# Patient Record
Sex: MALE | Race: BLACK OR AFRICAN AMERICAN | NOT HISPANIC OR LATINO | Employment: OTHER | URBAN - METROPOLITAN AREA
[De-identification: names, ages, dates, MRNs, and addresses within clinical notes are randomized per-mention and may not be internally consistent; named-entity substitution may affect disease eponyms.]

---

## 2017-01-01 ENCOUNTER — OFFICE VISIT (OUTPATIENT)
Dept: PAIN MEDICINE | Facility: CLINIC | Age: 62
End: 2017-01-01

## 2017-01-01 ENCOUNTER — APPOINTMENT (OUTPATIENT)
Dept: CARDIOLOGY | Facility: HOSPITAL | Age: 62
End: 2017-01-01
Attending: INTERNAL MEDICINE

## 2017-01-01 ENCOUNTER — APPOINTMENT (OUTPATIENT)
Dept: GENERAL RADIOLOGY | Facility: HOSPITAL | Age: 62
End: 2017-01-01

## 2017-01-01 ENCOUNTER — ANESTHESIA EVENT (OUTPATIENT)
Dept: GASTROENTEROLOGY | Facility: HOSPITAL | Age: 62
End: 2017-01-01

## 2017-01-01 ENCOUNTER — ANESTHESIA (OUTPATIENT)
Dept: GASTROENTEROLOGY | Facility: HOSPITAL | Age: 62
End: 2017-01-01

## 2017-01-01 ENCOUNTER — HOSPITAL ENCOUNTER (EMERGENCY)
Facility: HOSPITAL | Age: 62
Discharge: HOME | End: 2017-08-25
Attending: EMERGENCY MEDICINE | Admitting: EMERGENCY MEDICINE

## 2017-01-01 ENCOUNTER — HOSPITAL ENCOUNTER (OUTPATIENT)
Dept: NEUROLOGY | Facility: HOSPITAL | Age: 62
Discharge: HOME | End: 2017-08-14
Attending: FAMILY MEDICINE | Admitting: FAMILY MEDICINE

## 2017-01-01 ENCOUNTER — APPOINTMENT (OUTPATIENT)
Dept: NEUROLOGY | Facility: HOSPITAL | Age: 62
End: 2017-01-01
Attending: FAMILY MEDICINE

## 2017-01-01 ENCOUNTER — HOSPITAL ENCOUNTER (INPATIENT)
Facility: HOSPITAL | Age: 62
LOS: 6 days | Discharge: HOME | End: 2017-09-18
Attending: EMERGENCY MEDICINE | Admitting: INTERNAL MEDICINE

## 2017-01-01 ENCOUNTER — OUTSIDE FACILITY SERVICE (OUTPATIENT)
Dept: HOSPITALIST | Facility: HOSPITAL | Age: 62
End: 2017-01-01

## 2017-01-01 ENCOUNTER — HOSPITAL ENCOUNTER (OUTPATIENT)
Facility: HOSPITAL | Age: 62
Setting detail: OBSERVATION
Discharge: HOME | End: 2017-09-29
Attending: EMERGENCY MEDICINE | Admitting: HOSPITALIST

## 2017-01-01 ENCOUNTER — OFFICE VISIT (OUTPATIENT)
Dept: GASTROENTEROLOGY | Facility: CLINIC | Age: 62
End: 2017-01-01

## 2017-01-01 ENCOUNTER — APPOINTMENT (OUTPATIENT)
Dept: CARDIOLOGY | Facility: HOSPITAL | Age: 62
End: 2017-01-01

## 2017-01-01 VITALS
WEIGHT: 315 LBS | SYSTOLIC BLOOD PRESSURE: 113 MMHG | TEMPERATURE: 99 F | RESPIRATION RATE: 24 BRPM | OXYGEN SATURATION: 97 % | HEIGHT: 72 IN | HEART RATE: 90 BPM | DIASTOLIC BLOOD PRESSURE: 70 MMHG | BODY MASS INDEX: 42.66 KG/M2

## 2017-01-01 VITALS
OXYGEN SATURATION: 99 % | RESPIRATION RATE: 19 BRPM | HEIGHT: 72 IN | HEART RATE: 124 BPM | SYSTOLIC BLOOD PRESSURE: 140 MMHG | BODY MASS INDEX: 42.66 KG/M2 | TEMPERATURE: 96.5 F | DIASTOLIC BLOOD PRESSURE: 74 MMHG | WEIGHT: 315 LBS

## 2017-01-01 VITALS
HEIGHT: 72 IN | HEART RATE: 87 BPM | BODY MASS INDEX: 42.66 KG/M2 | RESPIRATION RATE: 20 BRPM | WEIGHT: 315 LBS | DIASTOLIC BLOOD PRESSURE: 77 MMHG | SYSTOLIC BLOOD PRESSURE: 125 MMHG | OXYGEN SATURATION: 95 % | TEMPERATURE: 98.1 F

## 2017-01-01 VITALS
OXYGEN SATURATION: 96 % | TEMPERATURE: 97.2 F | HEIGHT: 72 IN | SYSTOLIC BLOOD PRESSURE: 112 MMHG | WEIGHT: 315 LBS | DIASTOLIC BLOOD PRESSURE: 68 MMHG | BODY MASS INDEX: 42.66 KG/M2 | HEART RATE: 93 BPM

## 2017-01-01 VITALS
RESPIRATION RATE: 20 BRPM | OXYGEN SATURATION: 97 % | SYSTOLIC BLOOD PRESSURE: 107 MMHG | TEMPERATURE: 97.7 F | HEIGHT: 72 IN | WEIGHT: 315 LBS | DIASTOLIC BLOOD PRESSURE: 74 MMHG | BODY MASS INDEX: 42.66 KG/M2 | HEART RATE: 98 BPM

## 2017-01-01 DIAGNOSIS — I95.89 OTHER SPECIFIED HYPOTENSION: ICD-10-CM

## 2017-01-01 DIAGNOSIS — I95.9 HYPOTENSION, UNSPECIFIED HYPOTENSION TYPE: ICD-10-CM

## 2017-01-01 DIAGNOSIS — K92.0 HEMATEMESIS WITHOUT NAUSEA: ICD-10-CM

## 2017-01-01 DIAGNOSIS — E66.01 MORBID OBESITY DUE TO EXCESS CALORIES (HCC): ICD-10-CM

## 2017-01-01 DIAGNOSIS — R53.81 PHYSICAL DECONDITIONING: ICD-10-CM

## 2017-01-01 DIAGNOSIS — A04.72 C. DIFFICILE COLITIS: Primary | ICD-10-CM

## 2017-01-01 DIAGNOSIS — K31.811 GASTROINTESTINAL HEMORRHAGE ASSOCIATED WITH ANGIODYSPLASIA OF STOMACH AND DUODENUM: Primary | ICD-10-CM

## 2017-01-01 DIAGNOSIS — G47.33 OSA (OBSTRUCTIVE SLEEP APNEA): Chronic | ICD-10-CM

## 2017-01-01 DIAGNOSIS — D62 ACUTE BLOOD LOSS ANEMIA: ICD-10-CM

## 2017-01-01 DIAGNOSIS — Z74.09 IMPAIRED FUNCTIONAL MOBILITY, BALANCE, GAIT, AND ENDURANCE: ICD-10-CM

## 2017-01-01 DIAGNOSIS — J44.9 CHRONIC OBSTRUCTIVE PULMONARY DISEASE, UNSPECIFIED COPD TYPE (HCC): Chronic | ICD-10-CM

## 2017-01-01 DIAGNOSIS — M47.816 LUMBAR FACET ARTHROPATHY: ICD-10-CM

## 2017-01-01 DIAGNOSIS — R55 SYNCOPE, UNSPECIFIED SYNCOPE TYPE: ICD-10-CM

## 2017-01-01 DIAGNOSIS — D72.829 LEUKOCYTOSIS, UNSPECIFIED TYPE: ICD-10-CM

## 2017-01-01 DIAGNOSIS — G56.02 CARPAL TUNNEL SYNDROME, LEFT: ICD-10-CM

## 2017-01-01 DIAGNOSIS — I48.20 CHRONIC ATRIAL FIBRILLATION (HCC): Chronic | ICD-10-CM

## 2017-01-01 DIAGNOSIS — I95.9 SYMPTOMATIC HYPOTENSION: Primary | ICD-10-CM

## 2017-01-01 DIAGNOSIS — N18.9 CKD (CHRONIC KIDNEY DISEASE), UNSPECIFIED STAGE: ICD-10-CM

## 2017-01-01 DIAGNOSIS — N18.6 ESRD (END STAGE RENAL DISEASE) (HCC): Chronic | ICD-10-CM

## 2017-01-01 DIAGNOSIS — K92.2 ACUTE UPPER GI BLEED: Primary | ICD-10-CM

## 2017-01-01 DIAGNOSIS — M17.0 PRIMARY OSTEOARTHRITIS OF BOTH KNEES: ICD-10-CM

## 2017-01-01 DIAGNOSIS — K92.0 HEMATEMESIS WITH NAUSEA: ICD-10-CM

## 2017-01-01 DIAGNOSIS — M48.062 LUMBAR STENOSIS WITH NEUROGENIC CLAUDICATION: ICD-10-CM

## 2017-01-01 DIAGNOSIS — N18.6 ESRD (END STAGE RENAL DISEASE) (HCC): ICD-10-CM

## 2017-01-01 LAB
ABO + RH BLD: NORMAL
ABO GROUP BLD: NORMAL
ALBUMIN SERPL-MCNC: 2.6 G/DL (ref 3.2–4.8)
ALBUMIN SERPL-MCNC: 2.7 G/DL (ref 3.2–4.8)
ALBUMIN SERPL-MCNC: 2.7 G/DL (ref 3.2–4.8)
ALBUMIN SERPL-MCNC: 2.8 G/DL (ref 3.2–4.8)
ALBUMIN SERPL-MCNC: 2.9 G/DL (ref 3.2–4.8)
ALBUMIN SERPL-MCNC: 2.9 G/DL (ref 3.2–4.8)
ALBUMIN SERPL-MCNC: 3.2 G/DL (ref 3.2–4.8)
ALBUMIN SERPL-MCNC: 3.5 G/DL (ref 3.2–4.8)
ALBUMIN SERPL-MCNC: 3.9 G/DL (ref 3.2–4.8)
ALBUMIN/GLOB SERPL: 0.8 G/DL (ref 1.5–2.5)
ALBUMIN/GLOB SERPL: 0.9 G/DL (ref 1.5–2.5)
ALBUMIN/GLOB SERPL: 1 G/DL (ref 1.5–2.5)
ALBUMIN/GLOB SERPL: 1 G/DL (ref 1.5–2.5)
ALBUMIN/GLOB SERPL: 1.1 G/DL (ref 1.5–2.5)
ALP SERPL-CCNC: 101 U/L (ref 25–100)
ALP SERPL-CCNC: 117 U/L (ref 25–100)
ALP SERPL-CCNC: 67 U/L (ref 25–100)
ALP SERPL-CCNC: 83 U/L (ref 25–100)
ALP SERPL-CCNC: 91 U/L (ref 25–100)
ALT SERPL W P-5'-P-CCNC: 10 U/L (ref 7–40)
ALT SERPL W P-5'-P-CCNC: 10 U/L (ref 7–40)
ALT SERPL W P-5'-P-CCNC: 12 U/L (ref 7–40)
ALT SERPL W P-5'-P-CCNC: 17 U/L (ref 7–40)
ALT SERPL W P-5'-P-CCNC: 18 U/L (ref 7–40)
ANION GAP SERPL CALCULATED.3IONS-SCNC: 11 MMOL/L (ref 3–11)
ANION GAP SERPL CALCULATED.3IONS-SCNC: 12 MMOL/L (ref 3–11)
ANION GAP SERPL CALCULATED.3IONS-SCNC: 12 MMOL/L (ref 3–11)
ANION GAP SERPL CALCULATED.3IONS-SCNC: 13 MMOL/L (ref 3–11)
ANION GAP SERPL CALCULATED.3IONS-SCNC: 14 MMOL/L (ref 3–11)
ANION GAP SERPL CALCULATED.3IONS-SCNC: 15 MMOL/L (ref 3–11)
ANION GAP SERPL CALCULATED.3IONS-SCNC: 15 MMOL/L (ref 3–11)
ANION GAP SERPL CALCULATED.3IONS-SCNC: 16 MMOL/L (ref 3–11)
ANION GAP SERPL CALCULATED.3IONS-SCNC: 18 MMOL/L (ref 3–11)
APTT PPP: 28.2 SECONDS (ref 24–31)
AST SERPL-CCNC: 13 U/L (ref 0–33)
AST SERPL-CCNC: 13 U/L (ref 0–33)
AST SERPL-CCNC: 16 U/L (ref 0–33)
AST SERPL-CCNC: 17 U/L (ref 0–33)
AST SERPL-CCNC: 18 U/L (ref 0–33)
BACTERIA SPEC AEROBE CULT: NORMAL
BACTERIA SPEC RESP CULT: ABNORMAL
BACTERIA SPEC RESP CULT: ABNORMAL
BASOPHILS # BLD AUTO: 0.03 10*3/MM3 (ref 0–0.2)
BASOPHILS # BLD AUTO: 0.04 10*3/MM3 (ref 0–0.2)
BASOPHILS # BLD AUTO: 0.04 10*3/MM3 (ref 0–0.2)
BASOPHILS # BLD AUTO: 0.06 10*3/MM3 (ref 0–0.2)
BASOPHILS # BLD MANUAL: 0.1 10*3/MM3 (ref 0–0.2)
BASOPHILS NFR BLD AUTO: 0.2 % (ref 0–1)
BASOPHILS NFR BLD AUTO: 0.3 % (ref 0–1)
BASOPHILS NFR BLD AUTO: 0.4 % (ref 0–1)
BASOPHILS NFR BLD AUTO: 0.7 % (ref 0–1)
BASOPHILS NFR BLD AUTO: 1 % (ref 0–1)
BH BB BLOOD EXPIRATION DATE: NORMAL
BH BB BLOOD TYPE BARCODE: 6200
BH BB DISPENSE STATUS: NORMAL
BH BB PRODUCT CODE: NORMAL
BH BB UNIT NUMBER: NORMAL
BH CV ECHO MEAS - AO MAX PG (FULL): 3.5 MMHG
BH CV ECHO MEAS - AO MAX PG: 7.9 MMHG
BH CV ECHO MEAS - AO ROOT AREA (BSA CORRECTED): 1.3
BH CV ECHO MEAS - AO ROOT AREA: 9.9 CM^2
BH CV ECHO MEAS - AO ROOT DIAM: 3.5 CM
BH CV ECHO MEAS - AO V2 MAX: 140.6 CM/SEC
BH CV ECHO MEAS - AVA(V,A): 2.7 CM^2
BH CV ECHO MEAS - AVA(V,D): 2.7 CM^2
BH CV ECHO MEAS - BSA(HAYCOCK): 2.9 M^2
BH CV ECHO MEAS - BSA: 2.7 M^2
BH CV ECHO MEAS - BZI_BMI: 45.4 KILOGRAMS/M^2
BH CV ECHO MEAS - BZI_METRIC_HEIGHT: 182.9 CM
BH CV ECHO MEAS - BZI_METRIC_WEIGHT: 152 KG
BH CV ECHO MEAS - CONTRAST EF (2CH): 38.5 ML/M^2
BH CV ECHO MEAS - CONTRAST EF 4CH: 50.5 ML/M^2
BH CV ECHO MEAS - EDV(CUBED): 128.7 ML
BH CV ECHO MEAS - EDV(MOD-SP2): 91 ML
BH CV ECHO MEAS - EDV(MOD-SP4): 109 ML
BH CV ECHO MEAS - EDV(TEICH): 121 ML
BH CV ECHO MEAS - EF(CUBED): 50.5 %
BH CV ECHO MEAS - EF(MOD-SP2): 38.5 %
BH CV ECHO MEAS - EF(MOD-SP4): 50.5 %
BH CV ECHO MEAS - EF(TEICH): 42.4 %
BH CV ECHO MEAS - ESV(CUBED): 63.7 ML
BH CV ECHO MEAS - ESV(MOD-SP2): 56 ML
BH CV ECHO MEAS - ESV(MOD-SP4): 54 ML
BH CV ECHO MEAS - ESV(TEICH): 69.7 ML
BH CV ECHO MEAS - FS: 20.9 %
BH CV ECHO MEAS - IVS/LVPW: 0.89
BH CV ECHO MEAS - IVSD: 1.2 CM
BH CV ECHO MEAS - LA DIMENSION: 5 CM
BH CV ECHO MEAS - LA/AO: 1.4
BH CV ECHO MEAS - LAT PEAK E' VEL: 14.5 CM/SEC
BH CV ECHO MEAS - LV DIASTOLIC VOL/BSA (35-75): 41.1 ML/M^2
BH CV ECHO MEAS - LV MASS(C)D: 253.4 GRAMS
BH CV ECHO MEAS - LV MASS(C)DI: 95.5 GRAMS/M^2
BH CV ECHO MEAS - LV MAX PG: 4.4 MMHG
BH CV ECHO MEAS - LV MEAN PG: 1.7 MMHG
BH CV ECHO MEAS - LV SYSTOLIC VOL/BSA (12-30): 20.4 ML/M^2
BH CV ECHO MEAS - LV V1 MAX: 104.6 CM/SEC
BH CV ECHO MEAS - LV V1 MEAN: 57 CM/SEC
BH CV ECHO MEAS - LV V1 VTI: 16.5 CM
BH CV ECHO MEAS - LVIDD: 5 CM
BH CV ECHO MEAS - LVIDS: 4 CM
BH CV ECHO MEAS - LVLD AP2: 7.8 CM
BH CV ECHO MEAS - LVLD AP4: 7.9 CM
BH CV ECHO MEAS - LVLS AP2: 6.7 CM
BH CV ECHO MEAS - LVLS AP4: 7.9 CM
BH CV ECHO MEAS - LVOT AREA (M): 3.5 CM^2
BH CV ECHO MEAS - LVOT AREA: 3.6 CM^2
BH CV ECHO MEAS - LVOT DIAM: 2.1 CM
BH CV ECHO MEAS - LVPWD: 1.3 CM
BH CV ECHO MEAS - MED PEAK E' VEL: 11.2 CM/SEC
BH CV ECHO MEAS - MR ALIAS VEL: 31.9 CM/SEC
BH CV ECHO MEAS - MR ERO: 0.28 CM^2
BH CV ECHO MEAS - MR FLOW RATE: 128.4 CM^3/SEC
BH CV ECHO MEAS - MR MAX PG: 83.4 MMHG
BH CV ECHO MEAS - MR MAX VEL: 456.6 CM/SEC
BH CV ECHO MEAS - MR MEAN PG: 53.9 MMHG
BH CV ECHO MEAS - MR MEAN VEL: 338.6 CM/SEC
BH CV ECHO MEAS - MR PISA RADIUS: 0.8 CM
BH CV ECHO MEAS - MR PISA: 4 CM^2
BH CV ECHO MEAS - MR VOLUME: 38.2 ML
BH CV ECHO MEAS - MR VTI: 135.9 CM
BH CV ECHO MEAS - MV AREA (1 DIAM): 10.1 CM^2
BH CV ECHO MEAS - MV DIAM: 3.6 CM
BH CV ECHO MEAS - MV E MAX VEL: 103.2 CM/SEC
BH CV ECHO MEAS - MV FLOW AREA(1DIAM): 10.1 CM^2
BH CV ECHO MEAS - MV MAX PG: 8.3 MMHG
BH CV ECHO MEAS - MV MEAN PG: 3.6 MMHG
BH CV ECHO MEAS - MV V2 MAX: 144.5 CM/SEC
BH CV ECHO MEAS - MV V2 MEAN: 90 CM/SEC
BH CV ECHO MEAS - MV V2 VTI: 27.1 CM
BH CV ECHO MEAS - MVA(VTI): 2.2 CM^2
BH CV ECHO MEAS - PA ACC SLOPE: 884.3 CM/SEC^2
BH CV ECHO MEAS - PA ACC TIME: 0.09 SEC
BH CV ECHO MEAS - PA MAX PG: 5.9 MMHG
BH CV ECHO MEAS - PA PR(ACCEL): 40.2 MMHG
BH CV ECHO MEAS - PA V2 MAX: 121.5 CM/SEC
BH CV ECHO MEAS - PI END-D VEL: 173.1 CM/SEC
BH CV ECHO MEAS - RAP SYSTOLE: 3 MMHG
BH CV ECHO MEAS - RF(MV,LVOT)(1DIAM): 0.78
BH CV ECHO MEAS - RVDD: 3.1 CM
BH CV ECHO MEAS - RVSP: 23 MMHG
BH CV ECHO MEAS - SI(CUBED): 24.5 ML/M^2
BH CV ECHO MEAS - SI(LVOT): 22.3 ML/M^2
BH CV ECHO MEAS - SI(MOD-SP2): 13.2 ML/M^2
BH CV ECHO MEAS - SI(MOD-SP4): 20.7 ML/M^2
BH CV ECHO MEAS - SI(MV 1 DIAM): 103.5 ML/M^2
BH CV ECHO MEAS - SI(TEICH): 19.3 ML/M^2
BH CV ECHO MEAS - SV(CUBED): 65.1 ML
BH CV ECHO MEAS - SV(LVOT): 59.3 ML
BH CV ECHO MEAS - SV(MOD-SP2): 35 ML
BH CV ECHO MEAS - SV(MOD-SP4): 55 ML
BH CV ECHO MEAS - SV(MV 1 DIAM): 274.6 ML
BH CV ECHO MEAS - SV(TEICH): 51.2 ML
BH CV ECHO MEAS - TAPSE (>1.6): 1.4 CM2
BH CV ECHO MEAS - TR MAX V: 20 MMHG
BH CV ECHO MEAS - TR MAX VEL: 225 CM/SEC
BH CV LOWER VASCULAR LEFT COMMON FEMORAL AUGMENT: NORMAL
BH CV LOWER VASCULAR LEFT COMMON FEMORAL COMPRESS: NORMAL
BH CV LOWER VASCULAR LEFT COMMON FEMORAL PHASIC: NORMAL
BH CV LOWER VASCULAR LEFT COMMON FEMORAL SPONT: NORMAL
BH CV LOWER VASCULAR LEFT DISTAL FEMORAL COMPRESS: NORMAL
BH CV LOWER VASCULAR LEFT GASTRONEMIUS COMPRESS: NORMAL
BH CV LOWER VASCULAR LEFT LESSER SAPH COMPRESS: NORMAL
BH CV LOWER VASCULAR LEFT MID FEMORAL AUGMENT: NORMAL
BH CV LOWER VASCULAR LEFT MID FEMORAL COMPRESS: NORMAL
BH CV LOWER VASCULAR LEFT MID FEMORAL PHASIC: NORMAL
BH CV LOWER VASCULAR LEFT MID FEMORAL SPONT: NORMAL
BH CV LOWER VASCULAR LEFT PERONEAL COMPRESS: NORMAL
BH CV LOWER VASCULAR LEFT POPLITEAL AUGMENT: NORMAL
BH CV LOWER VASCULAR LEFT POPLITEAL COMPRESS: NORMAL
BH CV LOWER VASCULAR LEFT POPLITEAL PHASIC: NORMAL
BH CV LOWER VASCULAR LEFT POPLITEAL SPONT: NORMAL
BH CV LOWER VASCULAR LEFT POSTERIOR TIBIAL COMPRESS: NORMAL
BH CV LOWER VASCULAR LEFT PROXIMAL FEMORAL COMPRESS: NORMAL
BH CV LOWER VASCULAR LEFT SAPHENOFEMORAL JUNCTION AUGMENT: NORMAL
BH CV LOWER VASCULAR LEFT SAPHENOFEMORAL JUNCTION COMPRESS: NORMAL
BH CV LOWER VASCULAR LEFT SAPHENOFEMORAL JUNCTION PHASIC: NORMAL
BH CV LOWER VASCULAR LEFT SAPHENOFEMORAL JUNCTION SPONT: NORMAL
BH CV LOWER VASCULAR RIGHT COMMON FEMORAL AUGMENT: NORMAL
BH CV LOWER VASCULAR RIGHT COMMON FEMORAL COMPRESS: NORMAL
BH CV LOWER VASCULAR RIGHT COMMON FEMORAL PHASIC: NORMAL
BH CV LOWER VASCULAR RIGHT COMMON FEMORAL SPONT: NORMAL
BH CV LOWER VASCULAR RIGHT DISTAL FEMORAL COMPRESS: NORMAL
BH CV LOWER VASCULAR RIGHT GASTRONEMIUS COMPRESS: NORMAL
BH CV LOWER VASCULAR RIGHT GREATER SAPH AK COMPRESS: NORMAL
BH CV LOWER VASCULAR RIGHT GREATER SAPH BK COMPRESS: NORMAL
BH CV LOWER VASCULAR RIGHT LESSER SAPH COMPRESS: NORMAL
BH CV LOWER VASCULAR RIGHT MID FEMORAL AUGMENT: NORMAL
BH CV LOWER VASCULAR RIGHT MID FEMORAL COMPRESS: NORMAL
BH CV LOWER VASCULAR RIGHT MID FEMORAL PHASIC: NORMAL
BH CV LOWER VASCULAR RIGHT MID FEMORAL SPONT: NORMAL
BH CV LOWER VASCULAR RIGHT PERONEAL COMPRESS: NORMAL
BH CV LOWER VASCULAR RIGHT POPLITEAL AUGMENT: NORMAL
BH CV LOWER VASCULAR RIGHT POPLITEAL COMPRESS: NORMAL
BH CV LOWER VASCULAR RIGHT POPLITEAL PHASIC: NORMAL
BH CV LOWER VASCULAR RIGHT POPLITEAL SPONT: NORMAL
BH CV LOWER VASCULAR RIGHT POSTERIOR TIBIAL COMPRESS: NORMAL
BH CV LOWER VASCULAR RIGHT PROXIMAL FEMORAL COMPRESS: NORMAL
BH CV LOWER VASCULAR RIGHT SAPHENOFEMORAL JUNCTION AUGMENT: NORMAL
BH CV LOWER VASCULAR RIGHT SAPHENOFEMORAL JUNCTION COMPRESS: NORMAL
BH CV LOWER VASCULAR RIGHT SAPHENOFEMORAL JUNCTION PHASIC: NORMAL
BH CV LOWER VASCULAR RIGHT SAPHENOFEMORAL JUNCTION SPONT: NORMAL
BH CV XLRA - RV BASE: 4 CM
BH CV XLRA - RV LENGTH: 9.5 CM
BH CV XLRA - RV MID: 3.6 CM
BH CV XLRA - TDI S': 15.6 CM/SEC
BILIRUB SERPL-MCNC: 0.1 MG/DL (ref 0.3–1.2)
BILIRUB SERPL-MCNC: 0.2 MG/DL (ref 0.3–1.2)
BILIRUB SERPL-MCNC: 0.3 MG/DL (ref 0.3–1.2)
BILIRUB SERPL-MCNC: 0.4 MG/DL (ref 0.3–1.2)
BILIRUB SERPL-MCNC: 0.4 MG/DL (ref 0.3–1.2)
BLD GP AB SCN SERPL QL: NEGATIVE
BLD GP AB SCN SERPL QL: NEGATIVE
BNP SERPL-MCNC: 161 PG/ML (ref 0–100)
BNP SERPL-MCNC: 309 PG/ML (ref 0–100)
BNP SERPL-MCNC: 71 PG/ML (ref 0–100)
BUN BLD-MCNC: 102 MG/DL (ref 9–23)
BUN BLD-MCNC: 103 MG/DL (ref 9–23)
BUN BLD-MCNC: 106 MG/DL (ref 9–23)
BUN BLD-MCNC: 47 MG/DL (ref 9–23)
BUN BLD-MCNC: 52 MG/DL (ref 9–23)
BUN BLD-MCNC: 53 MG/DL (ref 9–23)
BUN BLD-MCNC: 54 MG/DL (ref 9–23)
BUN BLD-MCNC: 60 MG/DL (ref 9–23)
BUN BLD-MCNC: 63 MG/DL (ref 9–23)
BUN BLD-MCNC: 70 MG/DL (ref 9–23)
BUN BLD-MCNC: 82 MG/DL (ref 9–23)
BUN BLD-MCNC: 88 MG/DL (ref 9–23)
BUN BLD-MCNC: 89 MG/DL (ref 9–23)
BUN BLDA-MCNC: 68 MG/DL (ref 8–26)
BUN/CREAT SERPL: 4.2 (ref 7–25)
BUN/CREAT SERPL: 4.7 (ref 7–25)
BUN/CREAT SERPL: 4.9 (ref 7–25)
BUN/CREAT SERPL: 5.2 (ref 7–25)
BUN/CREAT SERPL: 6.3 (ref 7–25)
BUN/CREAT SERPL: 7.5 (ref 7–25)
BUN/CREAT SERPL: 7.6 (ref 7–25)
BUN/CREAT SERPL: 8.6 (ref 7–25)
BUN/CREAT SERPL: 9.2 (ref 7–25)
BUN/CREAT SERPL: 9.2 (ref 7–25)
BUN/CREAT SERPL: 9.6 (ref 7–25)
C DIFF TOX GENS STL QL NAA+PROBE: DETECTED
C DIFF TOX GENS STL QL NAA+PROBE: DETECTED
CA-I BLDA-SCNC: 1.06 MMOL/L (ref 1.2–1.32)
CA-I SERPL ISE-MCNC: 1.05 MMOL/L (ref 1.12–1.32)
CA-I SERPL ISE-MCNC: 1.05 MMOL/L (ref 1.12–1.32)
CALCIUM SPEC-SCNC: 7.9 MG/DL (ref 8.7–10.4)
CALCIUM SPEC-SCNC: 8.1 MG/DL (ref 8.7–10.4)
CALCIUM SPEC-SCNC: 8.4 MG/DL (ref 8.7–10.4)
CALCIUM SPEC-SCNC: 8.6 MG/DL (ref 8.7–10.4)
CALCIUM SPEC-SCNC: 8.7 MG/DL (ref 8.7–10.4)
CALCIUM SPEC-SCNC: 8.8 MG/DL (ref 8.7–10.4)
CALCIUM SPEC-SCNC: 8.9 MG/DL (ref 8.7–10.4)
CALCIUM SPEC-SCNC: 9 MG/DL (ref 8.7–10.4)
CALCIUM SPEC-SCNC: 9 MG/DL (ref 8.7–10.4)
CALCIUM SPEC-SCNC: 9.1 MG/DL (ref 8.7–10.4)
CALCIUM SPEC-SCNC: 9.2 MG/DL (ref 8.7–10.4)
CALCIUM SPEC-SCNC: 9.5 MG/DL (ref 8.7–10.4)
CALCIUM SPEC-SCNC: 9.6 MG/DL (ref 8.7–10.4)
CHLORIDE BLDA-SCNC: 93 MMOL/L (ref 98–109)
CHLORIDE SERPL-SCNC: 88 MMOL/L (ref 99–109)
CHLORIDE SERPL-SCNC: 88 MMOL/L (ref 99–109)
CHLORIDE SERPL-SCNC: 90 MMOL/L (ref 99–109)
CHLORIDE SERPL-SCNC: 91 MMOL/L (ref 99–109)
CHLORIDE SERPL-SCNC: 92 MMOL/L (ref 99–109)
CHLORIDE SERPL-SCNC: 92 MMOL/L (ref 99–109)
CHLORIDE SERPL-SCNC: 93 MMOL/L (ref 99–109)
CHLORIDE SERPL-SCNC: 94 MMOL/L (ref 99–109)
CO2 BLDA-SCNC: 28 MMOL/L (ref 24–29)
CO2 SERPL-SCNC: 20 MMOL/L (ref 20–31)
CO2 SERPL-SCNC: 21 MMOL/L (ref 20–31)
CO2 SERPL-SCNC: 21 MMOL/L (ref 20–31)
CO2 SERPL-SCNC: 22 MMOL/L (ref 20–31)
CO2 SERPL-SCNC: 22 MMOL/L (ref 20–31)
CO2 SERPL-SCNC: 23 MMOL/L (ref 20–31)
CO2 SERPL-SCNC: 24 MMOL/L (ref 20–31)
CO2 SERPL-SCNC: 25 MMOL/L (ref 20–31)
CO2 SERPL-SCNC: 26 MMOL/L (ref 20–31)
CO2 SERPL-SCNC: 26 MMOL/L (ref 20–31)
CORTIS AM PEAK SERPL-MCNC: 30.2 MCG/DL (ref 4.3–22.4)
CREAT BLD-MCNC: 10.3 MG/DL (ref 0.6–1.3)
CREAT BLD-MCNC: 10.6 MG/DL (ref 0.6–1.3)
CREAT BLD-MCNC: 10.8 MG/DL (ref 0.6–1.3)
CREAT BLD-MCNC: 11 MG/DL (ref 0.6–1.3)
CREAT BLD-MCNC: 11.2 MG/DL (ref 0.6–1.3)
CREAT BLD-MCNC: 11.3 MG/DL (ref 0.6–1.3)
CREAT BLD-MCNC: 11.5 MG/DL (ref 0.6–1.3)
CREAT BLD-MCNC: 11.5 MG/DL (ref 0.6–1.3)
CREAT BLD-MCNC: 11.8 MG/DL (ref 0.6–1.3)
CREAT BLD-MCNC: 12.2 MG/DL (ref 0.6–1.3)
CREAT BLD-MCNC: 12.2 MG/DL (ref 0.6–1.3)
CREAT BLDA-MCNC: 12.2 MG/DL (ref 0.6–1.3)
CROSSMATCH INTERPRETATION: NORMAL
CROSSMATCH INTERPRETATION: NORMAL
D-LACTATE SERPL-SCNC: 1.7 MMOL/L (ref 0.5–2)
D-LACTATE SERPL-SCNC: 1.9 MMOL/L (ref 0.5–2)
D-LACTATE SERPL-SCNC: 2.4 MMOL/L (ref 0.5–2)
D-LACTATE SERPL-SCNC: 3.1 MMOL/L (ref 0.5–2)
D-LACTATE SERPL-SCNC: 3.7 MMOL/L (ref 0.5–2)
DEPRECATED RDW RBC AUTO: 56.7 FL (ref 37–54)
DEPRECATED RDW RBC AUTO: 59.8 FL (ref 37–54)
DEPRECATED RDW RBC AUTO: 59.8 FL (ref 37–54)
DEPRECATED RDW RBC AUTO: 60.3 FL (ref 37–54)
DEPRECATED RDW RBC AUTO: 60.3 FL (ref 37–54)
DEPRECATED RDW RBC AUTO: 62.1 FL (ref 37–54)
DEPRECATED RDW RBC AUTO: 62.2 FL (ref 37–54)
DEPRECATED RDW RBC AUTO: 62.3 FL (ref 37–54)
DEPRECATED RDW RBC AUTO: 63 FL (ref 37–54)
DEPRECATED RDW RBC AUTO: 63.1 FL (ref 37–54)
DEPRECATED RDW RBC AUTO: 69.9 FL (ref 37–54)
DEVELOPER EXPIRATION DATE: ABNORMAL
DEVELOPER LOT NUMBER: ABNORMAL
DIGOXIN SERPL-MCNC: 0.11 NG/ML (ref 0.8–2)
E/E' RATIO: 8
EOSINOPHIL # BLD AUTO: 0.05 10*3/MM3 (ref 0–0.3)
EOSINOPHIL # BLD AUTO: 0.21 10*3/MM3 (ref 0–0.3)
EOSINOPHIL # BLD AUTO: 0.25 10*3/MM3 (ref 0–0.3)
EOSINOPHIL # BLD AUTO: 0.25 10*3/MM3 (ref 0–0.3)
EOSINOPHIL # BLD AUTO: 0.28 10*3/MM3 (ref 0–0.3)
EOSINOPHIL # BLD AUTO: 0.28 10*3/MM3 (ref 0–0.3)
EOSINOPHIL # BLD AUTO: 0.35 10*3/MM3 (ref 0–0.3)
EOSINOPHIL # BLD AUTO: 0.37 10*3/MM3 (ref 0–0.3)
EOSINOPHIL # BLD AUTO: 0.37 10*3/MM3 (ref 0–0.3)
EOSINOPHIL # BLD MANUAL: 0.49 10*3/MM3 (ref 0.1–0.3)
EOSINOPHIL NFR BLD AUTO: 0.6 % (ref 0–3)
EOSINOPHIL NFR BLD AUTO: 1.7 % (ref 0–3)
EOSINOPHIL NFR BLD AUTO: 2.2 % (ref 0–3)
EOSINOPHIL NFR BLD AUTO: 2.5 % (ref 0–3)
EOSINOPHIL NFR BLD AUTO: 2.7 % (ref 0–3)
EOSINOPHIL NFR BLD AUTO: 3 % (ref 0–3)
EOSINOPHIL NFR BLD AUTO: 3.1 % (ref 0–3)
EOSINOPHIL NFR BLD AUTO: 3.4 % (ref 0–3)
EOSINOPHIL NFR BLD AUTO: 4.2 % (ref 0–3)
EOSINOPHIL NFR BLD MANUAL: 5 % (ref 0–3)
ERYTHROCYTE [DISTWIDTH] IN BLOOD BY AUTOMATED COUNT: 16.4 % (ref 11.3–14.5)
ERYTHROCYTE [DISTWIDTH] IN BLOOD BY AUTOMATED COUNT: 16.7 % (ref 11.3–14.5)
ERYTHROCYTE [DISTWIDTH] IN BLOOD BY AUTOMATED COUNT: 17.1 % (ref 11.3–14.5)
ERYTHROCYTE [DISTWIDTH] IN BLOOD BY AUTOMATED COUNT: 17.6 % (ref 11.3–14.5)
ERYTHROCYTE [DISTWIDTH] IN BLOOD BY AUTOMATED COUNT: 17.9 % (ref 11.3–14.5)
ERYTHROCYTE [DISTWIDTH] IN BLOOD BY AUTOMATED COUNT: 18.2 % (ref 11.3–14.5)
ERYTHROCYTE [DISTWIDTH] IN BLOOD BY AUTOMATED COUNT: 18.2 % (ref 11.3–14.5)
ERYTHROCYTE [DISTWIDTH] IN BLOOD BY AUTOMATED COUNT: 18.9 % (ref 11.3–14.5)
ERYTHROCYTE [DISTWIDTH] IN BLOOD BY AUTOMATED COUNT: 20.6 % (ref 11.3–14.5)
EXPIRATION DATE: ABNORMAL
FECAL OCCULT BLOOD SCREEN, POC: POSITIVE
GFR SERPL CREATININE-BSD FRML MDRD: 5 ML/MIN/1.73
GFR SERPL CREATININE-BSD FRML MDRD: 6 ML/MIN/1.73
GLOBULIN UR ELPH-MCNC: 2.7 GM/DL
GLOBULIN UR ELPH-MCNC: 3.1 GM/DL
GLOBULIN UR ELPH-MCNC: 3.2 GM/DL
GLOBULIN UR ELPH-MCNC: 3.5 GM/DL
GLOBULIN UR ELPH-MCNC: 4.4 GM/DL
GLUCOSE BLD-MCNC: 101 MG/DL (ref 70–100)
GLUCOSE BLD-MCNC: 105 MG/DL (ref 70–100)
GLUCOSE BLD-MCNC: 108 MG/DL (ref 70–100)
GLUCOSE BLD-MCNC: 109 MG/DL (ref 70–100)
GLUCOSE BLD-MCNC: 116 MG/DL (ref 70–100)
GLUCOSE BLD-MCNC: 117 MG/DL (ref 70–100)
GLUCOSE BLD-MCNC: 130 MG/DL (ref 70–100)
GLUCOSE BLD-MCNC: 155 MG/DL (ref 70–100)
GLUCOSE BLD-MCNC: 86 MG/DL (ref 70–100)
GLUCOSE BLD-MCNC: 92 MG/DL (ref 70–100)
GLUCOSE BLD-MCNC: 96 MG/DL (ref 70–100)
GLUCOSE BLDC GLUCOMTR-MCNC: 105 MG/DL (ref 70–130)
GRAM STN SPEC: ABNORMAL
HAPTOGLOB SERPL-MCNC: 322 MG/DL (ref 34–200)
HCT VFR BLD AUTO: 19.1 % (ref 38.9–50.9)
HCT VFR BLD AUTO: 19.6 % (ref 38.9–50.9)
HCT VFR BLD AUTO: 21.8 % (ref 38.9–50.9)
HCT VFR BLD AUTO: 22.2 % (ref 38.9–50.9)
HCT VFR BLD AUTO: 22.9 % (ref 38.9–50.9)
HCT VFR BLD AUTO: 23.1 % (ref 38.9–50.9)
HCT VFR BLD AUTO: 23.4 % (ref 38.9–50.9)
HCT VFR BLD AUTO: 23.9 % (ref 38.9–50.9)
HCT VFR BLD AUTO: 23.9 % (ref 38.9–50.9)
HCT VFR BLD AUTO: 24.5 % (ref 38.9–50.9)
HCT VFR BLD AUTO: 26.3 % (ref 38.9–50.9)
HCT VFR BLD AUTO: 26.4 % (ref 38.9–50.9)
HCT VFR BLD AUTO: 27.2 % (ref 38.9–50.9)
HCT VFR BLD AUTO: 28 % (ref 38.9–50.9)
HCT VFR BLD AUTO: 28.1 % (ref 38.9–50.9)
HCT VFR BLD AUTO: 28.3 % (ref 38.9–50.9)
HCT VFR BLD AUTO: 29.5 % (ref 38.9–50.9)
HCT VFR BLD AUTO: 38.3 % (ref 38.9–50.9)
HCT VFR BLDA CALC: 40 % (ref 38–51)
HGB BLD-MCNC: 12.8 G/DL (ref 13.1–17.5)
HGB BLD-MCNC: 6.3 G/DL (ref 13.1–17.5)
HGB BLD-MCNC: 6.4 G/DL (ref 13.1–17.5)
HGB BLD-MCNC: 7.2 G/DL (ref 13.1–17.5)
HGB BLD-MCNC: 7.3 G/DL (ref 13.1–17.5)
HGB BLD-MCNC: 7.6 G/DL (ref 13.1–17.5)
HGB BLD-MCNC: 7.7 G/DL (ref 13.1–17.5)
HGB BLD-MCNC: 7.9 G/DL (ref 13.1–17.5)
HGB BLD-MCNC: 8 G/DL (ref 13.1–17.5)
HGB BLD-MCNC: 8.2 G/DL (ref 13.1–17.5)
HGB BLD-MCNC: 8.2 G/DL (ref 13.1–17.5)
HGB BLD-MCNC: 8.5 G/DL (ref 13.1–17.5)
HGB BLD-MCNC: 8.7 G/DL (ref 13.1–17.5)
HGB BLD-MCNC: 8.8 G/DL (ref 13.1–17.5)
HGB BLD-MCNC: 8.8 G/DL (ref 13.1–17.5)
HGB BLD-MCNC: 9 G/DL (ref 13.1–17.5)
HGB BLD-MCNC: 9.2 G/DL (ref 13.1–17.5)
HGB BLD-MCNC: 9.6 G/DL (ref 13.1–17.5)
HGB BLDA-MCNC: 13.6 G/DL (ref 12–17)
HOLD SPECIMEN: NORMAL
IMM GRANULOCYTES # BLD: 0.05 10*3/MM3 (ref 0–0.03)
IMM GRANULOCYTES # BLD: 0.06 10*3/MM3 (ref 0–0.03)
IMM GRANULOCYTES # BLD: 0.07 10*3/MM3 (ref 0–0.03)
IMM GRANULOCYTES # BLD: 0.08 10*3/MM3 (ref 0–0.03)
IMM GRANULOCYTES # BLD: 0.11 10*3/MM3 (ref 0–0.03)
IMM GRANULOCYTES # BLD: 0.12 10*3/MM3 (ref 0–0.03)
IMM GRANULOCYTES # BLD: 0.13 10*3/MM3 (ref 0–0.03)
IMM GRANULOCYTES # BLD: 0.24 10*3/MM3 (ref 0–0.03)
IMM GRANULOCYTES # BLD: 0.26 10*3/MM3 (ref 0–0.03)
IMM GRANULOCYTES NFR BLD: 0.5 % (ref 0–0.6)
IMM GRANULOCYTES NFR BLD: 0.6 % (ref 0–0.6)
IMM GRANULOCYTES NFR BLD: 0.8 % (ref 0–0.6)
IMM GRANULOCYTES NFR BLD: 1.3 % (ref 0–0.6)
IMM GRANULOCYTES NFR BLD: 1.4 % (ref 0–0.6)
IMM GRANULOCYTES NFR BLD: 2.1 % (ref 0–0.6)
IMM GRANULOCYTES NFR BLD: 2.2 % (ref 0–0.6)
INR PPP: 1.1
INR PPP: 1.19
INR PPP: ABNORMAL
LDH SERPL-CCNC: 244 U/L (ref 120–246)
LEFT ATRIUM VOLUME INDEX: 45 ML/M2
LEFT ATRIUM VOLUME: 121 CM3
LIPASE SERPL-CCNC: 66 U/L (ref 6–51)
LIPASE SERPL-CCNC: 84 U/L (ref 6–51)
LYMPHOCYTES # BLD AUTO: 0.67 10*3/MM3 (ref 0.6–4.8)
LYMPHOCYTES # BLD AUTO: 0.71 10*3/MM3 (ref 0.6–4.8)
LYMPHOCYTES # BLD AUTO: 0.81 10*3/MM3 (ref 0.6–4.8)
LYMPHOCYTES # BLD AUTO: 1.29 10*3/MM3 (ref 0.6–4.8)
LYMPHOCYTES # BLD AUTO: 1.37 10*3/MM3 (ref 0.6–4.8)
LYMPHOCYTES # BLD AUTO: 1.4 10*3/MM3 (ref 0.6–4.8)
LYMPHOCYTES # BLD AUTO: 1.4 10*3/MM3 (ref 0.6–4.8)
LYMPHOCYTES # BLD AUTO: 1.46 10*3/MM3 (ref 0.6–4.8)
LYMPHOCYTES # BLD AUTO: 1.78 10*3/MM3 (ref 0.6–4.8)
LYMPHOCYTES # BLD MANUAL: 1.16 10*3/MM3 (ref 0.6–4.8)
LYMPHOCYTES NFR BLD AUTO: 11.9 % (ref 24–44)
LYMPHOCYTES NFR BLD AUTO: 15.7 % (ref 24–44)
LYMPHOCYTES NFR BLD AUTO: 16.6 % (ref 24–44)
LYMPHOCYTES NFR BLD AUTO: 17.3 % (ref 24–44)
LYMPHOCYTES NFR BLD AUTO: 22 % (ref 24–44)
LYMPHOCYTES NFR BLD AUTO: 5.8 % (ref 24–44)
LYMPHOCYTES NFR BLD AUTO: 6 % (ref 24–44)
LYMPHOCYTES NFR BLD AUTO: 7.7 % (ref 24–44)
LYMPHOCYTES NFR BLD AUTO: 8.7 % (ref 24–44)
LYMPHOCYTES NFR BLD MANUAL: 12 % (ref 24–44)
LYMPHOCYTES NFR BLD MANUAL: 9 % (ref 0–12)
Lab: ABNORMAL
MAGNESIUM SERPL-MCNC: 1.4 MG/DL (ref 1.3–2.7)
MAGNESIUM SERPL-MCNC: 1.5 MG/DL (ref 1.3–2.7)
MAGNESIUM SERPL-MCNC: 1.5 MG/DL (ref 1.3–2.7)
MAGNESIUM SERPL-MCNC: 1.6 MG/DL (ref 1.3–2.7)
MAGNESIUM SERPL-MCNC: 1.7 MG/DL (ref 1.3–2.7)
MAGNESIUM SERPL-MCNC: 1.8 MG/DL (ref 1.3–2.7)
MCH RBC QN AUTO: 30.2 PG (ref 27–31)
MCH RBC QN AUTO: 30.7 PG (ref 27–31)
MCH RBC QN AUTO: 31.3 PG (ref 27–31)
MCH RBC QN AUTO: 31.6 PG (ref 27–31)
MCH RBC QN AUTO: 31.9 PG (ref 27–31)
MCH RBC QN AUTO: 32.1 PG (ref 27–31)
MCH RBC QN AUTO: 32.7 PG (ref 27–31)
MCH RBC QN AUTO: 33.1 PG (ref 27–31)
MCH RBC QN AUTO: 33.3 PG (ref 27–31)
MCH RBC QN AUTO: 33.3 PG (ref 27–31)
MCH RBC QN AUTO: 33.7 PG (ref 27–31)
MCHC RBC AUTO-ENTMCNC: 31.1 G/DL (ref 32–36)
MCHC RBC AUTO-ENTMCNC: 32.1 G/DL (ref 32–36)
MCHC RBC AUTO-ENTMCNC: 32.2 G/DL (ref 32–36)
MCHC RBC AUTO-ENTMCNC: 32.5 G/DL (ref 32–36)
MCHC RBC AUTO-ENTMCNC: 32.7 G/DL (ref 32–36)
MCHC RBC AUTO-ENTMCNC: 32.7 G/DL (ref 32–36)
MCHC RBC AUTO-ENTMCNC: 33 G/DL (ref 32–36)
MCHC RBC AUTO-ENTMCNC: 33.1 G/DL (ref 32–36)
MCHC RBC AUTO-ENTMCNC: 33.4 G/DL (ref 32–36)
MCHC RBC AUTO-ENTMCNC: 33.5 G/DL (ref 32–36)
MCHC RBC AUTO-ENTMCNC: 34.3 G/DL (ref 32–36)
MCV RBC AUTO: 100 FL (ref 80–99)
MCV RBC AUTO: 100.8 FL (ref 80–99)
MCV RBC AUTO: 101.2 FL (ref 80–99)
MCV RBC AUTO: 95.3 FL (ref 80–99)
MCV RBC AUTO: 95.6 FL (ref 80–99)
MCV RBC AUTO: 96.5 FL (ref 80–99)
MCV RBC AUTO: 97 FL (ref 80–99)
MCV RBC AUTO: 97.2 FL (ref 80–99)
MCV RBC AUTO: 97.3 FL (ref 80–99)
MCV RBC AUTO: 99.1 FL (ref 80–99)
MCV RBC AUTO: 99.5 FL (ref 80–99)
MONOCYTES # BLD AUTO: 0.76 10*3/MM3 (ref 0–1)
MONOCYTES # BLD AUTO: 0.87 10*3/MM3 (ref 0–1)
MONOCYTES # BLD AUTO: 0.89 10*3/MM3 (ref 0–1)
MONOCYTES # BLD AUTO: 0.91 10*3/MM3 (ref 0–1)
MONOCYTES # BLD AUTO: 0.97 10*3/MM3 (ref 0–1)
MONOCYTES # BLD AUTO: 1.02 10*3/MM3 (ref 0–1)
MONOCYTES # BLD AUTO: 1.18 10*3/MM3 (ref 0–1)
MONOCYTES # BLD AUTO: 1.64 10*3/MM3 (ref 0–1)
MONOCYTES # BLD AUTO: 1.82 10*3/MM3 (ref 0–1)
MONOCYTES # BLD AUTO: 1.98 10*3/MM3 (ref 0–1)
MONOCYTES NFR BLD AUTO: 10.2 % (ref 0–12)
MONOCYTES NFR BLD AUTO: 11.2 % (ref 0–12)
MONOCYTES NFR BLD AUTO: 11.5 % (ref 0–12)
MONOCYTES NFR BLD AUTO: 14.3 % (ref 0–12)
MONOCYTES NFR BLD AUTO: 16.2 % (ref 0–12)
MONOCYTES NFR BLD AUTO: 16.8 % (ref 0–12)
MONOCYTES NFR BLD AUTO: 7.9 % (ref 0–12)
MONOCYTES NFR BLD AUTO: 8.5 % (ref 0–12)
MONOCYTES NFR BLD AUTO: 9.7 % (ref 0–12)
MR PISA EROA: 0.28 CM2
MV REGURGITANT FRACTION: 14 %
NEGATIVE CONTROL: NEGATIVE
NEUTROPHILS # BLD AUTO: 12.6 10*3/MM3 (ref 1.5–8.3)
NEUTROPHILS # BLD AUTO: 4.99 10*3/MM3 (ref 1.5–8.3)
NEUTROPHILS # BLD AUTO: 5.28 10*3/MM3 (ref 1.5–8.3)
NEUTROPHILS # BLD AUTO: 5.72 10*3/MM3 (ref 1.5–8.3)
NEUTROPHILS # BLD AUTO: 6.55 10*3/MM3 (ref 1.5–8.3)
NEUTROPHILS # BLD AUTO: 7.08 10*3/MM3 (ref 1.5–8.3)
NEUTROPHILS # BLD AUTO: 7.09 10*3/MM3 (ref 1.5–8.3)
NEUTROPHILS # BLD AUTO: 8.3 10*3/MM3 (ref 1.5–8.3)
NEUTROPHILS # BLD AUTO: 8.9 10*3/MM3 (ref 1.5–8.3)
NEUTROPHILS # BLD AUTO: 9.36 10*3/MM3 (ref 1.5–8.3)
NEUTROPHILS NFR BLD AUTO: 61.6 % (ref 41–71)
NEUTROPHILS NFR BLD AUTO: 63.9 % (ref 41–71)
NEUTROPHILS NFR BLD AUTO: 65.3 % (ref 41–71)
NEUTROPHILS NFR BLD AUTO: 67.5 % (ref 41–71)
NEUTROPHILS NFR BLD AUTO: 72.6 % (ref 41–71)
NEUTROPHILS NFR BLD AUTO: 73.6 % (ref 41–71)
NEUTROPHILS NFR BLD AUTO: 78.4 % (ref 41–71)
NEUTROPHILS NFR BLD AUTO: 78.8 % (ref 41–71)
NEUTROPHILS NFR BLD AUTO: 83.1 % (ref 41–71)
NEUTROPHILS NFR BLD MANUAL: 73 % (ref 41–71)
PHOSPHATE SERPL-MCNC: 4.7 MG/DL (ref 2.4–5.1)
PHOSPHATE SERPL-MCNC: 5.1 MG/DL (ref 2.4–5.1)
PHOSPHATE SERPL-MCNC: 5.3 MG/DL (ref 2.4–5.1)
PHOSPHATE SERPL-MCNC: 6.2 MG/DL (ref 2.4–5.1)
PHOSPHATE SERPL-MCNC: 6.5 MG/DL (ref 2.4–5.1)
PHOSPHATE SERPL-MCNC: 6.5 MG/DL (ref 2.4–5.1)
PHOSPHATE SERPL-MCNC: 6.6 MG/DL (ref 2.4–5.1)
PHOSPHATE SERPL-MCNC: 6.6 MG/DL (ref 2.4–5.1)
PISA ALIASING VEL: 3.2 M/S
PISA RADIUS: 0.8 CM
PLAT MORPH BLD: NORMAL
PLATELET # BLD AUTO: 140 10*3/MM3 (ref 150–450)
PLATELET # BLD AUTO: 145 10*3/MM3 (ref 150–450)
PLATELET # BLD AUTO: 148 10*3/MM3 (ref 150–450)
PLATELET # BLD AUTO: 173 10*3/MM3 (ref 150–450)
PLATELET # BLD AUTO: 174 10*3/MM3 (ref 150–450)
PLATELET # BLD AUTO: 184 10*3/MM3 (ref 150–450)
PLATELET # BLD AUTO: 190 10*3/MM3 (ref 150–450)
PLATELET # BLD AUTO: 191 10*3/MM3 (ref 150–450)
PLATELET # BLD AUTO: 203 10*3/MM3 (ref 150–450)
PLATELET # BLD AUTO: 220 10*3/MM3 (ref 150–450)
PLATELET # BLD AUTO: 95 10*3/MM3 (ref 150–450)
PMV BLD AUTO: 10 FL (ref 6–12)
PMV BLD AUTO: 10 FL (ref 6–12)
PMV BLD AUTO: 10.1 FL (ref 6–12)
PMV BLD AUTO: 10.2 FL (ref 6–12)
PMV BLD AUTO: 10.3 FL (ref 6–12)
PMV BLD AUTO: 10.5 FL (ref 6–12)
PMV BLD AUTO: 11.3 FL (ref 6–12)
PMV BLD AUTO: 9.3 FL (ref 6–12)
PMV BLD AUTO: 9.9 FL (ref 6–12)
POSITIVE CONTROL: POSITIVE
POTASSIUM BLD-SCNC: 3.4 MMOL/L (ref 3.5–5.5)
POTASSIUM BLD-SCNC: 3.7 MMOL/L (ref 3.5–5.5)
POTASSIUM BLD-SCNC: 3.8 MMOL/L (ref 3.5–5.5)
POTASSIUM BLD-SCNC: 3.9 MMOL/L (ref 3.5–5.5)
POTASSIUM BLD-SCNC: 4.1 MMOL/L (ref 3.5–5.5)
POTASSIUM BLD-SCNC: 4.2 MMOL/L (ref 3.5–5.5)
POTASSIUM BLD-SCNC: 4.3 MMOL/L (ref 3.5–5.5)
POTASSIUM BLD-SCNC: 4.4 MMOL/L (ref 3.5–5.5)
POTASSIUM BLD-SCNC: 4.6 MMOL/L (ref 3.5–5.5)
POTASSIUM BLD-SCNC: 4.9 MMOL/L (ref 3.5–5.5)
POTASSIUM BLD-SCNC: 5.2 MMOL/L (ref 3.5–5.5)
POTASSIUM BLD-SCNC: 5.2 MMOL/L (ref 3.5–5.5)
POTASSIUM BLDA-SCNC: 4.9 MMOL/L (ref 3.5–4.9)
PROCALCITONIN SERPL-MCNC: 0.56 NG/ML
PROCALCITONIN SERPL-MCNC: 0.58 NG/ML
PROT SERPL-MCNC: 5.3 G/DL (ref 5.7–8.2)
PROT SERPL-MCNC: 6.1 G/DL (ref 5.7–8.2)
PROT SERPL-MCNC: 6.3 G/DL (ref 5.7–8.2)
PROT SERPL-MCNC: 7.4 G/DL (ref 5.7–8.2)
PROT SERPL-MCNC: 7.9 G/DL (ref 5.7–8.2)
PROTHROMBIN TIME: 12 SECONDS (ref 9.6–11.5)
PROTHROMBIN TIME: 13 SECONDS (ref 9.6–11.5)
PROTHROMBIN TIME: >100 SECONDS (ref 9.6–11.5)
RBC # BLD AUTO: 1.92 10*6/MM3 (ref 4.2–5.76)
RBC # BLD AUTO: 1.96 10*6/MM3 (ref 4.2–5.76)
RBC # BLD AUTO: 2.2 10*6/MM3 (ref 4.2–5.76)
RBC # BLD AUTO: 2.42 10*6/MM3 (ref 4.2–5.76)
RBC # BLD AUTO: 2.46 10*6/MM3 (ref 4.2–5.76)
RBC # BLD AUTO: 2.77 10*6/MM3 (ref 4.2–5.76)
RBC # BLD AUTO: 2.82 10*6/MM3 (ref 4.2–5.76)
RBC # BLD AUTO: 2.91 10*6/MM3 (ref 4.2–5.76)
RBC # BLD AUTO: 2.94 10*6/MM3 (ref 4.2–5.76)
RBC # BLD AUTO: 3.04 10*6/MM3 (ref 4.2–5.76)
RBC # BLD AUTO: 3.8 10*6/MM3 (ref 4.2–5.76)
RBC MORPH BLD: NORMAL
RETICS/RBC NFR AUTO: 4.57 % (ref 0.5–1.5)
RH BLD: POSITIVE
SODIUM BLD-SCNC: 125 MMOL/L (ref 132–146)
SODIUM BLD-SCNC: 126 MMOL/L (ref 132–146)
SODIUM BLD-SCNC: 126 MMOL/L (ref 132–146)
SODIUM BLD-SCNC: 127 MMOL/L (ref 132–146)
SODIUM BLD-SCNC: 127 MMOL/L (ref 132–146)
SODIUM BLD-SCNC: 128 MMOL/L (ref 132–146)
SODIUM BLD-SCNC: 130 MMOL/L (ref 132–146)
SODIUM BLD-SCNC: 130 MMOL/L (ref 132–146)
SODIUM BLD-SCNC: 132 MMOL/L (ref 132–146)
SODIUM BLD-SCNC: 132 MMOL/L (ref 132–146)
SODIUM BLDA-SCNC: 131 MMOL/L (ref 138–146)
TROPONIN I SERPL-MCNC: 0 NG/ML (ref 0–0.07)
TROPONIN I SERPL-MCNC: 0.14 NG/ML
TROPONIN I SERPL-MCNC: 0.18 NG/ML
TROPONIN I SERPL-MCNC: 0.27 NG/ML
TROPONIN I SERPL-MCNC: <0.006 NG/ML
TSH SERPL DL<=0.05 MIU/L-ACNC: 2.88 MIU/ML (ref 0.35–5.35)
UNIT  ABO: NORMAL
UNIT  RH: NORMAL
WBC MORPH BLD: NORMAL
WBC NRBC COR # BLD: 10.52 10*3/MM3 (ref 3.5–10.8)
WBC NRBC COR # BLD: 10.85 10*3/MM3 (ref 3.5–10.8)
WBC NRBC COR # BLD: 11.26 10*3/MM3 (ref 3.5–10.8)
WBC NRBC COR # BLD: 11.37 10*3/MM3 (ref 3.5–10.8)
WBC NRBC COR # BLD: 12.26 10*3/MM3 (ref 3.5–10.8)
WBC NRBC COR # BLD: 16.08 10*3/MM3 (ref 3.5–10.8)
WBC NRBC COR # BLD: 8.1 10*3/MM3 (ref 3.5–10.8)
WBC NRBC COR # BLD: 8.26 10*3/MM3 (ref 3.5–10.8)
WBC NRBC COR # BLD: 8.46 10*3/MM3 (ref 3.5–10.8)
WBC NRBC COR # BLD: 8.91 10*3/MM3 (ref 3.5–10.8)
WBC NRBC COR # BLD: 9.7 10*3/MM3 (ref 3.5–10.8)
WHOLE BLOOD HOLD SPECIMEN: NORMAL
WHOLE BLOOD HOLD SPECIMEN: NORMAL

## 2017-01-01 PROCEDURE — 25010000002 FENTANYL CITRATE (PF) 100 MCG/2ML SOLUTION: Performed by: NURSE PRACTITIONER

## 2017-01-01 PROCEDURE — 99225 PR SBSQ OBSERVATION CARE/DAY 25 MINUTES: CPT | Performed by: INTERNAL MEDICINE

## 2017-01-01 PROCEDURE — 25010000002 SULFUR HEXAFLUORIDE MICROSPH 60.7-25 MG RECONSTITUTED SUSPENSION: Performed by: INTERNAL MEDICINE

## 2017-01-01 PROCEDURE — G0378 HOSPITAL OBSERVATION PER HR: HCPCS

## 2017-01-01 PROCEDURE — 83735 ASSAY OF MAGNESIUM: CPT | Performed by: NURSE PRACTITIONER

## 2017-01-01 PROCEDURE — 85014 HEMATOCRIT: CPT | Performed by: INTERNAL MEDICINE

## 2017-01-01 PROCEDURE — 25010000002 VITAMIN K1 PER 1 MG: Performed by: PHYSICIAN ASSISTANT

## 2017-01-01 PROCEDURE — 85025 COMPLETE CBC W/AUTO DIFF WBC: CPT | Performed by: INTERNAL MEDICINE

## 2017-01-01 PROCEDURE — 80053 COMPREHEN METABOLIC PANEL: CPT | Performed by: EMERGENCY MEDICINE

## 2017-01-01 PROCEDURE — 90945 DIALYSIS ONE EVALUATION: CPT

## 2017-01-01 PROCEDURE — 82330 ASSAY OF CALCIUM: CPT | Performed by: NURSE PRACTITIONER

## 2017-01-01 PROCEDURE — 80047 BASIC METABLC PNL IONIZED CA: CPT

## 2017-01-01 PROCEDURE — 93005 ELECTROCARDIOGRAM TRACING: CPT

## 2017-01-01 PROCEDURE — 86901 BLOOD TYPING SEROLOGIC RH(D): CPT | Performed by: PHYSICIAN ASSISTANT

## 2017-01-01 PROCEDURE — 83735 ASSAY OF MAGNESIUM: CPT | Performed by: INTERNAL MEDICINE

## 2017-01-01 PROCEDURE — 84484 ASSAY OF TROPONIN QUANT: CPT | Performed by: EMERGENCY MEDICINE

## 2017-01-01 PROCEDURE — 87086 URINE CULTURE/COLONY COUNT: CPT | Performed by: NURSE PRACTITIONER

## 2017-01-01 PROCEDURE — 71010 HC CHEST PA OR AP: CPT

## 2017-01-01 PROCEDURE — 87046 STOOL CULTR AEROBIC BACT EA: CPT | Performed by: EMERGENCY MEDICINE

## 2017-01-01 PROCEDURE — 85025 COMPLETE CBC W/AUTO DIFF WBC: CPT | Performed by: EMERGENCY MEDICINE

## 2017-01-01 PROCEDURE — P9046 ALBUMIN (HUMAN), 25%, 20 ML: HCPCS | Performed by: EMERGENCY MEDICINE

## 2017-01-01 PROCEDURE — 86900 BLOOD TYPING SEROLOGIC ABO: CPT

## 2017-01-01 PROCEDURE — 87040 BLOOD CULTURE FOR BACTERIA: CPT | Performed by: EMERGENCY MEDICINE

## 2017-01-01 PROCEDURE — 86923 COMPATIBILITY TEST ELECTRIC: CPT

## 2017-01-01 PROCEDURE — 0D598ZZ DESTRUCTION OF DUODENUM, VIA NATURAL OR ARTIFICIAL OPENING ENDOSCOPIC: ICD-10-PCS | Performed by: INTERNAL MEDICINE

## 2017-01-01 PROCEDURE — 83880 ASSAY OF NATRIURETIC PEPTIDE: CPT | Performed by: NURSE PRACTITIONER

## 2017-01-01 PROCEDURE — 80048 BASIC METABOLIC PNL TOTAL CA: CPT | Performed by: NURSE PRACTITIONER

## 2017-01-01 PROCEDURE — 93970 EXTREMITY STUDY: CPT

## 2017-01-01 PROCEDURE — 94799 UNLISTED PULMONARY SVC/PX: CPT

## 2017-01-01 PROCEDURE — 84132 ASSAY OF SERUM POTASSIUM: CPT | Performed by: NURSE PRACTITIONER

## 2017-01-01 PROCEDURE — 85018 HEMOGLOBIN: CPT | Performed by: INTERNAL MEDICINE

## 2017-01-01 PROCEDURE — 99213 OFFICE O/P EST LOW 20 MIN: CPT | Performed by: ANESTHESIOLOGY

## 2017-01-01 PROCEDURE — 94640 AIRWAY INHALATION TREATMENT: CPT

## 2017-01-01 PROCEDURE — 80162 ASSAY OF DIGOXIN TOTAL: CPT | Performed by: NURSE PRACTITIONER

## 2017-01-01 PROCEDURE — 84484 ASSAY OF TROPONIN QUANT: CPT | Performed by: INTERNAL MEDICINE

## 2017-01-01 PROCEDURE — 84100 ASSAY OF PHOSPHORUS: CPT | Performed by: NURSE PRACTITIONER

## 2017-01-01 PROCEDURE — C9132 KCENTRA, PER I.U.: HCPCS | Performed by: PHYSICIAN ASSISTANT

## 2017-01-01 PROCEDURE — 83605 ASSAY OF LACTIC ACID: CPT | Performed by: EMERGENCY MEDICINE

## 2017-01-01 PROCEDURE — 99291 CRITICAL CARE FIRST HOUR: CPT | Performed by: INTERNAL MEDICINE

## 2017-01-01 PROCEDURE — 25010000002 ONDANSETRON PER 1 MG: Performed by: PHYSICIAN ASSISTANT

## 2017-01-01 PROCEDURE — G8978 MOBILITY CURRENT STATUS: HCPCS

## 2017-01-01 PROCEDURE — 25010000002 HEPARIN (PORCINE) PER 1000 UNITS: Performed by: NURSE PRACTITIONER

## 2017-01-01 PROCEDURE — 80053 COMPREHEN METABOLIC PANEL: CPT | Performed by: INTERNAL MEDICINE

## 2017-01-01 PROCEDURE — 85007 BL SMEAR W/DIFF WBC COUNT: CPT | Performed by: NURSE PRACTITIONER

## 2017-01-01 PROCEDURE — 85018 HEMOGLOBIN: CPT | Performed by: PHYSICIAN ASSISTANT

## 2017-01-01 PROCEDURE — 86920 COMPATIBILITY TEST SPIN: CPT

## 2017-01-01 PROCEDURE — 80069 RENAL FUNCTION PANEL: CPT | Performed by: INTERNAL MEDICINE

## 2017-01-01 PROCEDURE — 95972 ALYS CPLX SP/PN NPGT W/PRGRM: CPT | Performed by: ANESTHESIOLOGY

## 2017-01-01 PROCEDURE — 83690 ASSAY OF LIPASE: CPT | Performed by: NURSE PRACTITIONER

## 2017-01-01 PROCEDURE — 83615 LACTATE (LD) (LDH) ENZYME: CPT | Performed by: INTERNAL MEDICINE

## 2017-01-01 PROCEDURE — 36430 TRANSFUSION BLD/BLD COMPNT: CPT

## 2017-01-01 PROCEDURE — 84484 ASSAY OF TROPONIN QUANT: CPT

## 2017-01-01 PROCEDURE — 96372 THER/PROPH/DIAG INJ SC/IM: CPT

## 2017-01-01 PROCEDURE — 99285 EMERGENCY DEPT VISIT HI MDM: CPT

## 2017-01-01 PROCEDURE — 84484 ASSAY OF TROPONIN QUANT: CPT | Performed by: NURSE PRACTITIONER

## 2017-01-01 PROCEDURE — 99213 OFFICE O/P EST LOW 20 MIN: CPT | Performed by: INTERNAL MEDICINE

## 2017-01-01 PROCEDURE — 83605 ASSAY OF LACTIC ACID: CPT | Performed by: INTERNAL MEDICINE

## 2017-01-01 PROCEDURE — 86900 BLOOD TYPING SEROLOGIC ABO: CPT | Performed by: INTERNAL MEDICINE

## 2017-01-01 PROCEDURE — 86850 RBC ANTIBODY SCREEN: CPT | Performed by: PHYSICIAN ASSISTANT

## 2017-01-01 PROCEDURE — 80048 BASIC METABOLIC PNL TOTAL CA: CPT | Performed by: INTERNAL MEDICINE

## 2017-01-01 PROCEDURE — 87493 C DIFF AMPLIFIED PROBE: CPT | Performed by: EMERGENCY MEDICINE

## 2017-01-01 PROCEDURE — 99233 SBSQ HOSP IP/OBS HIGH 50: CPT | Performed by: INTERNAL MEDICINE

## 2017-01-01 PROCEDURE — 95909 NRV CNDJ TST 5-6 STUDIES: CPT

## 2017-01-01 PROCEDURE — 80048 BASIC METABOLIC PNL TOTAL CA: CPT | Performed by: PHYSICIAN ASSISTANT

## 2017-01-01 PROCEDURE — 87186 SC STD MICRODIL/AGAR DIL: CPT | Performed by: NURSE PRACTITIONER

## 2017-01-01 PROCEDURE — G8980 MOBILITY D/C STATUS: HCPCS

## 2017-01-01 PROCEDURE — 85025 COMPLETE CBC W/AUTO DIFF WBC: CPT | Performed by: NURSE PRACTITIONER

## 2017-01-01 PROCEDURE — 83605 ASSAY OF LACTIC ACID: CPT | Performed by: PHYSICIAN ASSISTANT

## 2017-01-01 PROCEDURE — 25010000002 DAPTOMYCIN PER 1 MG: Performed by: NURSE PRACTITIONER

## 2017-01-01 PROCEDURE — 80053 COMPREHEN METABOLIC PANEL: CPT | Performed by: NURSE PRACTITIONER

## 2017-01-01 PROCEDURE — 86850 RBC ANTIBODY SCREEN: CPT | Performed by: INTERNAL MEDICINE

## 2017-01-01 PROCEDURE — 83735 ASSAY OF MAGNESIUM: CPT | Performed by: EMERGENCY MEDICINE

## 2017-01-01 PROCEDURE — 87205 SMEAR GRAM STAIN: CPT | Performed by: NURSE PRACTITIONER

## 2017-01-01 PROCEDURE — 85014 HEMATOCRIT: CPT | Performed by: PHYSICIAN ASSISTANT

## 2017-01-01 PROCEDURE — 84145 PROCALCITONIN (PCT): CPT | Performed by: INTERNAL MEDICINE

## 2017-01-01 PROCEDURE — 84100 ASSAY OF PHOSPHORUS: CPT | Performed by: INTERNAL MEDICINE

## 2017-01-01 PROCEDURE — 99220 PR INITIAL OBSERVATION CARE/DAY 70 MINUTES: CPT | Performed by: HOSPITALIST

## 2017-01-01 PROCEDURE — 99222 1ST HOSP IP/OBS MODERATE 55: CPT | Performed by: INTERNAL MEDICINE

## 2017-01-01 PROCEDURE — 99217 PR OBSERVATION CARE DISCHARGE MANAGEMENT: CPT | Performed by: PHYSICIAN ASSISTANT

## 2017-01-01 PROCEDURE — 84100 ASSAY OF PHOSPHORUS: CPT | Performed by: EMERGENCY MEDICINE

## 2017-01-01 PROCEDURE — 99239 HOSP IP/OBS DSCHRG MGMT >30: CPT | Performed by: NURSE PRACTITIONER

## 2017-01-01 PROCEDURE — 99284 EMERGENCY DEPT VISIT MOD MDM: CPT

## 2017-01-01 PROCEDURE — 86901 BLOOD TYPING SEROLOGIC RH(D): CPT | Performed by: INTERNAL MEDICINE

## 2017-01-01 PROCEDURE — 82533 TOTAL CORTISOL: CPT | Performed by: INTERNAL MEDICINE

## 2017-01-01 PROCEDURE — 25010000002 DIGOXIN PER 500 MCG: Performed by: INTERNAL MEDICINE

## 2017-01-01 PROCEDURE — 85027 COMPLETE CBC AUTOMATED: CPT | Performed by: NURSE PRACTITIONER

## 2017-01-01 PROCEDURE — 99225 PR SBSQ OBSERVATION CARE/DAY 25 MINUTES: CPT | Performed by: PHYSICIAN ASSISTANT

## 2017-01-01 PROCEDURE — 87040 BLOOD CULTURE FOR BACTERIA: CPT | Performed by: PHYSICIAN ASSISTANT

## 2017-01-01 PROCEDURE — 97161 PT EVAL LOW COMPLEX 20 MIN: CPT

## 2017-01-01 PROCEDURE — P9016 RBC LEUKOCYTES REDUCED: HCPCS

## 2017-01-01 PROCEDURE — 84443 ASSAY THYROID STIM HORMONE: CPT | Performed by: NURSE PRACTITIONER

## 2017-01-01 PROCEDURE — 25010000002 ALBUMIN HUMAN 25% PER 50 ML: Performed by: EMERGENCY MEDICINE

## 2017-01-01 PROCEDURE — 83010 ASSAY OF HAPTOGLOBIN QUANT: CPT | Performed by: INTERNAL MEDICINE

## 2017-01-01 PROCEDURE — 85730 THROMBOPLASTIN TIME PARTIAL: CPT | Performed by: INTERNAL MEDICINE

## 2017-01-01 PROCEDURE — 87077 CULTURE AEROBIC IDENTIFY: CPT | Performed by: NURSE PRACTITIONER

## 2017-01-01 PROCEDURE — 25010000002 MAGNESIUM SULFATE 2 GM/50ML SOLUTION: Performed by: NURSE PRACTITIONER

## 2017-01-01 PROCEDURE — 83690 ASSAY OF LIPASE: CPT | Performed by: EMERGENCY MEDICINE

## 2017-01-01 PROCEDURE — 85610 PROTHROMBIN TIME: CPT | Performed by: PHYSICIAN ASSISTANT

## 2017-01-01 PROCEDURE — 25010000002 HYDROMORPHONE PER 4 MG: Performed by: EMERGENCY MEDICINE

## 2017-01-01 PROCEDURE — 84145 PROCALCITONIN (PCT): CPT | Performed by: NURSE PRACTITIONER

## 2017-01-01 PROCEDURE — 25010000002 DIGOXIN PER 500 MCG: Performed by: PHYSICIAN ASSISTANT

## 2017-01-01 PROCEDURE — 85045 AUTOMATED RETICULOCYTE COUNT: CPT | Performed by: INTERNAL MEDICINE

## 2017-01-01 PROCEDURE — 85610 PROTHROMBIN TIME: CPT | Performed by: INTERNAL MEDICINE

## 2017-01-01 PROCEDURE — 96365 THER/PROPH/DIAG IV INF INIT: CPT

## 2017-01-01 PROCEDURE — 25010000002 PROTHROMBIN COMPLEX CONC HUMAN 1000 UNITS KIT: Performed by: PHYSICIAN ASSISTANT

## 2017-01-01 PROCEDURE — 87070 CULTURE OTHR SPECIMN AEROBIC: CPT | Performed by: NURSE PRACTITIONER

## 2017-01-01 PROCEDURE — 82270 OCCULT BLOOD FECES: CPT | Performed by: PHYSICIAN ASSISTANT

## 2017-01-01 PROCEDURE — C8929 TTE W OR WO FOL WCON,DOPPLER: HCPCS

## 2017-01-01 PROCEDURE — 87045 FECES CULTURE AEROBIC BACT: CPT | Performed by: EMERGENCY MEDICINE

## 2017-01-01 PROCEDURE — 3E1M39Z IRRIGATION OF PERITONEAL CAVITY USING DIALYSATE, PERCUTANEOUS APPROACH: ICD-10-PCS | Performed by: INTERNAL MEDICINE

## 2017-01-01 PROCEDURE — 95886 MUSC TEST DONE W/N TEST COMP: CPT

## 2017-01-01 PROCEDURE — 99226 PR SBSQ OBSERVATION CARE/DAY 35 MINUTES: CPT | Performed by: INTERNAL MEDICINE

## 2017-01-01 PROCEDURE — 94760 N-INVAS EAR/PLS OXIMETRY 1: CPT

## 2017-01-01 PROCEDURE — 86901 BLOOD TYPING SEROLOGIC RH(D): CPT

## 2017-01-01 PROCEDURE — 85007 BL SMEAR W/DIFF WBC COUNT: CPT | Performed by: INTERNAL MEDICINE

## 2017-01-01 PROCEDURE — 93970 EXTREMITY STUDY: CPT | Performed by: INTERNAL MEDICINE

## 2017-01-01 PROCEDURE — 87493 C DIFF AMPLIFIED PROBE: CPT | Performed by: NURSE PRACTITIONER

## 2017-01-01 PROCEDURE — 86900 BLOOD TYPING SEROLOGIC ABO: CPT | Performed by: PHYSICIAN ASSISTANT

## 2017-01-01 PROCEDURE — 85014 HEMATOCRIT: CPT

## 2017-01-01 DEVICE — DEV CLIP ENDO RESOLUTION360 CONTRL ROT 235CM: Type: IMPLANTABLE DEVICE | Site: ESOPHAGUS | Status: FUNCTIONAL

## 2017-01-01 RX ORDER — RANITIDINE 150 MG/1
150 TABLET ORAL 2 TIMES DAILY
COMMUNITY
End: 2017-01-01 | Stop reason: HOSPADM

## 2017-01-01 RX ORDER — HYDROCODONE BITARTRATE AND ACETAMINOPHEN 10; 325 MG/1; MG/1
1 TABLET ORAL ONCE AS NEEDED
Status: COMPLETED | OUTPATIENT
Start: 2017-01-01 | End: 2017-01-01

## 2017-01-01 RX ORDER — SENNA AND DOCUSATE SODIUM 50; 8.6 MG/1; MG/1
2 TABLET, FILM COATED ORAL NIGHTLY
Qty: 60 TABLET | Refills: 0 | Status: SHIPPED | OUTPATIENT
Start: 2017-01-01 | End: 2017-01-01

## 2017-01-01 RX ORDER — AMLODIPINE BESYLATE AND BENAZEPRIL HYDROCHLORIDE 2.5; 1 MG/1; MG/1
1 CAPSULE ORAL DAILY
COMMUNITY
End: 2017-01-01

## 2017-01-01 RX ORDER — METOPROLOL TARTRATE 50 MG/1
50 TABLET, FILM COATED ORAL EVERY 12 HOURS SCHEDULED
Qty: 60 TABLET | Refills: 0 | Status: SHIPPED | OUTPATIENT
Start: 2017-01-01 | End: 2017-01-01

## 2017-01-01 RX ORDER — HEPARIN SODIUM 5000 [USP'U]/ML
5000 INJECTION, SOLUTION INTRAVENOUS; SUBCUTANEOUS EVERY 8 HOURS SCHEDULED
Status: DISCONTINUED | OUTPATIENT
Start: 2017-01-01 | End: 2017-01-01 | Stop reason: HOSPADM

## 2017-01-01 RX ORDER — SENNA AND DOCUSATE SODIUM 50; 8.6 MG/1; MG/1
2 TABLET, FILM COATED ORAL NIGHTLY
Status: DISCONTINUED | OUTPATIENT
Start: 2017-01-01 | End: 2017-01-01 | Stop reason: HOSPADM

## 2017-01-01 RX ORDER — ONDANSETRON 2 MG/ML
4 INJECTION INTRAMUSCULAR; INTRAVENOUS ONCE
Status: COMPLETED | OUTPATIENT
Start: 2017-01-01 | End: 2017-01-01

## 2017-01-01 RX ORDER — ALBUMIN (HUMAN) 12.5 G/50ML
12.5 SOLUTION INTRAVENOUS ONCE
Status: COMPLETED | OUTPATIENT
Start: 2017-01-01 | End: 2017-01-01

## 2017-01-01 RX ORDER — SODIUM CHLORIDE, SODIUM LACTATE, CALCIUM CHLORIDE, MAGNESIUM CHLORIDE AND DEXTROSE 2.5; 538; 448; 25.7; 5.08 G/100ML; MG/100ML; MG/100ML; MG/100ML; MG/100ML
2000 INJECTION, SOLUTION INTRAPERITONEAL AS NEEDED
Status: DISCONTINUED | OUTPATIENT
Start: 2017-01-01 | End: 2017-01-01

## 2017-01-01 RX ORDER — SODIUM CHLORIDE 0.9 % (FLUSH) 0.9 %
10 SYRINGE (ML) INJECTION AS NEEDED
Status: DISCONTINUED | OUTPATIENT
Start: 2017-01-01 | End: 2017-01-01 | Stop reason: HOSPADM

## 2017-01-01 RX ORDER — HYDROCODONE BITARTRATE AND ACETAMINOPHEN 10; 325 MG/1; MG/1
1 TABLET ORAL EVERY 4 HOURS PRN
Status: DISCONTINUED | OUTPATIENT
Start: 2017-01-01 | End: 2017-01-01 | Stop reason: HOSPADM

## 2017-01-01 RX ORDER — SODIUM CHLORIDE 0.9 % (FLUSH) 0.9 %
1-10 SYRINGE (ML) INJECTION AS NEEDED
Status: DISCONTINUED | OUTPATIENT
Start: 2017-01-01 | End: 2017-01-01 | Stop reason: HOSPADM

## 2017-01-01 RX ORDER — SODIUM CHLORIDE, SODIUM LACTATE, CALCIUM CHLORIDE, MAGNESIUM CHLORIDE AND DEXTROSE 2.5; 538; 448; 18.3; 5.08 G/100ML; MG/100ML; MG/100ML; MG/100ML; MG/100ML
2000 INJECTION, SOLUTION INTRAPERITONEAL ONCE
Status: COMPLETED | OUTPATIENT
Start: 2017-01-01 | End: 2017-01-01

## 2017-01-01 RX ORDER — MAGNESIUM SULFATE HEPTAHYDRATE 40 MG/ML
2 INJECTION, SOLUTION INTRAVENOUS ONCE
Status: COMPLETED | OUTPATIENT
Start: 2017-01-01 | End: 2017-01-01

## 2017-01-01 RX ORDER — PANTOPRAZOLE SODIUM 40 MG/10ML
80 INJECTION, POWDER, LYOPHILIZED, FOR SOLUTION INTRAVENOUS ONCE
Status: COMPLETED | OUTPATIENT
Start: 2017-01-01 | End: 2017-01-01

## 2017-01-01 RX ORDER — DILTIAZEM HYDROCHLORIDE 240 MG/1
240 CAPSULE, COATED, EXTENDED RELEASE ORAL DAILY
COMMUNITY
End: 2017-01-01 | Stop reason: HOSPADM

## 2017-01-01 RX ORDER — SENNA AND DOCUSATE SODIUM 50; 8.6 MG/1; MG/1
2 TABLET, FILM COATED ORAL NIGHTLY PRN
Status: DISCONTINUED | OUTPATIENT
Start: 2017-01-01 | End: 2017-01-01 | Stop reason: HOSPADM

## 2017-01-01 RX ORDER — HYDROCODONE BITARTRATE AND ACETAMINOPHEN 5; 325 MG/1; MG/1
1 TABLET ORAL ONCE
Status: COMPLETED | OUTPATIENT
Start: 2017-01-01 | End: 2017-01-01

## 2017-01-01 RX ORDER — POLYETHYLENE GLYCOL 3350 17 G
2 POWDER IN PACKET (EA) ORAL EVERY 4 HOURS PRN
Status: DISCONTINUED | OUTPATIENT
Start: 2017-01-01 | End: 2017-01-01 | Stop reason: HOSPADM

## 2017-01-01 RX ORDER — SODIUM CHLORIDE, SODIUM LACTATE, CALCIUM CHLORIDE, MAGNESIUM CHLORIDE AND DEXTROSE 1.5; 538; 448; 25.7; 5.08 G/100ML; MG/100ML; MG/100ML; MG/100ML; MG/100ML
2000 INJECTION, SOLUTION INTRAPERITONEAL AS NEEDED
Status: DISCONTINUED | OUTPATIENT
Start: 2017-01-01 | End: 2017-01-01

## 2017-01-01 RX ORDER — PROMETHAZINE HYDROCHLORIDE 12.5 MG/1
6.25 TABLET ORAL EVERY 6 HOURS PRN
Status: DISCONTINUED | OUTPATIENT
Start: 2017-01-01 | End: 2017-01-01 | Stop reason: HOSPADM

## 2017-01-01 RX ORDER — SEVELAMER CARBONATE FOR ORAL SUSPENSION 800 MG/1
1600 POWDER, FOR SUSPENSION ORAL
Status: DISCONTINUED | OUTPATIENT
Start: 2017-01-01 | End: 2017-01-01

## 2017-01-01 RX ORDER — SACCHAROMYCES BOULARDII 250 MG
250 CAPSULE ORAL 2 TIMES DAILY
Status: DISCONTINUED | OUTPATIENT
Start: 2017-01-01 | End: 2017-01-01 | Stop reason: HOSPADM

## 2017-01-01 RX ORDER — SODIUM CHLORIDE, SODIUM LACTATE, CALCIUM CHLORIDE, MAGNESIUM CHLORIDE AND DEXTROSE 2.5; 538; 448; 18.3; 5.08 G/100ML; MG/100ML; MG/100ML; MG/100ML; MG/100ML
2000 INJECTION, SOLUTION INTRAPERITONEAL AS NEEDED
Status: DISCONTINUED | OUTPATIENT
Start: 2017-01-01 | End: 2017-01-01

## 2017-01-01 RX ORDER — METOPROLOL TARTRATE 50 MG/1
50 TABLET, FILM COATED ORAL EVERY 12 HOURS SCHEDULED
Status: DISCONTINUED | OUTPATIENT
Start: 2017-01-01 | End: 2017-01-01 | Stop reason: HOSPADM

## 2017-01-01 RX ORDER — DILTIAZEM HYDROCHLORIDE 60 MG/1
60 TABLET, FILM COATED ORAL EVERY 6 HOURS SCHEDULED
Qty: 120 TABLET | Refills: 0 | Status: SHIPPED | OUTPATIENT
Start: 2017-01-01 | End: 2017-01-01 | Stop reason: SINTOL

## 2017-01-01 RX ORDER — POTASSIUM CITRATE 10 MEQ/1
20 TABLET, EXTENDED RELEASE ORAL ONCE
Status: COMPLETED | OUTPATIENT
Start: 2017-01-01 | End: 2017-01-01

## 2017-01-01 RX ORDER — SEVELAMER HYDROCHLORIDE 800 MG/1
1600 TABLET, FILM COATED ORAL
Status: DISCONTINUED | OUTPATIENT
Start: 2017-01-01 | End: 2017-01-01 | Stop reason: HOSPADM

## 2017-01-01 RX ORDER — WARFARIN SODIUM 10 MG/1
10 TABLET ORAL
COMMUNITY
End: 2017-01-01 | Stop reason: HOSPADM

## 2017-01-01 RX ORDER — MIDODRINE HYDROCHLORIDE 5 MG/1
TABLET ORAL
Status: ON HOLD | COMMUNITY
Start: 2017-01-01 | End: 2018-01-01

## 2017-01-01 RX ORDER — SODIUM CHLORIDE, SODIUM LACTATE, CALCIUM CHLORIDE, MAGNESIUM CHLORIDE AND DEXTROSE 2.5; 538; 448; 25.7; 5.08 G/100ML; MG/100ML; MG/100ML; MG/100ML; MG/100ML
2000 INJECTION, SOLUTION INTRAPERITONEAL EVERY 4 HOURS
Status: DISCONTINUED | OUTPATIENT
Start: 2017-01-01 | End: 2017-01-01 | Stop reason: HOSPADM

## 2017-01-01 RX ORDER — SODIUM CHLORIDE, SODIUM LACTATE, CALCIUM CHLORIDE, MAGNESIUM CHLORIDE AND DEXTROSE 4.25; 538; 448; 25.7; 5.08 G/100ML; MG/100ML; MG/100ML; MG/100ML; MG/100ML
2000 INJECTION, SOLUTION INTRAPERITONEAL AS NEEDED
Status: DISCONTINUED | OUTPATIENT
Start: 2017-01-01 | End: 2017-01-01 | Stop reason: HOSPADM

## 2017-01-01 RX ORDER — WARFARIN SODIUM 10 MG/1
TABLET ORAL
Status: ON HOLD | COMMUNITY
End: 2018-01-01

## 2017-01-01 RX ORDER — SODIUM CHLORIDE, SODIUM LACTATE, CALCIUM CHLORIDE, MAGNESIUM CHLORIDE AND DEXTROSE 2.5; 538; 448; 25.7; 5.08 G/100ML; MG/100ML; MG/100ML; MG/100ML; MG/100ML
2500 INJECTION, SOLUTION INTRAPERITONEAL
Status: DISCONTINUED | OUTPATIENT
Start: 2017-01-01 | End: 2017-01-01

## 2017-01-01 RX ORDER — IPRATROPIUM BROMIDE AND ALBUTEROL SULFATE 2.5; .5 MG/3ML; MG/3ML
3 SOLUTION RESPIRATORY (INHALATION) EVERY 6 HOURS PRN
Status: DISCONTINUED | OUTPATIENT
Start: 2017-01-01 | End: 2017-01-01 | Stop reason: HOSPADM

## 2017-01-01 RX ORDER — SACCHAROMYCES BOULARDII 250 MG
250 CAPSULE ORAL 2 TIMES DAILY
Qty: 30 CAPSULE | Refills: 0 | Status: SHIPPED | OUTPATIENT
Start: 2017-01-01 | End: 2017-01-01

## 2017-01-01 RX ORDER — MIDODRINE HYDROCHLORIDE 5 MG/1
5 TABLET ORAL
Qty: 14 TABLET | Refills: 0 | Status: SHIPPED | OUTPATIENT
Start: 2017-01-01 | End: 2017-01-01

## 2017-01-01 RX ORDER — CALCITRIOL 0.5 UG/1
0.5 CAPSULE, LIQUID FILLED ORAL SEE ADMIN INSTRUCTIONS
COMMUNITY
End: 2017-01-01 | Stop reason: HOSPADM

## 2017-01-01 RX ORDER — GLYCOPYRROLATE 0.2 MG/ML
INJECTION INTRAMUSCULAR; INTRAVENOUS AS NEEDED
Status: DISCONTINUED | OUTPATIENT
Start: 2017-01-01 | End: 2017-01-01 | Stop reason: SURG

## 2017-01-01 RX ORDER — PROMETHAZINE HYDROCHLORIDE 25 MG/ML
6.25 INJECTION, SOLUTION INTRAMUSCULAR; INTRAVENOUS EVERY 6 HOURS PRN
Status: DISCONTINUED | OUTPATIENT
Start: 2017-01-01 | End: 2017-01-01 | Stop reason: HOSPADM

## 2017-01-01 RX ORDER — MULTIVITAMIN,THER AND MINERALS
1 TABLET ORAL DAILY
COMMUNITY
End: 2017-01-01 | Stop reason: HOSPADM

## 2017-01-01 RX ORDER — ONDANSETRON 2 MG/ML
4 INJECTION INTRAMUSCULAR; INTRAVENOUS ONCE
Status: DISCONTINUED | OUTPATIENT
Start: 2017-01-01 | End: 2017-01-01

## 2017-01-01 RX ORDER — FAMOTIDINE 20 MG/1
20 TABLET, FILM COATED ORAL 2 TIMES DAILY
Qty: 60 TABLET | Refills: 0 | Status: ON HOLD | OUTPATIENT
Start: 2017-01-01 | End: 2018-01-01

## 2017-01-01 RX ORDER — ACETAMINOPHEN 325 MG/1
650 TABLET ORAL EVERY 4 HOURS PRN
Status: DISCONTINUED | OUTPATIENT
Start: 2017-01-01 | End: 2017-01-01 | Stop reason: HOSPADM

## 2017-01-01 RX ORDER — FAMOTIDINE 20 MG/1
20 TABLET, FILM COATED ORAL DAILY
Status: DISCONTINUED | OUTPATIENT
Start: 2017-01-01 | End: 2017-01-01 | Stop reason: HOSPADM

## 2017-01-01 RX ORDER — SODIUM CHLORIDE, SODIUM LACTATE, CALCIUM CHLORIDE, MAGNESIUM CHLORIDE AND DEXTROSE 4.25; 538; 448; 25.7; 5.08 G/100ML; MG/100ML; MG/100ML; MG/100ML; MG/100ML
2000 INJECTION, SOLUTION INTRAPERITONEAL
Status: DISCONTINUED | OUTPATIENT
Start: 2017-01-01 | End: 2017-01-01

## 2017-01-01 RX ORDER — SODIUM CHLORIDE, SODIUM LACTATE, CALCIUM CHLORIDE, MAGNESIUM CHLORIDE AND DEXTROSE 2.5; 538; 448; 18.3; 5.08 G/100ML; MG/100ML; MG/100ML; MG/100ML; MG/100ML
2000 INJECTION, SOLUTION INTRAPERITONEAL
Status: DISCONTINUED | OUTPATIENT
Start: 2017-01-01 | End: 2017-01-01

## 2017-01-01 RX ORDER — FAMOTIDINE 20 MG/1
20 TABLET, FILM COATED ORAL 2 TIMES DAILY
Status: DISCONTINUED | OUTPATIENT
Start: 2017-01-01 | End: 2017-01-01 | Stop reason: HOSPADM

## 2017-01-01 RX ORDER — DIGOXIN 0.25 MG/ML
250 INJECTION INTRAMUSCULAR; INTRAVENOUS ONCE
Status: COMPLETED | OUTPATIENT
Start: 2017-01-01 | End: 2017-01-01

## 2017-01-01 RX ORDER — LIDOCAINE 50 MG/G
3 PATCH TOPICAL DAILY PRN
Status: DISCONTINUED | OUTPATIENT
Start: 2017-01-01 | End: 2017-01-01 | Stop reason: HOSPADM

## 2017-01-01 RX ORDER — HYDROCODONE BITARTRATE AND ACETAMINOPHEN 10; 325 MG/1; MG/1
1 TABLET ORAL ONCE
Status: COMPLETED | OUTPATIENT
Start: 2017-01-01 | End: 2017-01-01

## 2017-01-01 RX ORDER — PROMETHAZINE HYDROCHLORIDE 12.5 MG/1
6.25 SUPPOSITORY RECTAL EVERY 6 HOURS PRN
Status: DISCONTINUED | OUTPATIENT
Start: 2017-01-01 | End: 2017-01-01 | Stop reason: HOSPADM

## 2017-01-01 RX ORDER — FENTANYL CITRATE 50 UG/ML
25 INJECTION, SOLUTION INTRAMUSCULAR; INTRAVENOUS EVERY 4 HOURS PRN
Status: DISCONTINUED | OUTPATIENT
Start: 2017-01-01 | End: 2017-01-01

## 2017-01-01 RX ORDER — SODIUM CHLORIDE 9 MG/ML
100 INJECTION, SOLUTION INTRAVENOUS CONTINUOUS
Status: DISCONTINUED | OUTPATIENT
Start: 2017-01-01 | End: 2017-01-01

## 2017-01-01 RX ORDER — MIDODRINE HYDROCHLORIDE 5 MG/1
5 TABLET ORAL
Status: DISCONTINUED | OUTPATIENT
Start: 2017-01-01 | End: 2017-01-01 | Stop reason: HOSPADM

## 2017-01-01 RX ORDER — PANTOPRAZOLE SODIUM 40 MG/1
40 TABLET, DELAYED RELEASE ORAL
Status: DISCONTINUED | OUTPATIENT
Start: 2017-01-01 | End: 2017-01-01

## 2017-01-01 RX ORDER — INDOMETHACIN 50 MG/1
50 CAPSULE ORAL DAILY PRN
COMMUNITY
Start: 2017-01-01

## 2017-01-01 RX ORDER — COLCHICINE 0.6 MG/1
0.6 TABLET, FILM COATED ORAL DAILY PRN
COMMUNITY
Start: 2017-01-01

## 2017-01-01 RX ORDER — IPRATROPIUM BROMIDE AND ALBUTEROL SULFATE 2.5; .5 MG/3ML; MG/3ML
3 SOLUTION RESPIRATORY (INHALATION)
Status: DISCONTINUED | OUTPATIENT
Start: 2017-01-01 | End: 2017-01-01 | Stop reason: HOSPADM

## 2017-01-01 RX ORDER — ESMOLOL HYDROCHLORIDE 10 MG/ML
INJECTION INTRAVENOUS AS NEEDED
Status: DISCONTINUED | OUTPATIENT
Start: 2017-01-01 | End: 2017-01-01 | Stop reason: SURG

## 2017-01-01 RX ORDER — DILTIAZEM HYDROCHLORIDE 60 MG/1
60 TABLET, FILM COATED ORAL EVERY 6 HOURS SCHEDULED
Status: DISCONTINUED | OUTPATIENT
Start: 2017-01-01 | End: 2017-01-01 | Stop reason: HOSPADM

## 2017-01-01 RX ORDER — METRONIDAZOLE 500 MG/1
500 TABLET ORAL 4 TIMES DAILY
Qty: 40 TABLET | Refills: 0 | Status: SHIPPED | OUTPATIENT
Start: 2017-01-01 | End: 2017-01-01

## 2017-01-01 RX ORDER — SODIUM CHLORIDE, SODIUM LACTATE, CALCIUM CHLORIDE, MAGNESIUM CHLORIDE AND DEXTROSE 1.5; 538; 448; 25.7; 5.08 G/100ML; MG/100ML; MG/100ML; MG/100ML; MG/100ML
2000 INJECTION, SOLUTION INTRAPERITONEAL
Status: DISCONTINUED | OUTPATIENT
Start: 2017-01-01 | End: 2017-01-01

## 2017-01-01 RX ORDER — HYDROMORPHONE HYDROCHLORIDE 1 MG/ML
0.5 INJECTION, SOLUTION INTRAMUSCULAR; INTRAVENOUS; SUBCUTANEOUS ONCE
Status: COMPLETED | OUTPATIENT
Start: 2017-01-01 | End: 2017-01-01

## 2017-01-01 RX ORDER — METRONIDAZOLE 500 MG/1
500 TABLET ORAL 4 TIMES DAILY
COMMUNITY
Start: 2017-01-01 | End: 2017-01-01

## 2017-01-01 RX ORDER — EVOLOCUMAB 140 MG/ML
140 INJECTION, SOLUTION SUBCUTANEOUS
COMMUNITY
Start: 2017-01-01

## 2017-01-01 RX ORDER — HYDROCODONE BITARTRATE AND ACETAMINOPHEN 7.5; 325 MG/1; MG/1
1 TABLET ORAL EVERY 4 HOURS PRN
Status: DISCONTINUED | OUTPATIENT
Start: 2017-01-01 | End: 2017-01-01

## 2017-01-01 RX ORDER — METRONIDAZOLE 500 MG/1
500 TABLET ORAL ONCE
Status: COMPLETED | OUTPATIENT
Start: 2017-01-01 | End: 2017-01-01

## 2017-01-01 RX ADMIN — FENTANYL CITRATE 25 MCG: 50 INJECTION, SOLUTION INTRAMUSCULAR; INTRAVENOUS at 07:38

## 2017-01-01 RX ADMIN — ESMOLOL HYDROCHLORIDE 50 MG: 10 INJECTION, SOLUTION INTRAVENOUS at 12:51

## 2017-01-01 RX ADMIN — Medication 2 TABLET: at 22:47

## 2017-01-01 RX ADMIN — SODIUM CHLORIDE, SODIUM LACTATE, CALCIUM CHLORIDE, MAGNESIUM CHLORIDE AND DEXTROSE 2500 ML: 2.5; 538; 448; 25.7; 5.08 INJECTION, SOLUTION INTRAPERITONEAL at 14:15

## 2017-01-01 RX ADMIN — HYDROCODONE BITARTRATE AND ACETAMINOPHEN 1 TABLET: 7.5; 325 TABLET ORAL at 00:26

## 2017-01-01 RX ADMIN — MIDODRINE HYDROCHLORIDE 5 MG: 5 TABLET ORAL at 17:17

## 2017-01-01 RX ADMIN — DILTIAZEM HYDROCHLORIDE 60 MG: 60 TABLET, FILM COATED ORAL at 18:17

## 2017-01-01 RX ADMIN — HEPARIN SODIUM 5000 UNITS: 5000 INJECTION, SOLUTION INTRAVENOUS; SUBCUTANEOUS at 13:36

## 2017-01-01 RX ADMIN — FAMOTIDINE 20 MG: 20 TABLET ORAL at 08:30

## 2017-01-01 RX ADMIN — SULFUR HEXAFLUORIDE 2 ML: KIT at 09:45

## 2017-01-01 RX ADMIN — SODIUM CHLORIDE, SODIUM LACTATE, CALCIUM CHLORIDE, MAGNESIUM CHLORIDE AND DEXTROSE 2000 ML: 1.5; 538; 448; 25.7; 5.08 INJECTION, SOLUTION INTRAPERITONEAL at 14:58

## 2017-01-01 RX ADMIN — RENAGEL 1600 MG: 800 TABLET ORAL at 20:03

## 2017-01-01 RX ADMIN — HEPARIN SODIUM 5000 UNITS: 5000 INJECTION, SOLUTION INTRAVENOUS; SUBCUTANEOUS at 19:44

## 2017-01-01 RX ADMIN — HYDROCODONE BITARTRATE AND ACETAMINOPHEN 1 TABLET: 10; 325 TABLET ORAL at 13:39

## 2017-01-01 RX ADMIN — SODIUM CHLORIDE, SODIUM LACTATE, CALCIUM CHLORIDE, MAGNESIUM CHLORIDE AND DEXTROSE 2000 ML: 1.5; 538; 448; 25.7; 5.08 INJECTION, SOLUTION INTRAPERITONEAL at 20:22

## 2017-01-01 RX ADMIN — HYDROCODONE BITARTRATE AND ACETAMINOPHEN 1 TABLET: 10; 325 TABLET ORAL at 00:04

## 2017-01-01 RX ADMIN — FAMOTIDINE 20 MG: 20 TABLET, FILM COATED ORAL at 08:09

## 2017-01-01 RX ADMIN — Medication 250 MG: at 05:53

## 2017-01-01 RX ADMIN — MIDODRINE HYDROCHLORIDE 5 MG: 5 TABLET ORAL at 17:39

## 2017-01-01 RX ADMIN — HYDROCODONE BITARTRATE AND ACETAMINOPHEN 1 TABLET: 10; 325 TABLET ORAL at 21:16

## 2017-01-01 RX ADMIN — FENTANYL CITRATE 25 MCG: 50 INJECTION, SOLUTION INTRAMUSCULAR; INTRAVENOUS at 11:39

## 2017-01-01 RX ADMIN — HYDROCODONE BITARTRATE AND ACETAMINOPHEN 1 TABLET: 7.5; 325 TABLET ORAL at 20:06

## 2017-01-01 RX ADMIN — PANTOPRAZOLE SODIUM 80 MG: 40 INJECTION, POWDER, FOR SOLUTION INTRAVENOUS at 15:22

## 2017-01-01 RX ADMIN — Medication 125 MG: at 17:22

## 2017-01-01 RX ADMIN — METOPROLOL TARTRATE 5 MG: 5 INJECTION INTRAVENOUS at 22:04

## 2017-01-01 RX ADMIN — DILTIAZEM HYDROCHLORIDE 60 MG: 60 TABLET, FILM COATED ORAL at 05:58

## 2017-01-01 RX ADMIN — Medication 250 MG: at 00:11

## 2017-01-01 RX ADMIN — HYDROCODONE BITARTRATE AND ACETAMINOPHEN 1 TABLET: 10; 325 TABLET ORAL at 14:12

## 2017-01-01 RX ADMIN — FENTANYL CITRATE 25 MCG: 50 INJECTION, SOLUTION INTRAMUSCULAR; INTRAVENOUS at 03:18

## 2017-01-01 RX ADMIN — SODIUM CHLORIDE, SODIUM LACTATE, CALCIUM CHLORIDE, MAGNESIUM CHLORIDE AND DEXTROSE 2000 ML: 1.5; 538; 448; 25.7; 5.08 INJECTION, SOLUTION INTRAPERITONEAL at 06:03

## 2017-01-01 RX ADMIN — RENAGEL 1600 MG: 800 TABLET ORAL at 08:53

## 2017-01-01 RX ADMIN — SODIUM CHLORIDE, SODIUM LACTATE, CALCIUM CHLORIDE, MAGNESIUM CHLORIDE AND DEXTROSE 2000 ML: 2.5; 538; 448; 18.3; 5.08 INJECTION, SOLUTION INTRAPERITONEAL at 15:19

## 2017-01-01 RX ADMIN — FENTANYL CITRATE 25 MCG: 50 INJECTION, SOLUTION INTRAMUSCULAR; INTRAVENOUS at 19:00

## 2017-01-01 RX ADMIN — HYDROCODONE BITARTRATE AND ACETAMINOPHEN 1 TABLET: 10; 325 TABLET ORAL at 03:24

## 2017-01-01 RX ADMIN — PROTHROMBIN, COAGULATION FACTOR VII HUMAN, COAGULATION FACTOR IX HUMAN, COAGULATION FACTOR X HUMAN, PROTEIN C, PROTEIN S HUMAN, AND WATER 3500 UNITS: KIT at 16:08

## 2017-01-01 RX ADMIN — DILTIAZEM HYDROCHLORIDE 60 MG: 60 TABLET, FILM COATED ORAL at 11:06

## 2017-01-01 RX ADMIN — FENTANYL CITRATE 25 MCG: 50 INJECTION, SOLUTION INTRAMUSCULAR; INTRAVENOUS at 22:08

## 2017-01-01 RX ADMIN — DAPTOMYCIN 900 MG: 500 INJECTION, POWDER, LYOPHILIZED, FOR SOLUTION INTRAVENOUS at 21:19

## 2017-01-01 RX ADMIN — Medication 250 MG: at 08:32

## 2017-01-01 RX ADMIN — SODIUM CHLORIDE, SODIUM LACTATE, CALCIUM CHLORIDE, MAGNESIUM CHLORIDE AND DEXTROSE 2000 ML: 1.5; 538; 448; 25.7; 5.08 INJECTION, SOLUTION INTRAPERITONEAL at 05:46

## 2017-01-01 RX ADMIN — MIDODRINE HYDROCHLORIDE 5 MG: 5 TABLET ORAL at 08:30

## 2017-01-01 RX ADMIN — Medication 250 MG: at 12:05

## 2017-01-01 RX ADMIN — SODIUM CHLORIDE, SODIUM LACTATE, CALCIUM CHLORIDE, MAGNESIUM CHLORIDE AND DEXTROSE 2500 ML: 2.5; 538; 448; 25.7; 5.08 INJECTION, SOLUTION INTRAPERITONEAL at 22:31

## 2017-01-01 RX ADMIN — Medication 125 MG: at 07:00

## 2017-01-01 RX ADMIN — SODIUM CHLORIDE, SODIUM LACTATE, CALCIUM CHLORIDE, MAGNESIUM CHLORIDE AND DEXTROSE 2000 ML: 1.5; 538; 448; 25.7; 5.08 INJECTION, SOLUTION INTRAPERITONEAL at 10:25

## 2017-01-01 RX ADMIN — Medication 5 MG: at 23:06

## 2017-01-01 RX ADMIN — HEPARIN SODIUM 5000 UNITS: 5000 INJECTION, SOLUTION INTRAVENOUS; SUBCUTANEOUS at 04:33

## 2017-01-01 RX ADMIN — HYDROCODONE BITARTRATE AND ACETAMINOPHEN 1 TABLET: 10; 325 TABLET ORAL at 04:33

## 2017-01-01 RX ADMIN — IPRATROPIUM BROMIDE AND ALBUTEROL SULFATE 3 ML: .5; 3 SOLUTION RESPIRATORY (INHALATION) at 07:58

## 2017-01-01 RX ADMIN — HYDROCODONE BITARTRATE AND ACETAMINOPHEN 1 TABLET: 10; 325 TABLET ORAL at 02:04

## 2017-01-01 RX ADMIN — SODIUM CHLORIDE, SODIUM LACTATE, CALCIUM CHLORIDE, MAGNESIUM CHLORIDE AND DEXTROSE 2000 ML: 4.25; 538; 448; 25.7; 5.08 INJECTION, SOLUTION INTRAPERITONEAL at 14:23

## 2017-01-01 RX ADMIN — METOPROLOL TARTRATE 50 MG: 50 TABLET ORAL at 08:30

## 2017-01-01 RX ADMIN — SODIUM CHLORIDE 8 MG/HR: 900 INJECTION INTRAVENOUS at 06:15

## 2017-01-01 RX ADMIN — RENAGEL 1600 MG: 800 TABLET ORAL at 12:21

## 2017-01-01 RX ADMIN — SODIUM CHLORIDE, SODIUM LACTATE, CALCIUM CHLORIDE, MAGNESIUM CHLORIDE AND DEXTROSE 2000 ML: 1.5; 538; 448; 25.7; 5.08 INJECTION, SOLUTION INTRAPERITONEAL at 19:02

## 2017-01-01 RX ADMIN — HYDROCODONE BITARTRATE AND ACETAMINOPHEN 1 TABLET: 10; 325 TABLET ORAL at 18:07

## 2017-01-01 RX ADMIN — SODIUM CHLORIDE 8 MG/HR: 900 INJECTION INTRAVENOUS at 02:05

## 2017-01-01 RX ADMIN — Medication 250 MG: at 05:51

## 2017-01-01 RX ADMIN — GLYCOPYRROLATE 0.2 MG: 0.2 INJECTION, SOLUTION INTRAMUSCULAR; INTRAVENOUS at 12:43

## 2017-01-01 RX ADMIN — HYDROCODONE BITARTRATE AND ACETAMINOPHEN 1 TABLET: 10; 325 TABLET ORAL at 13:09

## 2017-01-01 RX ADMIN — Medication 250 MG: at 08:19

## 2017-01-01 RX ADMIN — HYDROCODONE BITARTRATE AND ACETAMINOPHEN 1 TABLET: 10; 325 TABLET ORAL at 19:33

## 2017-01-01 RX ADMIN — Medication 250 MG: at 18:17

## 2017-01-01 RX ADMIN — METOPROLOL TARTRATE 50 MG: 50 TABLET ORAL at 19:43

## 2017-01-01 RX ADMIN — SODIUM CHLORIDE, SODIUM LACTATE, CALCIUM CHLORIDE, MAGNESIUM CHLORIDE AND DEXTROSE 2500 ML: 2.5; 538; 448; 25.7; 5.08 INJECTION, SOLUTION INTRAPERITONEAL at 17:36

## 2017-01-01 RX ADMIN — DILTIAZEM HYDROCHLORIDE 60 MG: 60 TABLET, FILM COATED ORAL at 18:03

## 2017-01-01 RX ADMIN — HYDROCODONE BITARTRATE AND ACETAMINOPHEN 1 TABLET: 10; 325 TABLET ORAL at 05:10

## 2017-01-01 RX ADMIN — SODIUM CHLORIDE, SODIUM LACTATE, CALCIUM CHLORIDE, MAGNESIUM CHLORIDE AND DEXTROSE 2000 ML: 2.5; 538; 448; 25.7; 5.08 INJECTION, SOLUTION INTRAPERITONEAL at 19:36

## 2017-01-01 RX ADMIN — Medication 125 MG: at 11:59

## 2017-01-01 RX ADMIN — Medication 250 MG: at 08:30

## 2017-01-01 RX ADMIN — HYDROCODONE BITARTRATE AND ACETAMINOPHEN 1 TABLET: 10; 325 TABLET ORAL at 11:06

## 2017-01-01 RX ADMIN — SODIUM CHLORIDE, SODIUM LACTATE, CALCIUM CHLORIDE, MAGNESIUM CHLORIDE AND DEXTROSE 2000 ML: 2.5; 538; 448; 25.7; 5.08 INJECTION, SOLUTION INTRAPERITONEAL at 08:18

## 2017-01-01 RX ADMIN — DILTIAZEM HYDROCHLORIDE 60 MG: 60 TABLET, FILM COATED ORAL at 05:53

## 2017-01-01 RX ADMIN — SODIUM CHLORIDE 500 ML: 9 INJECTION, SOLUTION INTRAVENOUS at 15:15

## 2017-01-01 RX ADMIN — Medication 2 TABLET: at 20:10

## 2017-01-01 RX ADMIN — SEVELAMER CARBONATE 1.6 G: 800 POWDER, FOR SUSPENSION ORAL at 11:40

## 2017-01-01 RX ADMIN — RENAGEL 1600 MG: 800 TABLET ORAL at 08:31

## 2017-01-01 RX ADMIN — SODIUM CHLORIDE, SODIUM LACTATE, CALCIUM CHLORIDE, MAGNESIUM CHLORIDE AND DEXTROSE 2000 ML: 1.5; 538; 448; 25.7; 5.08 INJECTION, SOLUTION INTRAPERITONEAL at 10:03

## 2017-01-01 RX ADMIN — METRONIDAZOLE 500 MG: 500 TABLET ORAL at 20:14

## 2017-01-01 RX ADMIN — Medication 250 MG: at 17:39

## 2017-01-01 RX ADMIN — HYDROCODONE BITARTRATE AND ACETAMINOPHEN 1 TABLET: 10; 325 TABLET ORAL at 05:53

## 2017-01-01 RX ADMIN — DILTIAZEM HYDROCHLORIDE 60 MG: 60 TABLET, FILM COATED ORAL at 18:05

## 2017-01-01 RX ADMIN — POTASSIUM CITRATE 20 MEQ: 10 TABLET ORAL at 04:27

## 2017-01-01 RX ADMIN — Medication 125 MG: at 11:32

## 2017-01-01 RX ADMIN — Medication 125 MG: at 11:40

## 2017-01-01 RX ADMIN — HYDROMORPHONE HYDROCHLORIDE 0.5 MG: 1 INJECTION, SOLUTION INTRAMUSCULAR; INTRAVENOUS; SUBCUTANEOUS at 15:19

## 2017-01-01 RX ADMIN — HYDROCODONE BITARTRATE AND ACETAMINOPHEN 1 TABLET: 10; 325 TABLET ORAL at 23:48

## 2017-01-01 RX ADMIN — Medication 250 MG: at 05:58

## 2017-01-01 RX ADMIN — HEPARIN SODIUM 5000 UNITS: 5000 INJECTION, SOLUTION INTRAVENOUS; SUBCUTANEOUS at 13:49

## 2017-01-01 RX ADMIN — HYDROCODONE BITARTRATE AND ACETAMINOPHEN 1 TABLET: 10; 325 TABLET ORAL at 09:00

## 2017-01-01 RX ADMIN — METOPROLOL TARTRATE 25 MG: 25 TABLET ORAL at 20:20

## 2017-01-01 RX ADMIN — SODIUM CHLORIDE, SODIUM LACTATE, CALCIUM CHLORIDE, MAGNESIUM CHLORIDE AND DEXTROSE 2000 ML: 2.5; 538; 448; 25.7; 5.08 INJECTION, SOLUTION INTRAPERITONEAL at 20:15

## 2017-01-01 RX ADMIN — HEPARIN SODIUM 5000 UNITS: 5000 INJECTION, SOLUTION INTRAVENOUS; SUBCUTANEOUS at 19:33

## 2017-01-01 RX ADMIN — METRONIDAZOLE 500 MG: 500 INJECTION, SOLUTION INTRAVENOUS at 01:57

## 2017-01-01 RX ADMIN — FAMOTIDINE 20 MG: 20 TABLET ORAL at 17:23

## 2017-01-01 RX ADMIN — DILTIAZEM HYDROCHLORIDE 60 MG: 60 TABLET, FILM COATED ORAL at 12:04

## 2017-01-01 RX ADMIN — SODIUM CHLORIDE, SODIUM LACTATE, CALCIUM CHLORIDE, MAGNESIUM CHLORIDE AND DEXTROSE 2000 ML: 2.5; 538; 448; 25.7; 5.08 INJECTION, SOLUTION INTRAPERITONEAL at 03:49

## 2017-01-01 RX ADMIN — Medication 250 MG: at 00:05

## 2017-01-01 RX ADMIN — CEFEPIME HYDROCHLORIDE 1 G: 1 INJECTION, POWDER, FOR SOLUTION INTRAMUSCULAR; INTRAVENOUS at 21:20

## 2017-01-01 RX ADMIN — SODIUM CHLORIDE, SODIUM LACTATE, CALCIUM CHLORIDE, MAGNESIUM CHLORIDE AND DEXTROSE 2000 ML: 1.5; 538; 448; 25.7; 5.08 INJECTION, SOLUTION INTRAPERITONEAL at 23:30

## 2017-01-01 RX ADMIN — HYDROCODONE BITARTRATE AND ACETAMINOPHEN 1 TABLET: 7.5; 325 TABLET ORAL at 10:28

## 2017-01-01 RX ADMIN — HYDROCODONE BITARTRATE AND ACETAMINOPHEN 1 TABLET: 10; 325 TABLET ORAL at 08:29

## 2017-01-01 RX ADMIN — DIGOXIN 250 MCG: 0.25 INJECTION INTRAMUSCULAR; INTRAVENOUS at 14:56

## 2017-01-01 RX ADMIN — IPRATROPIUM BROMIDE AND ALBUTEROL SULFATE 3 ML: .5; 3 SOLUTION RESPIRATORY (INHALATION) at 15:38

## 2017-01-01 RX ADMIN — FAMOTIDINE 20 MG: 20 TABLET ORAL at 08:32

## 2017-01-01 RX ADMIN — SODIUM CHLORIDE 250 ML: 9 INJECTION, SOLUTION INTRAVENOUS at 16:40

## 2017-01-01 RX ADMIN — Medication 250 MG: at 18:06

## 2017-01-01 RX ADMIN — SODIUM CHLORIDE, SODIUM LACTATE, CALCIUM CHLORIDE, MAGNESIUM CHLORIDE AND DEXTROSE 2000 ML: 2.5; 538; 448; 25.7; 5.08 INJECTION, SOLUTION INTRAPERITONEAL at 23:38

## 2017-01-01 RX ADMIN — RENAGEL 1600 MG: 800 TABLET ORAL at 17:39

## 2017-01-01 RX ADMIN — SODIUM CHLORIDE 8 MG/HR: 900 INJECTION INTRAVENOUS at 10:38

## 2017-01-01 RX ADMIN — Medication 125 MG: at 04:33

## 2017-01-01 RX ADMIN — Medication 125 MG: at 17:17

## 2017-01-01 RX ADMIN — FAMOTIDINE 20 MG: 20 TABLET ORAL at 17:17

## 2017-01-01 RX ADMIN — IPRATROPIUM BROMIDE AND ALBUTEROL SULFATE 3 ML: .5; 3 SOLUTION RESPIRATORY (INHALATION) at 20:03

## 2017-01-01 RX ADMIN — DILTIAZEM HYDROCHLORIDE 60 MG: 60 TABLET, FILM COATED ORAL at 17:12

## 2017-01-01 RX ADMIN — Medication 250 MG: at 14:55

## 2017-01-01 RX ADMIN — RENAGEL 1600 MG: 800 TABLET ORAL at 17:17

## 2017-01-01 RX ADMIN — FAMOTIDINE 20 MG: 20 TABLET, FILM COATED ORAL at 08:53

## 2017-01-01 RX ADMIN — Medication 125 MG: at 11:38

## 2017-01-01 RX ADMIN — HYDROCODONE BITARTRATE AND ACETAMINOPHEN 1 TABLET: 10; 325 TABLET ORAL at 11:20

## 2017-01-01 RX ADMIN — HYDROCODONE BITARTRATE AND ACETAMINOPHEN 1 TABLET: 10; 325 TABLET ORAL at 23:05

## 2017-01-01 RX ADMIN — RENAGEL 1600 MG: 800 TABLET ORAL at 11:06

## 2017-01-01 RX ADMIN — Medication 250 MG: at 23:52

## 2017-01-01 RX ADMIN — METOPROLOL TARTRATE 25 MG: 25 TABLET ORAL at 12:02

## 2017-01-01 RX ADMIN — RENAGEL 1600 MG: 800 TABLET ORAL at 11:39

## 2017-01-01 RX ADMIN — DILTIAZEM HYDROCHLORIDE 60 MG: 60 TABLET, FILM COATED ORAL at 11:47

## 2017-01-01 RX ADMIN — HYDROCODONE BITARTRATE AND ACETAMINOPHEN 1 TABLET: 10; 325 TABLET ORAL at 08:09

## 2017-01-01 RX ADMIN — SODIUM CHLORIDE, SODIUM LACTATE, CALCIUM CHLORIDE, MAGNESIUM CHLORIDE AND DEXTROSE 2000 ML: 2.5; 538; 448; 25.7; 5.08 INJECTION, SOLUTION INTRAPERITONEAL at 13:02

## 2017-01-01 RX ADMIN — IPRATROPIUM BROMIDE AND ALBUTEROL SULFATE 3 ML: .5; 3 SOLUTION RESPIRATORY (INHALATION) at 12:22

## 2017-01-01 RX ADMIN — METOPROLOL TARTRATE 5 MG: 5 INJECTION INTRAVENOUS at 03:54

## 2017-01-01 RX ADMIN — Medication 250 MG: at 11:47

## 2017-01-01 RX ADMIN — HYDROCODONE BITARTRATE AND ACETAMINOPHEN 1 TABLET: 10; 325 TABLET ORAL at 20:18

## 2017-01-01 RX ADMIN — Medication 250 MG: at 23:04

## 2017-01-01 RX ADMIN — RENAGEL 1600 MG: 800 TABLET ORAL at 08:09

## 2017-01-01 RX ADMIN — SODIUM CHLORIDE, SODIUM LACTATE, CALCIUM CHLORIDE, MAGNESIUM CHLORIDE AND DEXTROSE 2000 ML: 1.5; 538; 448; 25.7; 5.08 INJECTION, SOLUTION INTRAPERITONEAL at 22:16

## 2017-01-01 RX ADMIN — RENAGEL 1600 MG: 800 TABLET ORAL at 08:30

## 2017-01-01 RX ADMIN — HYDROCODONE BITARTRATE AND ACETAMINOPHEN 1 TABLET: 10; 325 TABLET ORAL at 20:45

## 2017-01-01 RX ADMIN — FENTANYL CITRATE 25 MCG: 50 INJECTION, SOLUTION INTRAMUSCULAR; INTRAVENOUS at 13:08

## 2017-01-01 RX ADMIN — Medication 250 MG: at 17:17

## 2017-01-01 RX ADMIN — SEVELAMER CARBONATE 1.6 G: 800 POWDER, FOR SUSPENSION ORAL at 08:19

## 2017-01-01 RX ADMIN — DILTIAZEM HYDROCHLORIDE 60 MG: 60 TABLET, FILM COATED ORAL at 00:11

## 2017-01-01 RX ADMIN — HEPARIN SODIUM 5000 UNITS: 5000 INJECTION, SOLUTION INTRAVENOUS; SUBCUTANEOUS at 20:21

## 2017-01-01 RX ADMIN — HYDROCODONE BITARTRATE AND ACETAMINOPHEN 1 TABLET: 10; 325 TABLET ORAL at 03:47

## 2017-01-01 RX ADMIN — IPRATROPIUM BROMIDE AND ALBUTEROL SULFATE 3 ML: .5; 3 SOLUTION RESPIRATORY (INHALATION) at 07:31

## 2017-01-01 RX ADMIN — Medication 250 MG: at 12:04

## 2017-01-01 RX ADMIN — SODIUM CHLORIDE, SODIUM LACTATE, CALCIUM CHLORIDE, MAGNESIUM CHLORIDE AND DEXTROSE 2000 ML: 1.5; 538; 448; 25.7; 5.08 INJECTION, SOLUTION INTRAPERITONEAL at 18:08

## 2017-01-01 RX ADMIN — HYDROCODONE BITARTRATE AND ACETAMINOPHEN 1 TABLET: 10; 325 TABLET ORAL at 06:04

## 2017-01-01 RX ADMIN — SODIUM CHLORIDE, SODIUM LACTATE, CALCIUM CHLORIDE, MAGNESIUM CHLORIDE AND DEXTROSE 2000 ML: 2.5; 538; 448; 25.7; 5.08 INJECTION, SOLUTION INTRAPERITONEAL at 12:06

## 2017-01-01 RX ADMIN — HYDROCODONE BITARTRATE AND ACETAMINOPHEN 1 TABLET: 7.5; 325 TABLET ORAL at 05:57

## 2017-01-01 RX ADMIN — SODIUM CHLORIDE, SODIUM LACTATE, CALCIUM CHLORIDE, MAGNESIUM CHLORIDE AND DEXTROSE 2000 ML: 2.5; 538; 448; 25.7; 5.08 INJECTION, SOLUTION INTRAPERITONEAL at 10:30

## 2017-01-01 RX ADMIN — FENTANYL CITRATE 25 MCG: 50 INJECTION, SOLUTION INTRAMUSCULAR; INTRAVENOUS at 23:42

## 2017-01-01 RX ADMIN — SODIUM CHLORIDE 1000 ML: 9 INJECTION, SOLUTION INTRAVENOUS at 14:11

## 2017-01-01 RX ADMIN — SODIUM CHLORIDE 8 MG/HR: 900 INJECTION INTRAVENOUS at 22:24

## 2017-01-01 RX ADMIN — HYDROCODONE BITARTRATE AND ACETAMINOPHEN 1 TABLET: 10; 325 TABLET ORAL at 12:50

## 2017-01-01 RX ADMIN — Medication 250 MG: at 17:32

## 2017-01-01 RX ADMIN — Medication 250 MG: at 01:10

## 2017-01-01 RX ADMIN — SODIUM CHLORIDE, SODIUM LACTATE, CALCIUM CHLORIDE, MAGNESIUM CHLORIDE AND DEXTROSE 2000 ML: 1.5; 538; 448; 25.7; 5.08 INJECTION, SOLUTION INTRAPERITONEAL at 14:41

## 2017-01-01 RX ADMIN — PANTOPRAZOLE SODIUM 40 MG: 40 TABLET, DELAYED RELEASE ORAL at 05:57

## 2017-01-01 RX ADMIN — METOPROLOL TARTRATE 50 MG: 50 TABLET ORAL at 08:31

## 2017-01-01 RX ADMIN — RENAGEL 1600 MG: 800 TABLET ORAL at 17:22

## 2017-01-01 RX ADMIN — SODIUM CHLORIDE, POTASSIUM CHLORIDE, SODIUM LACTATE AND CALCIUM CHLORIDE 2000 ML: 600; 310; 30; 20 INJECTION, SOLUTION INTRAVENOUS at 18:01

## 2017-01-01 RX ADMIN — SODIUM CHLORIDE 8 MG/HR: 900 INJECTION INTRAVENOUS at 15:57

## 2017-01-01 RX ADMIN — MAGNESIUM SULFATE HEPTAHYDRATE 2 G: 40 INJECTION, SOLUTION INTRAVENOUS at 04:27

## 2017-01-01 RX ADMIN — HYDROCODONE BITARTRATE AND ACETAMINOPHEN 1 TABLET: 10; 325 TABLET ORAL at 16:48

## 2017-01-01 RX ADMIN — DILTIAZEM HYDROCHLORIDE 60 MG: 60 TABLET, FILM COATED ORAL at 05:48

## 2017-01-01 RX ADMIN — Medication 250 MG: at 12:21

## 2017-01-01 RX ADMIN — HYDROCODONE BITARTRATE AND ACETAMINOPHEN 1 TABLET: 10; 325 TABLET ORAL at 15:39

## 2017-01-01 RX ADMIN — Medication 125 MG: at 17:36

## 2017-01-01 RX ADMIN — RENAGEL 1600 MG: 800 TABLET ORAL at 11:32

## 2017-01-01 RX ADMIN — IPRATROPIUM BROMIDE AND ALBUTEROL SULFATE 3 ML: .5; 3 SOLUTION RESPIRATORY (INHALATION) at 07:57

## 2017-01-01 RX ADMIN — SODIUM CHLORIDE, SODIUM LACTATE, CALCIUM CHLORIDE, MAGNESIUM CHLORIDE AND DEXTROSE 2000 ML: 1.5; 538; 448; 25.7; 5.08 INJECTION, SOLUTION INTRAPERITONEAL at 06:01

## 2017-01-01 RX ADMIN — HEPARIN SODIUM 5000 UNITS: 5000 INJECTION, SOLUTION INTRAVENOUS; SUBCUTANEOUS at 05:10

## 2017-01-01 RX ADMIN — SODIUM CHLORIDE, SODIUM LACTATE, CALCIUM CHLORIDE, MAGNESIUM CHLORIDE AND DEXTROSE 2500 ML: 2.5; 538; 448; 25.7; 5.08 INJECTION, SOLUTION INTRAPERITONEAL at 07:00

## 2017-01-01 RX ADMIN — Medication 250 MG: at 05:57

## 2017-01-01 RX ADMIN — FAMOTIDINE 20 MG: 20 TABLET ORAL at 17:38

## 2017-01-01 RX ADMIN — HYDROCODONE BITARTRATE AND ACETAMINOPHEN 1 TABLET: 10; 325 TABLET ORAL at 23:52

## 2017-01-01 RX ADMIN — HEPARIN SODIUM 5000 UNITS: 5000 INJECTION, SOLUTION INTRAVENOUS; SUBCUTANEOUS at 05:37

## 2017-01-01 RX ADMIN — HYDROCODONE BITARTRATE AND ACETAMINOPHEN 1 TABLET: 10; 325 TABLET ORAL at 21:54

## 2017-01-01 RX ADMIN — DILTIAZEM HYDROCHLORIDE 60 MG: 60 TABLET, FILM COATED ORAL at 00:04

## 2017-01-01 RX ADMIN — FAMOTIDINE 20 MG: 20 TABLET ORAL at 08:19

## 2017-01-01 RX ADMIN — FAMOTIDINE 20 MG: 20 TABLET, FILM COATED ORAL at 09:00

## 2017-01-01 RX ADMIN — DILTIAZEM HYDROCHLORIDE 60 MG: 60 TABLET, FILM COATED ORAL at 23:25

## 2017-01-01 RX ADMIN — HYDROCODONE BITARTRATE AND ACETAMINOPHEN 1 TABLET: 5; 325 TABLET ORAL at 17:05

## 2017-01-01 RX ADMIN — HYDROCODONE BITARTRATE AND ACETAMINOPHEN 1 TABLET: 10; 325 TABLET ORAL at 22:20

## 2017-01-01 RX ADMIN — HYDROCODONE BITARTRATE AND ACETAMINOPHEN 1 TABLET: 7.5; 325 TABLET ORAL at 15:48

## 2017-01-01 RX ADMIN — RENAGEL 1600 MG: 800 TABLET ORAL at 08:25

## 2017-01-01 RX ADMIN — METOPROLOL TARTRATE 25 MG: 25 TABLET ORAL at 10:27

## 2017-01-01 RX ADMIN — Medication 125 MG: at 00:55

## 2017-01-01 RX ADMIN — IPRATROPIUM BROMIDE AND ALBUTEROL SULFATE 3 ML: .5; 3 SOLUTION RESPIRATORY (INHALATION) at 15:35

## 2017-01-01 RX ADMIN — ALBUMIN HUMAN 12.5 G: 0.25 SOLUTION INTRAVENOUS at 17:31

## 2017-01-01 RX ADMIN — HYDROCODONE BITARTRATE AND ACETAMINOPHEN 1 TABLET: 10; 325 TABLET ORAL at 18:06

## 2017-01-01 RX ADMIN — RENAGEL 1600 MG: 800 TABLET ORAL at 17:12

## 2017-01-01 RX ADMIN — Medication 250 MG: at 17:23

## 2017-01-01 RX ADMIN — Medication 250 MG: at 18:03

## 2017-01-01 RX ADMIN — RENAGEL 1600 MG: 800 TABLET ORAL at 12:04

## 2017-01-01 RX ADMIN — DILTIAZEM HYDROCHLORIDE 60 MG: 60 TABLET, FILM COATED ORAL at 05:51

## 2017-01-01 RX ADMIN — Medication 2 TABLET: at 20:03

## 2017-01-01 RX ADMIN — DILTIAZEM HYDROCHLORIDE 60 MG: 60 TABLET, FILM COATED ORAL at 12:05

## 2017-01-01 RX ADMIN — PHYTONADIONE 10 MG: 10 INJECTION, EMULSION INTRAMUSCULAR; INTRAVENOUS; SUBCUTANEOUS at 16:34

## 2017-01-01 RX ADMIN — Medication 125 MG: at 23:04

## 2017-01-01 RX ADMIN — Medication 250 MG: at 17:12

## 2017-01-01 RX ADMIN — IPRATROPIUM BROMIDE AND ALBUTEROL SULFATE 3 ML: .5; 3 SOLUTION RESPIRATORY (INHALATION) at 20:29

## 2017-01-01 RX ADMIN — DILTIAZEM HYDROCHLORIDE 60 MG: 60 TABLET, FILM COATED ORAL at 17:32

## 2017-01-01 RX ADMIN — METOPROLOL TARTRATE 50 MG: 50 TABLET ORAL at 19:33

## 2017-01-01 RX ADMIN — HYDROCODONE BITARTRATE AND ACETAMINOPHEN 1 TABLET: 10; 325 TABLET ORAL at 14:27

## 2017-01-01 RX ADMIN — HYDROCODONE BITARTRATE AND ACETAMINOPHEN 1 TABLET: 10; 325 TABLET ORAL at 03:56

## 2017-01-01 RX ADMIN — SODIUM CHLORIDE, SODIUM LACTATE, CALCIUM CHLORIDE, MAGNESIUM CHLORIDE AND DEXTROSE 2000 ML: 2.5; 538; 448; 25.7; 5.08 INJECTION, SOLUTION INTRAPERITONEAL at 06:02

## 2017-01-01 RX ADMIN — HEPARIN SODIUM 5000 UNITS: 5000 INJECTION, SOLUTION INTRAVENOUS; SUBCUTANEOUS at 07:00

## 2017-01-01 RX ADMIN — IPRATROPIUM BROMIDE AND ALBUTEROL SULFATE 3 ML: .5; 3 SOLUTION RESPIRATORY (INHALATION) at 19:50

## 2017-01-01 RX ADMIN — SODIUM CHLORIDE, SODIUM LACTATE, CALCIUM CHLORIDE, MAGNESIUM CHLORIDE AND DEXTROSE 2000 ML: 1.5; 538; 448; 25.7; 5.08 INJECTION, SOLUTION INTRAPERITONEAL at 14:25

## 2017-01-01 RX ADMIN — IPRATROPIUM BROMIDE AND ALBUTEROL SULFATE 3 ML: .5; 3 SOLUTION RESPIRATORY (INHALATION) at 13:07

## 2017-01-01 RX ADMIN — HYDROCODONE BITARTRATE AND ACETAMINOPHEN 1 TABLET: 10; 325 TABLET ORAL at 08:19

## 2017-01-01 RX ADMIN — DILTIAZEM HYDROCHLORIDE 60 MG: 60 TABLET, FILM COATED ORAL at 23:52

## 2017-01-01 RX ADMIN — Medication 250 MG: at 23:25

## 2017-01-01 RX ADMIN — RENAGEL 1600 MG: 800 TABLET ORAL at 11:07

## 2017-01-01 RX ADMIN — IPRATROPIUM BROMIDE AND ALBUTEROL SULFATE 3 ML: .5; 3 SOLUTION RESPIRATORY (INHALATION) at 16:43

## 2017-01-01 RX ADMIN — METOPROLOL TARTRATE 25 MG: 25 TABLET ORAL at 08:33

## 2017-01-01 RX ADMIN — DIGOXIN 250 MCG: 0.25 INJECTION INTRAMUSCULAR; INTRAVENOUS at 14:16

## 2017-01-01 RX ADMIN — Medication 125 MG: at 23:37

## 2017-01-01 RX ADMIN — Medication 125 MG: at 05:12

## 2017-01-01 RX ADMIN — RENAGEL 1600 MG: 800 TABLET ORAL at 17:32

## 2017-01-01 RX ADMIN — HYDROCODONE BITARTRATE AND ACETAMINOPHEN 1 TABLET: 10; 325 TABLET ORAL at 03:58

## 2017-01-01 RX ADMIN — DILTIAZEM HYDROCHLORIDE 60 MG: 60 TABLET, FILM COATED ORAL at 12:22

## 2017-01-01 RX ADMIN — METRONIDAZOLE 500 MG: 500 INJECTION, SOLUTION INTRAVENOUS at 20:16

## 2017-01-01 RX ADMIN — Medication 250 MG: at 08:31

## 2017-01-01 RX ADMIN — HEPARIN SODIUM 5000 UNITS: 5000 INJECTION, SOLUTION INTRAVENOUS; SUBCUTANEOUS at 13:35

## 2017-01-01 RX ADMIN — Medication 250 MG: at 18:20

## 2017-01-01 RX ADMIN — HYDROCODONE BITARTRATE AND ACETAMINOPHEN 1 TABLET: 10; 325 TABLET ORAL at 10:00

## 2017-01-01 RX ADMIN — HYDROCODONE BITARTRATE AND ACETAMINOPHEN 1 TABLET: 10; 325 TABLET ORAL at 20:03

## 2017-01-01 RX ADMIN — ONDANSETRON 4 MG: 2 INJECTION INTRAMUSCULAR; INTRAVENOUS at 19:04

## 2017-01-01 RX ADMIN — HYDROCODONE BITARTRATE AND ACETAMINOPHEN 1 TABLET: 10; 325 TABLET ORAL at 17:59

## 2017-01-01 RX ADMIN — Medication 125 MG: at 23:14

## 2017-01-01 RX ADMIN — SODIUM CHLORIDE, SODIUM LACTATE, CALCIUM CHLORIDE, MAGNESIUM CHLORIDE AND DEXTROSE 2000 ML: 1.5; 538; 448; 25.7; 5.08 INJECTION, SOLUTION INTRAPERITONEAL at 22:09

## 2017-01-01 RX ADMIN — RENAGEL 1600 MG: 800 TABLET ORAL at 09:00

## 2017-01-01 RX ADMIN — RENAGEL 1600 MG: 800 TABLET ORAL at 18:17

## 2017-01-01 RX ADMIN — RENAGEL 1600 MG: 800 TABLET ORAL at 11:59

## 2017-01-01 RX ADMIN — SODIUM CHLORIDE, SODIUM LACTATE, CALCIUM CHLORIDE, MAGNESIUM CHLORIDE AND DEXTROSE 2000 ML: 2.5; 538; 448; 25.7; 5.08 INJECTION, SOLUTION INTRAPERITONEAL at 00:56

## 2017-01-01 RX ADMIN — HYDROCODONE BITARTRATE AND ACETAMINOPHEN 1 TABLET: 10; 325 TABLET ORAL at 18:29

## 2017-01-01 RX ADMIN — Medication 125 MG: at 05:37

## 2017-01-01 RX ADMIN — Medication 250 MG: at 11:07

## 2017-01-01 RX ADMIN — HEPARIN SODIUM 5000 UNITS: 5000 INJECTION, SOLUTION INTRAVENOUS; SUBCUTANEOUS at 23:04

## 2017-01-03 ENCOUNTER — OFFICE VISIT (OUTPATIENT)
Dept: PAIN MEDICINE | Facility: CLINIC | Age: 62
End: 2017-01-03

## 2017-01-03 VITALS
HEART RATE: 88 BPM | BODY MASS INDEX: 44.1 KG/M2 | RESPIRATION RATE: 18 BRPM | DIASTOLIC BLOOD PRESSURE: 83 MMHG | WEIGHT: 315 LBS | SYSTOLIC BLOOD PRESSURE: 120 MMHG | HEIGHT: 71 IN | TEMPERATURE: 97.2 F | OXYGEN SATURATION: 100 %

## 2017-01-03 DIAGNOSIS — E66.01 MORBID OBESITY DUE TO EXCESS CALORIES (HCC): ICD-10-CM

## 2017-01-03 DIAGNOSIS — N18.9 CHRONIC RENAL FAILURE, UNSPECIFIED STAGE: ICD-10-CM

## 2017-01-03 DIAGNOSIS — M48.062 LUMBAR STENOSIS WITH NEUROGENIC CLAUDICATION: ICD-10-CM

## 2017-01-03 DIAGNOSIS — F17.210 CIGARETTE NICOTINE DEPENDENCE WITHOUT COMPLICATION: Primary | ICD-10-CM

## 2017-01-03 DIAGNOSIS — R53.81 PHYSICAL DECONDITIONING: ICD-10-CM

## 2017-01-03 DIAGNOSIS — F17.210 CIGARETTE NICOTINE DEPENDENCE WITHOUT COMPLICATION: ICD-10-CM

## 2017-01-03 DIAGNOSIS — M47.816 LUMBAR FACET ARTHROPATHY: ICD-10-CM

## 2017-01-03 DIAGNOSIS — M17.0 PRIMARY OSTEOARTHRITIS OF BOTH KNEES: ICD-10-CM

## 2017-01-03 DIAGNOSIS — Z99.2 DEPENDENCE ON PERITONEAL DIALYSIS (HCC): ICD-10-CM

## 2017-01-03 PROCEDURE — 95972 ALYS CPLX SP/PN NPGT W/PRGRM: CPT | Performed by: ANESTHESIOLOGY

## 2017-01-03 PROCEDURE — 99213 OFFICE O/P EST LOW 20 MIN: CPT | Performed by: ANESTHESIOLOGY

## 2017-01-03 RX ORDER — CINACALCET HYDROCHLORIDE 30 MG/1
30 TABLET, COATED ORAL SEE ADMIN INSTRUCTIONS
COMMUNITY
Start: 2016-12-20 | End: 2017-01-01 | Stop reason: HOSPADM

## 2017-01-03 RX ORDER — OFLOXACIN 3 MG/ML
SOLUTION/ DROPS OPHTHALMIC
COMMUNITY
Start: 2016-11-29 | End: 2017-01-01

## 2017-01-03 RX ORDER — EVOLOCUMAB 140 MG/ML
INJECTION, SOLUTION SUBCUTANEOUS
COMMUNITY
Start: 2016-12-30 | End: 2017-01-01

## 2017-01-03 NOTE — MR AVS SNAPSHOT
Justin Schultzharvey Fleming   1/3/2017 11:30 AM   Office Visit    Dept Phone:  [phone]   Encounter #:  [id number]    Provider:  Santiago Cedeno MD   Department:  [facility] PAIN MANAGEMENT                Your Full Care Plan              Today's Medication Changes          These changes are accurate as of: 1/3/17  1:06 PM.  If you have any questions, ask your nurse or doctor.               Medication(s)that have changed:     HYDROcodone-acetaminophen  MG per tablet   Commonly known as:  NORCO   Take 1 tablet by mouth Every 4 (Four) Hours As Needed for moderate pain (4-6).   What changed:  Another medication with the same name was removed. Continue taking this medication, and follow the directions you see here.                  Your Updated Medication List          This list is accurate as of: 1/3/17  1:06 PM.  Always use your most recent med list.                ANORO ELLIPTA 62.5-25 MCG/INH aerosol powder    Generic drug:  Umeclidinium-Vilanterol       aspirin 81 MG tablet       CALCITRIOL PO       carvedilol 3.125 MG tablet   Commonly known as:  COREG       Cholecalciferol 2000 UNITS capsule       colchicine 0.6 MG tablet       FISH OIL CONCENTRATE 1000 MG capsule       gentamicin 0.1 % ointment   Commonly known as:  GARAMYCIN       HYDROcodone-acetaminophen  MG per tablet   Commonly known as:  NORCO   Take 1 tablet by mouth Every 4 (Four) Hours As Needed for moderate pain (4-6).       INCRUSE ELLIPTA 62.5 MCG/INH aerosol powder    Generic drug:  Umeclidinium Bromide       indomethacin 50 MG capsule   Commonly known as:  INDOCIN       multivitamin tablet tablet       NITROSTAT 0.4 MG SL tablet   Generic drug:  nitroglycerin       ofloxacin 0.3 % ophthalmic solution   Commonly known as:  OCUFLOX       potassium chloride 20 MEQ CR tablet   Commonly known as:  K-DUR,KLOR-CON       RENVELA 800 MG tablet   Generic drug:  sevelamer       * REPATHA SURECLICK 140 MG/ML  solution auto-injector   Generic drug:  Evolocumab       * REPATHA SURECLICK SC       SENSIPAR 30 MG tablet   Generic drug:  cinacalcet       * Notice:  This list has 2 medication(s) that are the same as other medications prescribed for you. Read the directions carefully, and ask your doctor or other care provider to review them with you.            We Performed the Following     SCANNED - TELEMETRY         You Were Diagnosed With        Codes Comments    Lumbar stenosis with neurogenic claudication     ICD-10-CM: M48.06  ICD-9-CM: 724.03     Lumbar facet arthropathy     ICD-10-CM: M12.88  ICD-9-CM: 721.3     Primary osteoarthritis of both knees     ICD-10-CM: M17.0  ICD-9-CM: 715.16     Morbid obesity due to excess calories     ICD-10-CM: E66.01  ICD-9-CM: 278.01     Chronic renal failure, unspecified stage     ICD-10-CM: N18.9  ICD-9-CM: 585.9     Dependence on peritoneal dialysis     ICD-10-CM: Z99.2  ICD-9-CM: V45.11     Cigarette nicotine dependence without complication     ICD-10-CM: F17.210  ICD-9-CM: 305.1     Physical deconditioning     ICD-10-CM: R53.81  ICD-9-CM: 799.3       Instructions     None    Patient Instructions History      Upcoming Appointments     Visit Type Date Time Department    OFFICE VISIT 1/3/2017 11:30 AM [facility]    POST-OP 2017  1:00 PM [facility]    OFFICE VISIT 2017  1:30 PM [facility]      MyChart Signup     [employer] allows you to send messages to your doctor, view your test results, renew your prescriptions, schedule appointments, and more. To sign up, go to [employer] and click on the Sign Up Now link in the New User? box. Enter your [employer] Activation Code exactly as it appears below along with the last four digits of your Social Security Number and your Date of Birth () to complete the sign-up process. If you do not sign up before the expiration date, you must request a new code.    [employer] Activation Code:  [email or web address]  Expires: 1/5/2017  8:30 AM    If you have questions, you can email [email or web address] or call [phone] to talk to our MyChart staff. Remember, [employer] is NOT to be used for urgent needs. For medical emergencies, dial 911.               Other Info from Your Visit           Your Appointments     Jan 06, 2017  1:00 PM EST   (Arrive by 12:30 PM EST)   Post-Op with Radha Olivo PA-C   [facility] (--)    [address],  [address]  [facility] [id number] [phone]            Jan 31, 2017  1:30 PM EST   Office Visit with Santiago Cedeno MD   [facility] (--)    [address],  [address]  [facility] [id number] [phone]           Arrive 15 minutes prior to appointment.              Allergies     Vancomycin Allergy Rash    Corticosteroids      PATIENT REPORTS VISION PROBLEMS AFTER STEROID INJECTIONS IN HIS BACK       Reason for Visit     Follow-up     Back Pain           Vital Signs     Blood Pressure Pulse Temperature Respirations Height Weight    120/83 (BP Location: Left arm, Patient Position: Sitting) 88 97.2 °F (36.2 °C) (Oral) 18 71\" (180.3 cm) 346 lb (157 kg)    Oxygen Saturation Body Mass Index Smoking Status             100% 48.26 kg/m2 Current Every Day Smoker         Problems and Diagnoses Noted     Chronic kidney failure    Nicotine dependence    Dependence on peritoneal dialysis    Joint disorder    Lumbar stenosis with neurogenic claudication    Severe obesity    Physical deconditioning    Primary osteoarthritis of both knees      Results     SCANNED - TELEMETRY                 "

## 2017-01-03 NOTE — LETTER
"January 3, 2017     Merry Thompson MD  [address]  [address]  [facility] [address]    Patient: Justin Ruvalcaba Jr.   YOB: 1955   Date of Visit: 1/3/2017       Dear Dr. Jay MD:    Thank you for referring Justin Ruvalcaba to me for evaluation. Below are the relevant portions of my assessment and plan of care.    If you have questions, please do not hesitate to call me. I look forward to following Justin along with you.         Sincerely,        Santiago Cedeno MD        CC: MD Isidro Pack MD Virginia Wachs Apro, PA-C Luis A. Vascello, MD  1/3/2017  1:03 PM  Signed  Chief Complaint: \"I am doing well with the stimulator. I still have some pain from the surgery.\"    Brief History: Mr. Justin Ruvalcaba Jr. is a 61 y.o. male, who underwent implantation of a spinal cord stimulator device with Dr. Vo on 12/23/2017 with [employer] Penta paddle lead (MRI compatible)  with the top electrodes projecting at the level of the superior endplate of the T7 vertebral level. IPG: St. Arnie Proclaim 7 IPG nonrechargeable (MRI compatible full body). Patient has remained afebrile and surgical wounds are well healed and without erythema, drainage, or fluid accumulation.    Patient returns to the clinic for evaluation of chronic lower back pain and possible spinal cord stimulator reprogramming. Mr. Justin Ruvalcaba Jr. reports at least 70% relief along with remarkable functional improvement with the use of his stimulator device. Patient reports significant relief of his previously severe neurogenic claudication.   Pain level is rated as 10/10 with the stimulator \"turned off”, which decreases to 1/10 when the stimulator device is \"turned on”. Patient level ranges from 1/10 to 2/10 with the use of the spinal cord stimulator device.  Patient denies   numbness and weakness in the lower extremities, patient denies  any new bladder or bowel problems.   In terms of current analgesics, " "Justin Ruvalcaba Jr. takes: Hydrocodone for post-surgical pain.  I have reviewed [facility] Report #[id number] consistent to medication reconciliation.    Review of New Diagnostic Studies:  There are no new diagnostic studies for review    Previous Diagnostic Studies:   MRI Lumbar Spine without contrast on 09/22/2015 revealed, at L3-L4, extensive posterior element hypertrophy and broad based disc protrusion which causes canal stenosis. AP diameter is 9 mm. Foramina appear well maintained. At L4-L5 and L5-S1, canal and foramina appear normal.    X-ray of the bilateral knees on 09/22/2015 revealed cystic degeneration with osteochondral lesions in the central left proximal tibial plateau and in the right tibial plateau and intercondylar notch. Arthropathy is seen extensively in both knees, most severe in the anterior compartments of the right knee. Cystic degeneration is seen along the lateral right distal femoral condyle.       The following portions of the patient's history were reviewed and updated as appropriate: problem list, past medical history, past surgery history, social history, family history, medications, and allergies    Review of Systems   Respiratory: Positive for apnea.    Musculoskeletal: Positive for arthralgias, back pain and gait problem.   Neurological: Positive for numbness.   All other systems reviewed and are negative.    Visit Vitals   • /83 (BP Location: Left arm, Patient Position: Sitting)   • Pulse 88   • Temp 97.2 °F (36.2 °C) (Oral)   • Resp 18   • Ht 71\" (180.3 cm)   • Wt (!) 346 lb (157 kg)   • SpO2 100%   • BMI 48.26 kg/m2      Physical Exam   Neurologic Exam  Constitutional General appearance: Abnormal. Appears healthy, morbidly obese, well hydrated and appearance reflects stated age.    Head and face: Normal.    Palpation of the face and sinuses: No sinus tenderness.    Eyes Conjunctiva and lids: No erythema, swelling or discharge. Pupils: Equal, round, reactive to light.    Neck: " Supple, symmetric, trachea midline, no masses.    Pulmonary Respiratory effort: No increased work of breathing or signs of respiratory distress. Auscultation of lungs: Clear to auscultation.    Cardiovascular Auscultation of heart: Normal rate and rhythm, normal S1 and S2, no murmurs. Peripheral vascular exam: Normal. Examination of extremities for edema and/or varicosities: Abnormal.    Abdomen: Non-tender, no masses. The abdomen was obese. Bowel sounds were normal. The abdomen was soft and nontender. No masses palpated. Presence of a peritoneal dialysis catheter.    Musculoskeletal    Skin: dressings intact. No fluid accumulation, drainage, or erythema.  Gait and station: Normal. Gait evaluation demonstrated a normal gait. The range of motion of the lumbar spine is level and without pain. Lumbar facet joint loading maneuvers are negative. Merrick and Gaenslen's tests are negative. The range of motion of the hip joints is essentially full and without pain. The range of motion of both knees is minor 5 degrees of extension, 100 degrees of flexion. There is significant crepitus upon mobilization of both knees.    Muscle strength/tone: Normal. Motor Strength Findings: normal strength. Motor Tone: normal tone. Involuntary movements: none.    Neurologic    Cranial nerves: Cranial nerves 2-12 intact.    Cortical function: Normal mental status.    Reflexes: 2+ and symmetric. Deep tendon reflexes: 0 right patella, 0 left patella, 0 right ankle jerk and 0 left ankle jerk2+ right biceps and 2+ left biceps. Superficial/Primitive Reflexes: primitive reflexes were absent. Straight leg raising test negative.    Sensation: No sensory loss. Sensory exam: intact to light touch, intact pain and temperature sensation, intact vibration sensation and normal proprioception.    Coordination: Normal finger to nose and heel to shin. Coordination: normal balance and negative Romberg's sign.    Psychiatric    Judgment and insight: Normal.     Orientation to person, place and time: Normal.    Recent and remote memory: Intact.    Mood and affect: Normal.    PROCEDURE: Analysis of the spinal cord stimulator device with complex spinal cord stimulator reprogramming   Patient used the Saint Jude spinal cord stimulator device for 288 hours lifetime hours (average 24 hours per day).   Analysis of impedence reveals normal impedence for all contacts.  The spinal cord stimulator device was reprogrammed under my direct supervision and one program was created by adjusting electrode polarities, amplitude, pulse width, and pulse rate, in the following fashion;  Program ONE   Electrode polarities: 7-, 8-, 9+, 10-  Amplitude: 0.5 mA (range: 0.5-1.5 mA)  Pulse width: 1000 mcs  Rate: 40 Hz  IntraBurst rate: 500 Hz  Micro-dosin:25   Patient experienced significant pain relief with coverage in all areas of chronic pain.  Walking ability test performed revealing significant improvement of severe neurogenic claudication.  Time spent reprogramming: 10 minutes  A copy of the telemetry report will be scanned in the patient's chart.    ASSESSMENT:   1. Lumbar stenosis with neurogenic claudication    2. Lumbar facet arthropathy    3. Primary osteoarthritis of both knees    4. Morbid obesity due to excess calories    5. Chronic renal failure, unspecified stage    6. Dependence on peritoneal dialysis    7. Cigarette nicotine dependence without complication    8. Physical deconditioning      PLAN: Patient's chronic pain condition is significantly improved since implantation of his SCS device. Therefore, I have proposed the following plan:  1. Follow-up in four weeks for possible reprogramming   2. Long-term rehabilitation efforts:  a. Patient will start a comprehensive physical therapy program for reconditioning, water therapy, therapeutic exercise, core strengthening, gait and balance training, neurodynamics, ultrasound, ASTYM, E-STIM, myofascial release, and dry needling in  4-6 weeks  b. Smoking cessation: I have facilitated the following referrals:  · Referral to [facility]  · Referral to Dr. Steele for individual therapy for smoking cessation  c. Referral to [facility]  d. Patient will start an independent exercise program in 4-6 weeks  3. The patient and his family have been instructed to contact my office with any questions or difficulties. The patient understands the plan and agrees to proceed accordingly.    Patient Care Team:  Merry Thompson MD as PCP - General (Family Medicine)  Merry Thompson MD as Consulting Physician (Family Medicine)  Chon Vo MD as Surgeon (Neurosurgery)  Isidro Gomez MD as Consulting Physician (Cardiology)  Radha Olivo PA-C as Physician Assistant (Physician Assistant)  Santiago Cedeno MD as Consulting Physician (Anesthesiology)     No orders of the defined types were placed in this encounter.        Future Appointments  Date Time Provider Department Center   1/6/2017 1:00 PM Radha Olivo PA-C [facility] None         Santiago Cedeno MD    EMR Dragon/Transcription disclaimer:  Much of this encounter note is an electronic transcription of spoken language to printed text. Electronic transcription of spoken language may permit erroneous, or at times, nonsensical words or phrases to be inadvertently transcribed. Although I have reviewed the note for such errors, some may still exist.

## 2017-01-03 NOTE — PROGRESS NOTES
"Chief Complaint: \"I am doing well with the stimulator. I still have some pain from the surgery.\"    Brief History: Mr. Justin Ruvalcaba Jr. is a 61 y.o. male, who underwent implantation of a spinal cord stimulator device with Dr. Vo on 12/23/2017 with [employer] St. Arnie Penta paddle lead (MRI compatible)  with the top electrodes projecting at the level of the superior endplate of the T7 vertebral level. IPG: St. Arnie Proclaim 7 IPG nonrechargeable (MRI compatible full body). Patient has remained afebrile and surgical wounds are well healed and without erythema, drainage, or fluid accumulation.    Patient returns to the clinic for evaluation of chronic lower back pain and possible spinal cord stimulator reprogramming. Mr. Justin Ruvalcaba Jr. reports at least 70% relief along with remarkable functional improvement with the use of his stimulator device. Patient reports significant relief of his previously severe neurogenic claudication.   Pain level is rated as 10/10 with the stimulator \"turned off”, which decreases to 1/10 when the stimulator device is \"turned on”. Patient level ranges from 1/10 to 2/10 with the use of the spinal cord stimulator device.  Patient denies   numbness and weakness in the lower extremities, patient denies  any new bladder or bowel problems.   In terms of current analgesics, Justin Ruvalcaba Jr. takes: Hydrocodone for post-surgical pain.  I have reviewed [facility] Report #[id number] consistent to medication reconciliation.    Review of New Diagnostic Studies:  There are no new diagnostic studies for review    Previous Diagnostic Studies:   MRI Lumbar Spine without contrast on 09/22/2015 revealed, at L3-L4, extensive posterior element hypertrophy and broad based disc protrusion which causes canal stenosis. AP diameter is 9 mm. Foramina appear well maintained. At L4-L5 and L5-S1, canal and foramina appear normal.    X-ray of the bilateral knees on 09/22/2015 revealed cystic degeneration with " "osteochondral lesions in the central left proximal tibial plateau and in the right tibial plateau and intercondylar notch. Arthropathy is seen extensively in both knees, most severe in the anterior compartments of the right knee. Cystic degeneration is seen along the lateral right distal femoral condyle.       The following portions of the patient's history were reviewed and updated as appropriate: problem list, past medical history, past surgery history, social history, family history, medications, and allergies    Review of Systems   Respiratory: Positive for apnea.    Musculoskeletal: Positive for arthralgias, back pain and gait problem.   Neurological: Positive for numbness.   All other systems reviewed and are negative.    Visit Vitals   • /83 (BP Location: Left arm, Patient Position: Sitting)   • Pulse 88   • Temp 97.2 °F (36.2 °C) (Oral)   • Resp 18   • Ht 71\" (180.3 cm)   • Wt (!) 346 lb (157 kg)   • SpO2 100%   • BMI 48.26 kg/m2      Physical Exam   Neurologic Exam  Constitutional General appearance: Abnormal. Appears healthy, morbidly obese, well hydrated and appearance reflects stated age.    Head and face: Normal.    Palpation of the face and sinuses: No sinus tenderness.    Eyes Conjunctiva and lids: No erythema, swelling or discharge. Pupils: Equal, round, reactive to light.    Neck: Supple, symmetric, trachea midline, no masses.    Pulmonary Respiratory effort: No increased work of breathing or signs of respiratory distress. Auscultation of lungs: Clear to auscultation.    Cardiovascular Auscultation of heart: Normal rate and rhythm, normal S1 and S2, no murmurs. Peripheral vascular exam: Normal. Examination of extremities for edema and/or varicosities: Abnormal.    Abdomen: Non-tender, no masses. The abdomen was obese. Bowel sounds were normal. The abdomen was soft and nontender. No masses palpated. Presence of a peritoneal dialysis catheter.    Musculoskeletal    Skin: dressings intact. No " fluid accumulation, drainage, or erythema.  Gait and station: Normal. Gait evaluation demonstrated a normal gait. The range of motion of the lumbar spine is level and without pain. Lumbar facet joint loading maneuvers are negative. Merrick and Gaenslen's tests are negative. The range of motion of the hip joints is essentially full and without pain. The range of motion of both knees is minor 5 degrees of extension, 100 degrees of flexion. There is significant crepitus upon mobilization of both knees.    Muscle strength/tone: Normal. Motor Strength Findings: normal strength. Motor Tone: normal tone. Involuntary movements: none.    Neurologic    Cranial nerves: Cranial nerves 2-12 intact.    Cortical function: Normal mental status.    Reflexes: 2+ and symmetric. Deep tendon reflexes: 0 right patella, 0 left patella, 0 right ankle jerk and 0 left ankle jerk2+ right biceps and 2+ left biceps. Superficial/Primitive Reflexes: primitive reflexes were absent. Straight leg raising test negative.    Sensation: No sensory loss. Sensory exam: intact to light touch, intact pain and temperature sensation, intact vibration sensation and normal proprioception.    Coordination: Normal finger to nose and heel to shin. Coordination: normal balance and negative Romberg's sign.    Psychiatric    Judgment and insight: Normal.    Orientation to person, place and time: Normal.    Recent and remote memory: Intact.    Mood and affect: Normal.    PROCEDURE: Analysis of the spinal cord stimulator device with complex spinal cord stimulator reprogramming   Patient used the Saint Jude spinal cord stimulator device for 288 hours lifetime hours (average 24 hours per day).   Analysis of impedence reveals normal impedence for all contacts.  The spinal cord stimulator device was reprogrammed under my direct supervision and one program was created by adjusting electrode polarities, amplitude, pulse width, and pulse rate, in the following  fashion;  Program ONE   Electrode polarities: 7-, 8-, 9+, 10-  Amplitude: 0.5 mA (range: 0.5-1.5 mA)  Pulse width: 1000 mcs  Rate: 40 Hz  IntraBurst rate: 500 Hz  Micro-dosin:25   Patient experienced significant pain relief with coverage in all areas of chronic pain.  Walking ability test performed revealing significant improvement of severe neurogenic claudication.  Time spent reprogramming: 10 minutes  A copy of the telemetry report will be scanned in the patient's chart.    ASSESSMENT:   1. Lumbar stenosis with neurogenic claudication    2. Lumbar facet arthropathy    3. Primary osteoarthritis of both knees    4. Morbid obesity due to excess calories    5. Chronic renal failure, unspecified stage    6. Dependence on peritoneal dialysis    7. Cigarette nicotine dependence without complication    8. Physical deconditioning      PLAN: Patient's chronic pain condition is significantly improved since implantation of his SCS device. Therefore, I have proposed the following plan:  1. Follow-up in four weeks for possible reprogramming   2. Long-term rehabilitation efforts:  a. Patient will start a comprehensive physical therapy program for reconditioning, water therapy, therapeutic exercise, core strengthening, gait and balance training, neurodynamics, ultrasound, ASTYM, E-STIM, myofascial release, and dry needling in 4-6 weeks  b. Smoking cessation: I have facilitated the following referrals:  · Referral to [facility]  · Referral to Dr. Steele for individual therapy for smoking cessation  c. Referral to [facility]  d. Patient will start an independent exercise program in 4-6 weeks  3. The patient and his family have been instructed to contact my office with any questions or difficulties. The patient understands the plan and agrees to proceed accordingly.    Patient Care Team:  Merry Thompson MD as PCP - General (Family Medicine)  Merry Thompson MD  as Consulting Physician (Family Medicine)  Chon Vo MD as Surgeon (Neurosurgery)  Isidro Gomez MD as Consulting Physician (Cardiology)  Radha Olivo PA-C as Physician Assistant (Physician Assistant)  Santiago Cedeno MD as Consulting Physician (Anesthesiology)     No orders of the defined types were placed in this encounter.        Future Appointments  Date Time Provider Department Center   1/6/2017 1:00 PM Radha Olivo PA-C [facility] None         Santiago Cedeno MD    EMR Dragon/Transcription disclaimer:  Much of this encounter note is an electronic transcription of spoken language to printed text. Electronic transcription of spoken language may permit erroneous, or at times, nonsensical words or phrases to be inadvertently transcribed. Although I have reviewed the note for such errors, some may still exist.

## 2017-01-06 ENCOUNTER — OFFICE VISIT (OUTPATIENT)
Dept: NEUROSURGERY | Facility: CLINIC | Age: 62
End: 2017-01-06

## 2017-01-06 VITALS — BODY MASS INDEX: 42.66 KG/M2 | HEIGHT: 72 IN | TEMPERATURE: 97 F | WEIGHT: 315 LBS

## 2017-01-06 DIAGNOSIS — M48.062 SPINAL STENOSIS, LUMBAR REGION, WITH NEUROGENIC CLAUDICATION: Primary | ICD-10-CM

## 2017-01-06 PROCEDURE — 99024 POSTOP FOLLOW-UP VISIT: CPT | Performed by: PHYSICIAN ASSISTANT

## 2017-01-06 NOTE — MR AVS SNAPSHOT
Justin Ruvalcaba    1/6/2017 1:00 PM   Office Visit    Dept Phone:  [phone]   Encounter #:  [id number]    Provider:  Radha Olivo PA-C   Department:  [facility]                Your Full Care Plan              Your Updated Medication List          This list is accurate as of: 1/6/17  1:42 PM.  Always use your most recent med list.                ANORO ELLIPTA 62.5-25 MCG/INH aerosol powder    Generic drug:  Umeclidinium-Vilanterol       aspirin 81 MG tablet       CALCITRIOL PO       carvedilol 3.125 MG tablet   Commonly known as:  COREG       Cholecalciferol 2000 UNITS capsule       colchicine 0.6 MG tablet       FISH OIL CONCENTRATE 1000 MG capsule       gentamicin 0.1 % ointment   Commonly known as:  GARAMYCIN       HYDROcodone-acetaminophen  MG per tablet   Commonly known as:  NORCO   Take 1 tablet by mouth Every 4 (Four) Hours As Needed for moderate pain (4-6).       INCRUSE ELLIPTA 62.5 MCG/INH aerosol powder    Generic drug:  Umeclidinium Bromide       indomethacin 50 MG capsule   Commonly known as:  INDOCIN       multivitamin tablet tablet       NITROSTAT 0.4 MG SL tablet   Generic drug:  nitroglycerin       ofloxacin 0.3 % ophthalmic solution   Commonly known as:  OCUFLOX       potassium chloride 20 MEQ CR tablet   Commonly known as:  K-DUR,KLOR-CON       RENVELA 800 MG tablet   Generic drug:  sevelamer       * REPATHA SURECLICK 140 MG/ML solution auto-injector   Generic drug:  Evolocumab       * REPATHA SURECLICK SC       SENSIPAR 30 MG tablet   Generic drug:  cinacalcet       * Notice:  This list has 2 medication(s) that are the same as other medications prescribed for you. Read the directions carefully, and ask your doctor or other care provider to review them with you.            You Were Diagnosed With        Codes Comments    Spinal stenosis, lumbar region, with neurogenic claudication    -  Primary ICD-10-CM:  "M48.06  ICD-9-CM: 724.03       Instructions     None    Patient Instructions History      Upcoming Appointments     Visit Type Date Time Department    POST-OP 1/6/2017  1:00 PM [facility]    OFFICE VISIT 1/31/2017  1:30 PM [facility]      MyChart Signup     Our records indicate that you have declined [facility] MyChart signup. If you would like to sign up for [employer], please email [email or web address] or call [phone] to obtain an activation code.             Other Info from Your Visit           Your Appointments     Jan 31, 2017  1:30 PM EST   Office Visit with Santiago Cedeno MD   [facility] (--)    [address] ,  [address] [address] [phone]           Arrive 15 minutes prior to appointment.              Allergies     Vancomycin Allergy Rash    Corticosteroids      PATIENT REPORTS VISION PROBLEMS AFTER STEROID INJECTIONS IN HIS BACK       Reason for Visit     Post-op           Vital Signs     Temperature Height Weight Body Mass Index Smoking Status       97 °F (36.1 °C) 72\" (182.9 cm) 348 lb (158 kg) 47.2 kg/m2 Current Every Day Smoker       Problems and Diagnoses Noted     Narrowing of spinal canal    -  Primary        "

## 2017-01-06 NOTE — PROGRESS NOTES
Patient: Justin Ruvalcaba Jr.  : 1955  Chart #: [id number]    Date of Service: 2017    CHIEF COMPLAINT: chronic low back pain    History of Present Illness Mr. Ruvalcaba is a 61-year-old gentleman with a history of chronic low back pain that has been unremitting despite numerous treatment measures.  A recent trial of epidural spinal cord stimulator provided substantial relief.  As such on 2016 he underwent T9 laminotomy for placement of [employer] Arnie epidural spinal cord stimulator.  Surgery was without overt intraoperative complication.    Today Mr. Ruvalcaba is 2-1/2 weeks postop.  He is still a bit sore at the incision sites but otherwise is doing well.  He saw Dr. Cedeno this week to have his device programmed.  He says prior to surgery he was unable to stand or walk for even short periods.  This has dramatically improved.    The following portions of the patient's history were reviewed and updated as appropriate: allergies, current medications, past family history, past medical history, past social history, past surgical history and problem list.    Review of Systems   Constitutional: Negative for activity change, appetite change, chills, diaphoresis, fatigue, fever and unexpected weight change.   HENT: Positive for congestion. Negative for dental problem, drooling, ear discharge, ear pain, facial swelling, hearing loss, mouth sores, nosebleeds, postnasal drip, rhinorrhea, sinus pressure, sneezing, sore throat, tinnitus, trouble swallowing and voice change.    Eyes: Positive for photophobia. Negative for pain, discharge, redness, itching and visual disturbance.   Respiratory: Positive for apnea and wheezing. Negative for cough, choking, chest tightness, shortness of breath and stridor.    Cardiovascular: Negative for chest pain, palpitations and leg swelling.   Gastrointestinal: Negative for abdominal distention, abdominal pain, anal bleeding, blood in stool, constipation, diarrhea, nausea, rectal  "pain and vomiting.   Musculoskeletal: Positive for arthralgias and back pain. Negative for gait problem, joint swelling, myalgias, neck pain and neck stiffness.   Skin: Negative for color change, pallor, rash and wound.   Allergic/Immunologic: Negative for environmental allergies, food allergies and immunocompromised state.   Neurological: Negative for dizziness, tremors, seizures, syncope, facial asymmetry, speech difficulty, weakness, light-headedness, numbness and headaches.   Hematological: Negative for adenopathy. Does not bruise/bleed easily.   Psychiatric/Behavioral: Negative for agitation, behavioral problems, confusion, decreased concentration, dysphoric mood, self-injury, sleep disturbance and suicidal ideas. The patient is not nervous/anxious and is not hyperactive.        Objective   Vital Signs: Temperature 97 °F (36.1 °C), height 72\" (182.9 cm), weight (!) 348 lb (158 kg).  Physical Exam   Musculoskeletal:   Ambulates with the assistance of a cane.   Skin:   Mid back and flank incisions were examined.  I cleaned and removed the Steri-Strips.  Incisions are intact without warmth erythema or induration.  No pain with palpation.   Nursing note and vitals reviewed.      Assessment/Plan   Medical Decision Making: Mr. Ruvalcaba is 2-1/2 weeks status post placement of St. Arnie epidural spinal cord stimulator.  His incisions have healed nicely.  It will take some time for the patient to get used to the placement of the device.  Once things scarred down it will be more comfortable.  He already met with the [employer] Arnie rep earlier this week for programming.  He has follow-up with Dr. Cedeno in a few weeks.  We will see him on an as-needed basis.             Radha Olivo PA-C  Patient Care Team:  Merry Thompson MD as PCP - General (Family Medicine)  Merry Thompson MD as Consulting Physician (Family Medicine)  Chon Vo MD as Surgeon (Neurosurgery)  Isidro Gomez MD as Consulting Physician " (Cardiology)  Radha Olivo PA-C as Physician Assistant (Physician Assistant)  Santiago Cedeno MD as Consulting Physician (Anesthesiology)

## 2017-04-10 ENCOUNTER — OFFICE VISIT (OUTPATIENT)
Dept: PAIN MEDICINE | Facility: CLINIC | Age: 62
End: 2017-04-10

## 2017-04-10 VITALS
OXYGEN SATURATION: 99 % | DIASTOLIC BLOOD PRESSURE: 87 MMHG | WEIGHT: 315 LBS | BODY MASS INDEX: 42.66 KG/M2 | HEART RATE: 115 BPM | TEMPERATURE: 97 F | HEIGHT: 72 IN | SYSTOLIC BLOOD PRESSURE: 122 MMHG

## 2017-04-10 DIAGNOSIS — E66.01 MORBID OBESITY DUE TO EXCESS CALORIES (HCC): Primary | ICD-10-CM

## 2017-04-10 DIAGNOSIS — N18.5 CHRONIC RENAL FAILURE, STAGE 5 (HCC): ICD-10-CM

## 2017-04-10 DIAGNOSIS — M48.062 LUMBAR STENOSIS WITH NEUROGENIC CLAUDICATION: ICD-10-CM

## 2017-04-10 DIAGNOSIS — M47.816 LUMBAR FACET ARTHROPATHY: ICD-10-CM

## 2017-04-10 DIAGNOSIS — Z99.2 DEPENDENCE ON PERITONEAL DIALYSIS (HCC): ICD-10-CM

## 2017-04-10 DIAGNOSIS — F17.210 CIGARETTE NICOTINE DEPENDENCE WITHOUT COMPLICATION: ICD-10-CM

## 2017-04-10 DIAGNOSIS — R53.81 PHYSICAL DECONDITIONING: ICD-10-CM

## 2017-04-10 DIAGNOSIS — E66.01 MORBID OBESITY DUE TO EXCESS CALORIES (HCC): ICD-10-CM

## 2017-04-10 DIAGNOSIS — M17.0 PRIMARY OSTEOARTHRITIS OF BOTH KNEES: ICD-10-CM

## 2017-04-10 PROCEDURE — 99213 OFFICE O/P EST LOW 20 MIN: CPT | Performed by: ANESTHESIOLOGY

## 2017-04-10 PROCEDURE — 95972 ALYS CPLX SP/PN NPGT W/PRGRM: CPT | Performed by: ANESTHESIOLOGY

## 2017-04-10 RX ORDER — VIT B COMP NO.3/FOLIC/C/BIOTIN 1 MG-60 MG
TABLET ORAL
COMMUNITY
Start: 2017-01-25 | End: 2017-01-01

## 2017-04-10 RX ORDER — GABAPENTIN 300 MG/1
CAPSULE ORAL
COMMUNITY
Start: 2017-03-24 | End: 2017-01-01

## 2017-04-10 NOTE — PROGRESS NOTES
"Chief Complaint: \"I am doing very well with the stimulator until the last 3 weeks.\"    Brief History: Mr. Justin Ruvalcaba Jr. is a 61 y.o. male, who underwent implantation of a spinal cord stimulator device with Dr. Vo on 12/23/2017 with [employer] Arnie Penta paddle lead (MRI compatible)  with the top electrodes projecting at the level of the superior endplate of the T7 vertebral level. IPG: St. Arnie Proclaim 7 IPG nonrechargeable (MRI compatible full body).   Patient returns to the clinic for evaluation of his chronic lower back pain and possible spinal cord stimulator reprogramming. Mr. Justin Ruvalcaba Jr. reports at least 50% relief along with remarkable functional improvement with the use of his stimulator device. Patient reports significant relief of his previously severe neurogenic claudication.   Pain level is rated as 0/10 (sitting) 5/10 (after standing for too long) with the use of his stimulator device.   Patient level ranges from 0/10 to 5/10 with the use of the spinal cord stimulator device.  Patient has been able to tolerate walking for 5-10 minutes, before his implant he tolerated walking for less than 2 minutes.  Patient denies pain, numbness or weakness in the lower extremities. Patient denies  any new bladder or bowel problems.   In terms of current analgesics, Justin Ruvalcaba Jr. takes: Hydrocodone for post-surgical pain.  I have reviewed [facility] Report #[id number] consistent to medication reconciliation.    Diagnostic Studies:   MRI Lumbar Spine without contrast on 09/22/2015 revealed, at L3-L4, extensive posterior element hypertrophy and broad based disc protrusion which causes canal stenosis. AP diameter is 9 mm. Foramina appear well maintained. At L4-L5 and L5-S1, canal and foramina appear normal.    X-ray of the bilateral knees on 09/22/2015 revealed cystic degeneration with osteochondral lesions in the central left proximal tibial plateau and in the right tibial plateau and intercondylar " "notch. Arthropathy is seen extensively in both knees, most severe in the anterior compartments of the right knee. Cystic degeneration is seen along the lateral right distal femoral condyle.       The following portions of the patient's history were reviewed and updated as appropriate: problem list, past medical history, past surgery history, social history, family history, medications, and allergies    Review of Systems   Respiratory: Positive for apnea.    Musculoskeletal: Positive for arthralgias and back pain.   All other systems reviewed and are negative.    /87 (BP Location: Right arm)  Pulse 115  Temp 97 °F (36.1 °C) (Temporal Artery )   Ht 72\" (182.9 cm)  Wt (!) 347 lb (157 kg)  SpO2 99%  BMI 47.06 kg/m2     Physical Exam   Neurologic Exam  Constitutional General appearance: Abnormal. Appears healthy, morbidly obese, well hydrated and appearance reflects stated age.    Head and face: Normal.    Palpation of the face and sinuses: No sinus tenderness.    Eyes Conjunctiva and lids: No erythema, swelling or discharge. Pupils: Equal, round, reactive to light.    Neck: Supple, symmetric, trachea midline, no masses.    Pulmonary Respiratory effort: No increased work of breathing or signs of respiratory distress. Auscultation of lungs: Clear to auscultation.    Cardiovascular Auscultation of heart: Normal rate and rhythm, normal S1 and S2, no murmurs. Peripheral vascular exam: Normal. Examination of extremities for edema and/or varicosities: Abnormal.    Abdomen: Non-tender, no masses. The abdomen was obese. Bowel sounds were normal. The abdomen was soft and nontender. No masses palpated. Presence of a peritoneal dialysis catheter.    Musculoskeletal    Skin: dressings intact. No fluid accumulation, drainage, or erythema.  Gait and station: Normal. Gait evaluation demonstrated a normal gait. The range of motion of the lumbar spine is level and without pain. Lumbar facet joint loading maneuvers are " negative. Merrick and Gaenslen's tests are negative. The range of motion of the hip joints is essentially full and without pain. The range of motion of both knees is minor 5 degrees of extension, 100 degrees of flexion. There is significant crepitus upon mobilization of both knees.  Muscle strength/tone: Normal. Motor Strength Findings: normal strength. Motor Tone: normal tone. Involuntary movements: none.    Neurologic    Cranial nerves: Cranial nerves 2-12 intact.    Cortical function: Normal mental status.    Reflexes: 2+ and symmetric. Deep tendon reflexes: 0 right patella, 0 left patella, 0 right ankle jerk and 0 left ankle jerk2+ right biceps and 2+ left biceps. Superficial/Primitive Reflexes: primitive reflexes were absent. Straight leg raising test negative.    Sensation: No sensory loss. Sensory exam: intact to light touch, intact pain and temperature sensation, intact vibration sensation and normal proprioception.    Coordination: Normal finger to nose and heel to shin. Coordination: normal balance and negative Romberg's sign.    Psychiatric    Judgment and insight: Normal.    Orientation to person, place and time: Normal.    Recent and remote memory: Intact.    Mood and affect: Normal.    PROCEDURE: Analysis of the spinal cord stimulator device with complex spinal cord stimulator reprogramming   Patient used the [employer] spinal cord stimulator device for 2328 hours since last visit, and 2616 hours lifetime hours (average 24 hours per day).   Analysis of impedence reveals normal impedence for all contacts.The spinal cord stimulator device was reprogrammed under my direct supervision and one program was created by adjusting electrode polarities, amplitude, pulse width, pulse rate, Burst DR, in the following fashion;  Program ONE   Electrode polarities: 3-, 5+, 10-, 11-, 13+  Amplitude: 0.8-1.5 mA)  Pulse width: 1000 mcs  Rate: 40 Hz  IntraBurst rate: 500 Hz  Micro-dosin:25   Patient experienced  significant pain relief with coverage in all areas of chronic pain.  Walking ability test performed revealing significant improvement of severe neurogenic claudication.  Time spent reprogramming: 15 minutes  A copy of the telemetry report will be scanned in the patient's chart.    ASSESSMENT:   1. Lumbar stenosis with neurogenic claudication    2. Lumbar facet arthropathy    3. Primary osteoarthritis of both knees    4. Morbid obesity due to excess calories    5. Chronic renal failure, stage 5    6. Dependence on peritoneal dialysis    7. Cigarette nicotine dependence without complication    8. Physical deconditioning       PLAN: Patient's chronic pain condition is significantly improved since implantation of his SCS device. Therefore, I have proposed the following plan:  1. Follow-up in eight weeks for possible SCS reprogramming   2. Long-term rehabilitation efforts:  a. Continue comprehensive physical therapy program for reconditioning, water therapy, therapeutic exercise, core strengthening, gait and balance training, neurodynamics, ultrasound, ASTYM, E-STIM, myofascial release, and dry needling   b. Smoking cessation: I have facilitated the following referrals:  · Referral to [facility]  · Referral to Dr. Steele for individual therapy for smoking cessation  c. Referral to [facility]  d. Start independent exercise program  3. The patient and his family have been instructed to contact my office with any questions or difficulties. The patient understands the plan and agrees to proceed accordingly.    Patient Care Team:  Merry Thompson MD as PCP - General (Family Medicine)  Merry Thompson MD as Consulting Physician (Family Medicine)  Chon Vo MD as Surgeon (Neurosurgery)  Isidro Gomez MD as Consulting Physician (Cardiology)  Radha Olivo PA-C as Physician Assistant (Physician Assistant)  Santiago Cedeno MD as Consulting  Physician (Anesthesiology)     No orders of the defined types were placed in this encounter.        No future appointments.      Santiago Cedeno MD    EMR Dragon/Transcription disclaimer:  Much of this encounter note is an electronic transcription of spoken language to printed text. Electronic transcription of spoken language may permit erroneous, or at times, nonsensical words or phrases to be inadvertently transcribed. Although I have reviewed the note for such errors, some may still exist.

## 2017-06-21 ENCOUNTER — TRANSCRIBE ORDERS (OUTPATIENT)
Dept: ADMINISTRATIVE | Facility: HOSPITAL | Age: 62
End: 2017-06-21

## 2017-06-21 DIAGNOSIS — N28.89 KIDNEY MASS: Primary | ICD-10-CM

## 2017-06-26 ENCOUNTER — HOSPITAL ENCOUNTER (OUTPATIENT)
Dept: CT IMAGING | Facility: HOSPITAL | Age: 62
Discharge: HOME | End: 2017-06-26
Attending: INTERNAL MEDICINE | Admitting: INTERNAL MEDICINE

## 2017-06-26 DIAGNOSIS — N28.89 KIDNEY MASS: ICD-10-CM

## 2017-06-26 PROCEDURE — 74160 CT ABDOMEN W/CONTRAST: CPT

## 2017-06-26 PROCEDURE — 0 IOPAMIDOL PER 1 ML: Performed by: INTERNAL MEDICINE

## 2017-06-26 PROCEDURE — 82565 ASSAY OF CREATININE: CPT

## 2017-06-26 RX ADMIN — IOPAMIDOL 98 ML: 755 INJECTION, SOLUTION INTRAVENOUS at 13:55

## 2017-06-27 LAB — CREAT BLDA-MCNC: 11.9 MG/DL (ref 0.6–1.3)

## 2017-07-24 ENCOUNTER — TRANSCRIBE ORDERS (OUTPATIENT)
Dept: ADMINISTRATIVE | Facility: HOSPITAL | Age: 62
End: 2017-07-24

## 2017-07-24 DIAGNOSIS — G56.02 CARPAL TUNNEL SYNDROME, LEFT: ICD-10-CM

## 2017-07-24 DIAGNOSIS — G56.00 CARPAL TUNNEL SYNDROME, UNSPECIFIED LATERALITY: Primary | ICD-10-CM

## 2017-08-25 NOTE — ED PROVIDER NOTES
"Subjective   HPI Comments: Justin Ruvalcaba Jr. is a 62 y.o.male with history of HTN, atrial fibrillation, dialysis, and coronary stents who presents to the ED with complaints of diarrhea with onset 6 days ago. He reports that his diarrhea has been relieved intermittently with Pepto Bismol for two days but has been worsening since three days ago which prompted his visit to the ED. He also notes bright red blood in his stool from his hemorrhoids and N/V. He states that he is on peritoneal dialysis 8 and half hours everyday. He also complains of diaphoresis but denies coughing, SOA, chills, or any other complaints at this time.    Patient is a 62 y.o. male presenting with diarrhea.   History provided by:  Patient  Diarrhea   The primary symptoms include nausea, vomiting and diarrhea. The illness began 6 to 7 days ago. The onset was sudden. The problem has not changed since onset.  The illness does not include chills. Significant associated medical issues include hemorrhoids.       Review of Systems   Constitutional: Positive for diaphoresis. Negative for chills.   Respiratory: Negative for cough and shortness of breath.    Gastrointestinal: Positive for blood in stool, diarrhea, nausea and vomiting.       Past Medical History:   Diagnosis Date   • Anemia    • Arthritis    • Back pain    • Chronic kidney disease    • Coronary artery disease     ONE CORONARY STENT PLACED 11/2015   • CPAP (continuous positive airway pressure) dependence    • Hypertension     CONTROLLED WITH MEDS PER PT    • Patient on peritoneal dialysis     8 HOURS AT HS    • Sleep apnea    • Wears dentures     FULL PLATE ON TOP PARTIAL PLATE ON BOTTOM    • Wears glasses        Allergies   Allergen Reactions   • Vancomycin Rash   • Corticosteroids      PATIENT REPORTS VISION PROBLEMS AFTER STEROID INJECTIONS IN HIS BACK        Past Surgical History:   Procedure Laterality Date   • ARTERIOVENOUS FISTULA      LUE-FOR DIALYSIS PATIENT STATES \"I NEVER USED IT " "SO IT CLOSED UP\"    • BACK SURGERY     • CARDIAC CATHETERIZATION  11/2015    ONE CORONARY STENT PLACED    • COLONOSCOPY  2013   • INSERTION PERITONEAL DIALYSIS CATHETER     • PARATHYROIDECTOMY     • SPINAL CORD STIMULATOR IMPLANT N/A 12/22/2016    Procedure: SPINAL CORD STIMULATOR INSERTION;  Surgeon: Chon Vo MD;  Location: [facility];  Service:        Family History   Problem Relation Age of Onset   • Hypertension Mother    • Hypertension Father    • Stroke Father    • Diabetes Sister    • Hypertension Sister        Social History     Social History   • Marital status:      Spouse name: N/A   • Number of children: N/A   • Years of education: N/A     Social History Main Topics   • Smoking status: Current Every Day Smoker     Packs/day: 3.00     Types: Cigars   • Smokeless tobacco: Never Used      Comment: 3 SMALL CIGARS PER DAY --STARTED CIGARS 2 YEARS AGO, STOPPED SMOKING CIGARETTES 10 YEARS   • Alcohol use Yes      Comment: RARE PER PT    • Drug use: No   • Sexual activity: Defer     Other Topics Concern   • None     Social History Narrative         Objective   Physical Exam   Constitutional: He is oriented to person, place, and time. He appears well-developed and well-nourished. No distress.   HENT:   Head: Normocephalic and atraumatic.   Nose: Nose normal.   Pharynx is benign. Airway Patent. Mouth is dry.   Eyes: Conjunctivae are normal. No scleral icterus.   Neck: Normal range of motion. Neck supple.   Cardiovascular: Normal rate, regular rhythm and normal heart sounds.    No murmur heard.  Pulmonary/Chest: Effort normal. No respiratory distress.   Abdominal: Soft. He exhibits no mass. There is no tenderness. There is no rebound and no guarding.   CAPD catheter in RUQ.   Musculoskeletal: Normal range of motion. He exhibits edema (Mild pre tibia edema).   Neurological: He is alert and oriented to person, place, and time.   Skin: Skin is warm and dry.   Psychiatric: He has a normal mood and affect. " His behavior is normal.   Nursing note and vitals reviewed.      Procedures         ED Course  ED Course   Comment By Time   Dr. Forte spoke to , Nephrology and his recommendations will be reflected in the discharge instructions. Lisa Prieto 08/25 1935       Recent Results (from the past 24 hour(s))   Lavender Top    Collection Time: 08/25/17  3:52 PM   Result Value Ref Range    Extra Tube hold for add-on    CBC Auto Differential    Collection Time: 08/25/17  3:52 PM   Result Value Ref Range    WBC 8.46 3.50 - 10.80 10*3/mm3    RBC 3.80 (L) 4.20 - 5.76 10*6/mm3    Hemoglobin 12.8 (L) 13.1 - 17.5 g/dL    Hematocrit 38.3 (L) 38.9 - 50.9 %    .8 (H) 80.0 - 99.0 fL    MCH 33.7 (H) 27.0 - 31.0 pg    MCHC 33.4 32.0 - 36.0 g/dL    RDW 17.1 (H) 11.3 - 14.5 %    RDW-SD 62.1 (H) 37.0 - 54.0 fl    MPV 11.3 6.0 - 12.0 fL    Platelets 174 150 - 450 10*3/mm3    Neutrophil % 67.5 41.0 - 71.0 %    Lymphocyte % 17.3 (L) 24.0 - 44.0 %    Monocyte % 11.5 0.0 - 12.0 %    Eosinophil % 2.5 0.0 - 3.0 %    Basophil % 0.4 0.0 - 1.0 %    Immature Grans % 0.8 (H) 0.0 - 0.6 %    Neutrophils, Absolute 5.72 1.50 - 8.30 10*3/mm3    Lymphocytes, Absolute 1.46 0.60 - 4.80 10*3/mm3    Monocytes, Absolute 0.97 0.00 - 1.00 10*3/mm3    Eosinophils, Absolute 0.21 0.00 - 0.30 10*3/mm3    Basophils, Absolute 0.03 0.00 - 0.20 10*3/mm3    Immature Grans, Absolute 0.07 (H) 0.00 - 0.03 10*3/mm3   POC CHEM 8    Collection Time: 08/25/17  3:52 PM   Result Value Ref Range    Glucose 105 70 - 130 mg/dL    BUN, Arterial 68 (H) 8 - 26 mg/dL    Creatinine 12.20 (H) 0.60 - 1.30 mg/dL    Sodium 131 (L) 138 - 146 mmol/L    Potassium 4.9 3.5 - 4.9 mmol/L    Chloride 93 (L) 98 - 109 mmol/L    Total CO2 28 24 - 29 mmol/L    Hemoglobin 13.6 12.0 - 17.0 g/dL    Hematocrit 40 38 - 51 %    Ionized Calcium 1.06 (L) 1.20 - 1.32 mmol/L   Comprehensive Metabolic Panel    Collection Time: 08/25/17  4:45 PM   Result Value Ref Range    Glucose 109 (H) 70 - 100  mg/dL    BUN 47 (H) 9 - 23 mg/dL    Creatinine 11.20 (H) 0.60 - 1.30 mg/dL    Sodium 132 132 - 146 mmol/L    Potassium 3.4 (L) 3.5 - 5.5 mmol/L    Chloride 94 (L) 99 - 109 mmol/L    CO2 24.0 20.0 - 31.0 mmol/L    Calcium 9.0 8.7 - 10.4 mg/dL    Total Protein 7.4 5.7 - 8.2 g/dL    Albumin 3.90 3.20 - 4.80 g/dL    ALT (SGPT) 18 7 - 40 U/L    AST (SGOT) 17 0 - 33 U/L    Alkaline Phosphatase 101 (H) 25 - 100 U/L    Total Bilirubin 0.2 (L) 0.3 - 1.2 mg/dL    eGFR  African Amer 6 (L) >60 mL/min/1.73    Globulin 3.5 gm/dL    A/G Ratio 1.1 (L) 1.5 - 2.5 g/dL    BUN/Creatinine Ratio 4.2 (L) 7.0 - 25.0    Anion Gap 14.0 (H) 3.0 - 11.0 mmol/L   Lipase    Collection Time: 08/25/17  4:45 PM   Result Value Ref Range    Lipase 84 (H) 6 - 51 U/L   Green Top (Gel)    Collection Time: 08/25/17  4:45 PM   Result Value Ref Range    Extra Tube Hold for add-ons.    Gold Top - SST    Collection Time: 08/25/17  4:45 PM   Result Value Ref Range    Extra Tube Hold for add-ons.    Magnesium    Collection Time: 08/25/17  4:45 PM   Result Value Ref Range    Magnesium 1.7 1.3 - 2.7 mg/dL   Phosphorus    Collection Time: 08/25/17  4:45 PM   Result Value Ref Range    Phosphorus 4.7 2.4 - 5.1 mg/dL   Troponin    Collection Time: 08/25/17  4:45 PM   Result Value Ref Range    Troponin I <0.006 <=0.039 ng/mL   Clostridium Difficile Toxin, PCR    Collection Time: 08/25/17  5:14 PM   Result Value Ref Range    C. Difficile Toxins by PCR Detected (A) Negative     Note: In addition to lab results from this visit, the labs listed above may include labs taken at another facility or during a different encounter within the last 24 hours. Please correlate lab times with ED admission and discharge times for further clarification of the services performed during this visit.    No orders to display     Vitals:    08/25/17 1543 08/25/17 1544 08/25/17 1630 08/25/17 1645   BP:   97/75 93/73   BP Location:       Patient Position:       Pulse:   94 102   Resp:        Temp: 97.7 °F (36.5 °C)      TempSrc: Oral      SpO2:  100% 97% 100%   Weight:       Height:         Medications   sodium chloride 0.9 % flush 10 mL (not administered)   metroNIDAZOLE (FLAGYL) tablet 500 mg (not administered)   albumin human 25 % IV SOLN 12.5 g (0 g Intravenous Stopped 8/25/17 1800)   HYDROcodone-acetaminophen (NORCO)  MG per tablet 1 tablet (1 tablet Oral Given 8/25/17 1648)     ECG/EMG Results (last 24 hours)     Procedure Component Value Units Date/Time    ECG 12 Lead [[id number]] Collected:  08/25/17 1546     Updated:  08/25/17 1636    Narrative:       Test Reason : HYPOTENSION  Blood Pressure : **/** mmHG  Vent. Rate : 098 BPM     Atrial Rate : 120 BPM     P-R Int : 000 ms          QRS Dur : 100 ms      QT Int : 458 ms       P-R-T Axes : 000 028 060 degrees     QTc Int : 584 ms    Atrial fibrillation  Nonspecific T wave abnormality , probably digitalis effect  Prolonged QT  Abnormal ECG  When compared with ECG of 22-DEC-2016 06:30,  No significant change was found    Confirmed by SHLOMO FORTE MD ([id number]) on 8/25/2017 4:36:27 PM    Referred By:  ED MD           Confirmed By:SHLOMO FORTE MD                      [facility]    Final diagnoses:   C. difficile colitis   Other specified hypotension   ESRD (end stage renal disease)       Documentation assistance provided by noris Gonzalez.  Information recorded by the noris was done at my direction and has been verified and validated by me.     Lisa Prieot  08/25/17 1802       Lisa Prieto  08/25/17 1935       Lisa Prieto  08/25/17 2007       Shlomo Forte MD  08/26/17 0056

## 2017-08-26 NOTE — DISCHARGE INSTRUCTIONS
Do not perform your dialysis tonight, resume tomorrow.  Decrease your carvedilol to once daily until you are over your diarrhea and your blood pressure has recovered to its usual pressure.  Return to the ER if you have worsening symptoms, including more diarrhea, more bleeding, or persistent low blood pressure.

## 2017-09-12 PROBLEM — K92.2 GI BLEED: Status: ACTIVE | Noted: 2017-01-01

## 2017-09-12 PROBLEM — IMO0001 HISTORY OF CLOSTRIDIUM DIFFICILE: Status: ACTIVE | Noted: 2017-01-01

## 2017-09-12 PROBLEM — D64.9 ANEMIA: Status: ACTIVE | Noted: 2017-01-01

## 2017-09-12 PROBLEM — K92.2 ACUTE UPPER GI BLEED: Status: ACTIVE | Noted: 2017-01-01

## 2017-09-12 PROBLEM — I10 HYPERTENSION: Status: ACTIVE | Noted: 2017-01-01

## 2017-09-12 PROBLEM — R79.89 ELEVATED LACTIC ACID LEVEL: Status: ACTIVE | Noted: 2017-01-01

## 2017-09-12 NOTE — CONSULTS
[facility] Consult    Referring Provider: Yuriy Phoenix MD    PCP: Merry Thompson MD    Reason for Consultation: Upper GI bleed    Chief complaint: rectal bleeding and weakness    History of present illness:    Justin Ruvalcaba Jr. is a 62 y.o. male who is admitted with massive upper GI bleeding.  The patient developed several episodes of melena this morning.  He had associated near syncope with while trying to use the bathroom.  Upon arrival to the emergency room, he had large hematemesis.  Patient was resuscitated with improvement and tachycardia and hypotension.  The patient notes that he has had hypotension for several weeks, which was not symptomatic.  She takes indomethacin regularly.  He is also on warfarin, and received prothrombin complex and vitamin K for supratherapeutic INR.  He denies abdominal pain or nausea.  He recently finished treatment for C. difficile infection.    Allergies:  Vancomycin and Corticosteroids    Scheduled Meds:    [START ON 9/13/2017] cefepime 1 g Intravenous Q24H   metroNIDAZOLE 500 mg Intravenous Q8H   And      cefepime 1 g Intravenous Once   DAPTOmycin (CUBICIN)   mg Intravenous Q24H   ondansetron 4 mg Intravenous Once        Infusions:    pantoprazole 8 mg/hr Last Rate: 8 mg/hr (09/12/17 1712)   Pharmacy Consult         PRN Meds:  ipratropium-albuterol  •  metoprolol tartrate  •  Pharmacy Consult  •  sodium chloride  •  sodium chloride      ROS: Review of Systems   Gastrointestinal:        Hematemesis and melena   Musculoskeletal: Positive for back pain.   Neurological: Positive for light-headedness.   All other systems reviewed and are negative.      PAST MED HX:  Past Medical History:   Diagnosis Date   • Anemia    • Arthritis    • Back pain    • Chronic kidney disease    • Coronary artery disease     ONE CORONARY STENT PLACED 11/2015   • CPAP (continuous positive airway pressure) dependence    • Hypertension     CONTROLLED WITH MEDS PER PT    • Patient on  "peritoneal dialysis     8 HOURS AT HS    • Sleep apnea    • Wears dentures     FULL PLATE ON TOP PARTIAL PLATE ON BOTTOM    • Wears glasses        PAST SURG HX:  Past Surgical History:   Procedure Laterality Date   • ARTERIOVENOUS FISTULA      LUE-FOR DIALYSIS PATIENT STATES \"I NEVER USED IT SO IT CLOSED UP\"    • BACK SURGERY     • CARDIAC CATHETERIZATION  11/2015    ONE CORONARY STENT PLACED    • COLONOSCOPY  2013   • INSERTION PERITONEAL DIALYSIS CATHETER     • PARATHYROIDECTOMY     • SPINAL CORD STIMULATOR IMPLANT N/A 12/22/2016    Procedure: SPINAL CORD STIMULATOR INSERTION;  Surgeon: Chon Vo MD;  Location: [facility];  Service:        [facility] HX:  Family History   Problem Relation Age of Onset   • Hypertension Mother    • Hypertension Father    • Stroke Father    • Diabetes Sister    • Hypertension Sister        SOC HX:  Social History     Social History   • Marital status:      Spouse name: N/A   • Number of children: N/A   • Years of education: N/A     Occupational History   • Not on file.     Social History Main Topics   • Smoking status: Current Every Day Smoker     Packs/day: 3.00     Types: Cigars   • Smokeless tobacco: Never Used      Comment: 3 SMALL CIGARS PER DAY --STARTED CIGARS 2 YEARS AGO, STOPPED SMOKING CIGARETTES 10 YEARS   • Alcohol use Yes      Comment: RARE PER PT    • Drug use: No   • Sexual activity: Defer     Other Topics Concern   • Not on file     Social History Narrative       PHYSICAL EXAM  BP 91/62  Pulse (!) 130  Temp 97.7 °F (36.5 °C) (Oral)   Resp 16  Ht 72\" (182.9 cm)  Wt (!) 335 lb (152 kg)  SpO2 97%  BMI 45.43 kg/m2  Wt Readings from Last 3 Encounters:   09/12/17 (!) 335 lb (152 kg)   08/25/17 (!) 340 lb (154 kg)   04/10/17 (!) 347 lb (157 kg)   ,body mass index is 45.43 kg/(m^2).  Physical Exam   Constitutional: He is oriented to person, place, and time. He appears well-developed and well-nourished.   Chronically ill appearing   HENT:   Head: Normocephalic. "   Eyes: Conjunctivae are normal. No scleral icterus.   Neck: Normal range of motion.   Cardiovascular: Regular rhythm.    + Tachycardia   Pulmonary/Chest: Effort normal and breath sounds normal.   Abdominal: Soft. There is no tenderness.   Obese.  Peritoneal dialysis catheter in place without induration, drainage, or erythema.   Musculoskeletal:   Trace ankle edema   Neurological: He is alert and oriented to person, place, and time.   Skin: Skin is warm and dry. No rash noted.   Psychiatric: He has a normal mood and affect. His behavior is normal.         Results Review:   I reviewed the patient's new clinical results.    Lab Results   Component Value Date    WBC 8.91 09/12/2017    HGB 8.0 (L) 09/12/2017    HGB 12.8 (L) 08/25/2017    HGB 13.6 08/25/2017    HCT 24.5 (L) 09/12/2017    .2 (H) 09/12/2017     09/12/2017       Lab Results   Component Value Date    INR 1.19 09/12/2017    INR  09/12/2017      Comment:      Unable to calculate INR due to elevated PT result.    INR 0.90 09/07/2016       Lab Results   Component Value Date    GLUCOSE 105 (H) 09/12/2017    BUN 89 (H) 09/12/2017    CREATININE 10.30 (H) 09/12/2017    EGFRIFAFRI 6 (L) 09/12/2017    BCR 8.6 09/12/2017    CO2 22.0 09/12/2017    CALCIUM 8.8 09/12/2017    ALBUMIN 3.20 09/12/2017    AST 13 09/12/2017    ALT 10 09/12/2017         ASSESSMENTS/PLANS    Hematemesis  Melena  Acute blood loss anemia    >> Agree with reversal of anticoagulation and Protonix drip  >> Plan EGD in a.m. with therapeutic intent.    I discussed the patients findings and my recommendations with patient and family    Mark I. Brunner, MD  09/12/17  7:14 PM

## 2017-09-12 NOTE — ED PROVIDER NOTES
Subjective   HPI Comments: 62-year-old male presents to the emergency department after experiencing several syncopal episodes today.  The patient states that he passed out this morning while on the toilet.  He then had 2 more syncopal episodes while going to see Dr. Terrell (infectious dz) today for follow up after treatment for recent c. Difficile colitis.  He has a history of atrial fibrillation and is on Coumadin.  He denies passing any blood in his stool but notes he occasionally has a small amount of blood from his hemorrhoids.  He denies any chest pain or SOA.  No vomiting.  His c. Diff was txd with abx and his diarrhea has resolved.  He is unaware of any palpitations or racing heart.  No fever.  Past medical history of hypertension, atrial fibrillation, C. difficile colitis, chronic kidney disease on peritoneal dialysis, chronic back pain, chronic bilateral knee pain, coronary artery disease with a single stent.  He also has a spinal stimulator.  His PCP is Merry couch and his nephrologist is Dr. Norris.  His cardiologist is Dr. Gomez.  He smokes cigars.  He occasionally has an alcoholic beverage.  He denies any drug use.     Patient is a 62 y.o. male presenting with syncope.   History provided by:  Patient  Syncope   Episode history:  Multiple  Most recent episode:  Today  Timing:  Sporadic  Chronicity:  New  Context: sitting down    Witnessed: yes    Relieved by:  Nothing  Worsened by:  Nothing  Ineffective treatments:  None tried  Associated symptoms: dizziness    Associated symptoms: no chest pain, no diaphoresis, no difficulty breathing, no fever, no focal weakness, no headaches, no nausea, no palpitations, no rectal bleeding, no shortness of breath, no vomiting and no weakness        Review of Systems   Constitutional: Negative for chills, diaphoresis and fever.   HENT: Negative for congestion, ear pain, nosebleeds, rhinorrhea and sore throat.    Eyes: Negative for pain, discharge and visual  "disturbance.   Respiratory: Negative for shortness of breath and wheezing.    Cardiovascular: Positive for syncope. Negative for chest pain, palpitations and leg swelling.   Gastrointestinal: Negative for abdominal pain, blood in stool, diarrhea, nausea and vomiting.   Endocrine: Negative.    Genitourinary: Negative for dysuria, hematuria and urgency.   Musculoskeletal: Negative for arthralgias and back pain.   Skin: Negative for pallor and rash.   Allergic/Immunologic: Negative for immunocompromised state.   Neurological: Positive for dizziness. Negative for focal weakness, speech difficulty, weakness and headaches.   Hematological: Negative for adenopathy. Does not bruise/bleed easily.   Psychiatric/Behavioral: Negative.        Past Medical History:   Diagnosis Date   • Anemia    • Arthritis    • Back pain    • Chronic kidney disease    • Coronary artery disease     ONE CORONARY STENT PLACED 11/2015   • CPAP (continuous positive airway pressure) dependence    • Hypertension     CONTROLLED WITH MEDS PER PT    • Patient on peritoneal dialysis     8 HOURS AT HS    • Sleep apnea    • Wears dentures     FULL PLATE ON TOP PARTIAL PLATE ON BOTTOM    • Wears glasses        Allergies   Allergen Reactions   • Vancomycin Rash   • Corticosteroids      PATIENT REPORTS VISION PROBLEMS AFTER STEROID INJECTIONS IN HIS BACK        Past Surgical History:   Procedure Laterality Date   • ARTERIOVENOUS FISTULA      LUE-FOR DIALYSIS PATIENT STATES \"I NEVER USED IT SO IT CLOSED UP\"    • BACK SURGERY     • CARDIAC CATHETERIZATION  11/2015    ONE CORONARY STENT PLACED    • COLONOSCOPY  2013   • INSERTION PERITONEAL DIALYSIS CATHETER     • PARATHYROIDECTOMY     • SPINAL CORD STIMULATOR IMPLANT N/A 12/22/2016    Procedure: SPINAL CORD STIMULATOR INSERTION;  Surgeon: Chon Vo MD;  Location: [facility];  Service:        Family History   Problem Relation Age of Onset   • Hypertension Mother    • Hypertension Father    • Stroke Father  "   • Diabetes Sister    • Hypertension Sister        Social History     Social History   • Marital status:      Spouse name: N/A   • Number of children: N/A   • Years of education: N/A     Social History Main Topics   • Smoking status: Current Every Day Smoker     Packs/day: 3.00     Types: Cigars   • Smokeless tobacco: Never Used      Comment: 3 SMALL CIGARS PER DAY --STARTED CIGARS 2 YEARS AGO, STOPPED SMOKING CIGARETTES 10 YEARS   • Alcohol use Yes      Comment: RARE PER PT    • Drug use: No   • Sexual activity: Defer     Other Topics Concern   • None     Social History Narrative           Objective   Physical Exam   Constitutional: He is oriented to person, place, and time. He appears well-developed and well-nourished. No distress.   HENT:   Head: Normocephalic and atraumatic.   Nose: Nose normal.   Mouth/Throat: Oropharynx is clear and moist.   Eyes: EOM are normal. Pupils are equal, round, and reactive to light. No scleral icterus.   Neck: Normal range of motion. Neck supple.   Cardiovascular: Normal rate, regular rhythm, normal heart sounds and intact distal pulses.    No murmur heard.  Pulmonary/Chest: Effort normal and breath sounds normal. No respiratory distress. He has no wheezes. He has no rales. He exhibits no tenderness.   Abdominal: Soft. Bowel sounds are normal. There is no tenderness. There is no rebound and no guarding.   Genitourinary:   Genitourinary Comments: Maroon colored stool on BRENNAN.  Briskly guaiac positive.   Musculoskeletal: Normal range of motion. He exhibits no edema or tenderness.   Neurological: He is alert and oriented to person, place, and time.   Skin: Skin is warm and dry. No rash noted. He is not diaphoretic.   Psychiatric: He has a normal mood and affect.   Nursing note and vitals reviewed.      Critical Care  Performed by: ANGEL BOWEN  Authorized by: LIZBETH CORMIER   Total critical care time: 60 minutes  Critical care time was exclusive of separately billable  procedures and treating other patients.  Critical care was necessary to treat or prevent imminent or life-threatening deterioration of the following conditions: hypotension, GI bleed, recurrent syncope, atrial fibrillation with RVR.  Critical care was time spent personally by me on the following activities: evaluation of patient's response to treatment, ordering and performing treatments and interventions, development of treatment plan with patient or surrogate, examination of patient, ordering and review of laboratory studies, re-evaluation of patient's condition, obtaining history from patient or surrogate, ordering and review of radiographic studies and review of old charts.               ED Course  ED Course   Value Comment By Time   Globulin: 3.1 (Reviewed) Yuriy Phoenix MD 09/12 1547    4:29 PM  The patient has been hypotensive with pressures as low as 70/40 while in the emergency department.  He has been aggressively hydrated with a liter of saline.  His blood pressure currently is 130/60.  His H&H is 8/24 and this is acute compared to normal H&H just 3 weeks ago.  A rectal exam was performed and he had Maroon-colored stool that was briskly guaiac positive.  The patient has complained of chronic low back pain and bilateral knee pain and requests something for pain.  He was given 0.5 mg of Dilaudid.  INR and chemistries are pending.  His lactic acid was 3.7.  EKG shows A. fib with RVR with a rate in the 120s to 130s.  The patient was given 250 µg of digoxin IV.  The patient will require admission to ICU.  The case was discussed with Dr. Wells.  He has been in to see the patient several times and will talk with the intensivist for admission.    4:29 PM  Still awaiting INR to see if he needs emergent reversal of his coumadin.      Pt had episode of vomiting and he had bright red blood in his emesis.  INR not back yet, but went ahead with emergent reversal of his coumadin.  Pt is on Indocin, aspirin, and  Coumadin, making him high risk for GI bleeding.  I've ordered two units PRBCs to be transfused and will consult GI.  Pt has never had an EGD.                MDM    Final diagnoses:   Acute upper GI bleed   Hypotension, unspecified hypotension type   Hematemesis with nausea   Syncope, unspecified syncope type            FERNIE Castro  09/12/17 [id number]

## 2017-09-12 NOTE — ED PROVIDER NOTES
I've seen and examined Mr. Ruvalcaba.  Is a very pleasant, unfortunate 62-year-old male has end-stage renal disease on CAPD but no history of GI bleeding.  He is never had EGD that he knows of.  He is chronically anticoagulated on Coumadin for atrial fibrillation.  He comes the emergency room today from Dr. Terrell'ss office with hypotension and repeated episodes of passing out.    He was recently treated by  for C. difficile and his symptoms had improved and he was off his oral Flagyl.    In the ER here he has been intermittently hypotensive.  Also has been tachycardic with A. fib with RVR.    On my exam his heart was rapid and irregular but no murmurs his abdomen was obese soft nontender.  Rectal exam by Mr. Doyle showed maroon stool that was strongly guaiac positive.  While in the ER the patient began to vomit bright red blood.      The patient's labs are noted below.    In ER he had IV access obtained and was given boluses of IV fluid and a low dose of digoxin in order to control his rate.  When he started to vomit blood and he was treated with IV proton pump inhibitor.    I've a call to Dr. Alexis, GI, regarding the patient is on Indocin, aspirin, and Coumadin therefore.  High risk of this GI bleed.  I think he needs EGD.    We typed and crossed the patient for blood he is also getting Kcentra to reverse his INR which is quite elevated.       I've a call to Dr. Yasir Blunt, critical care medicine, to admit the patient.    All are agreeable with the plan    Recent Results (from the past 24 hour(s))   Comprehensive Metabolic Panel    Collection Time: 09/12/17  1:50 PM   Result Value Ref Range    Glucose 105 (H) 70 - 100 mg/dL    BUN 89 (H) 9 - 23 mg/dL    Creatinine 10.30 (H) 0.60 - 1.30 mg/dL    Sodium 128 (L) 132 - 146 mmol/L    Potassium 4.4 3.5 - 5.5 mmol/L    Chloride 88 (L) 99 - 109 mmol/L    CO2 22.0 20.0 - 31.0 mmol/L    Calcium 8.8 8.7 - 10.4 mg/dL    Total Protein 6.3 5.7 - 8.2 g/dL     Albumin 3.20 3.20 - 4.80 g/dL    ALT (SGPT) 10 7 - 40 U/L    AST (SGOT) 13 0 - 33 U/L    Alkaline Phosphatase 83 25 - 100 U/L    Total Bilirubin 0.1 (L) 0.3 - 1.2 mg/dL    eGFR  African Amer 6 (L) >60 mL/min/1.73    Globulin 3.1 gm/dL    A/G Ratio 1.0 (L) 1.5 - 2.5 g/dL    BUN/Creatinine Ratio 8.6 7.0 - 25.0    Anion Gap 18.0 (H) 3.0 - 11.0 mmol/L   Magnesium    Collection Time: 09/12/17  1:50 PM   Result Value Ref Range    Magnesium 1.6 1.3 - 2.7 mg/dL   Green Top (Gel)    Collection Time: 09/12/17  1:50 PM   Result Value Ref Range    Extra Tube Hold for add-ons.    Lavender Top    Collection Time: 09/12/17  1:50 PM   Result Value Ref Range    Extra Tube hold for add-on    Gold Top - SST    Collection Time: 09/12/17  1:50 PM   Result Value Ref Range    Extra Tube Hold for add-ons.    CBC Auto Differential    Collection Time: 09/12/17  1:50 PM   Result Value Ref Range    WBC 8.91 3.50 - 10.80 10*3/mm3    RBC 2.42 (L) 4.20 - 5.76 10*6/mm3    Hemoglobin 8.0 (L) 13.1 - 17.5 g/dL    Hematocrit 24.5 (L) 38.9 - 50.9 %    .2 (H) 80.0 - 99.0 fL    MCH 33.1 (H) 27.0 - 31.0 pg    MCHC 32.7 32.0 - 36.0 g/dL    RDW 16.7 (H) 11.3 - 14.5 %    RDW-SD 59.8 (H) 37.0 - 54.0 fl    MPV 9.9 6.0 - 12.0 fL    Platelets 191 150 - 450 10*3/mm3    Neutrophil % 73.6 (H) 41.0 - 71.0 %    Lymphocyte % 15.7 (L) 24.0 - 44.0 %    Monocyte % 8.5 0.0 - 12.0 %    Eosinophil % 0.6 0.0 - 3.0 %    Basophil % 0.3 0.0 - 1.0 %    Immature Grans % 1.3 (H) 0.0 - 0.6 %    Neutrophils, Absolute 6.55 1.50 - 8.30 10*3/mm3    Lymphocytes, Absolute 1.40 0.60 - 4.80 10*3/mm3    Monocytes, Absolute 0.76 0.00 - 1.00 10*3/mm3    Eosinophils, Absolute 0.05 0.00 - 0.30 10*3/mm3    Basophils, Absolute 0.03 0.00 - 0.20 10*3/mm3    Immature Grans, Absolute 0.12 (H) 0.00 - 0.03 10*3/mm3   Lactic Acid, Plasma    Collection Time: 09/12/17  1:50 PM   Result Value Ref Range    Lactate 3.7 (C) 0.5 - 2.0 mmol/L   Protime-INR    Collection Time: 09/12/17  1:50 PM   Result  Value Ref Range    Protime >100.0 (C) 9.6 - 11.5 Seconds    INR     POC Troponin, Rapid    Collection Time: 09/12/17  1:51 PM   Result Value Ref Range    Troponin I 0.00 0.00 - 0.07 ng/mL   POCT Occult Blood, stool    Collection Time: 09/12/17  3:47 PM   Result Value Ref Range    Fecal Occult Blood Positive (A) Negative    Lot Number [id number]     Expiration Date 3/20     DEVELOPER LOT NUMBER [id number]     DEVELOPER EXPIRATION DATE 5/20     Positive Control Positive Positive    Negative Control Negative Negative     Note: In addition to lab results from this visit, the labs listed above may include labs taken at another facility or during a different encounter within the last 24 hours. Please correlate lab times with ED admission and discharge times for further clarification of the services performed during this visit.    No orders to display     Vitals:    09/12/17 1214 09/12/17 1351 09/12/17 1412 09/12/17 1500   BP:  95/43 90/44 99/61   BP Location:   Right arm    Patient Position:   Lying    Pulse:   120    Resp:       Temp:       TempSrc:       SpO2: 95% 97% 96% 100%   Weight:       Height:         Medications   sodium chloride 0.9 % flush 10 mL (not administered)   pantoprazole (PROTONIX) 40 mg in sodium chloride 0.9 % 100 mL (0.4 mg/mL) IVPB-MBP (not administered)   prothrombin complex conc human (KCentra) IV solution 3,500 Units (not administered)     And   phytonadione (AQUA-MEPHYTON, VITAMIN K) 10 mg in sodium chloride 0.9 % 50 mL IVPB (not administered)   sodium chloride 0.9 % bolus 1,000 mL (0 mL Intravenous Stopped 9/12/17 1526)   digoxin (LANOXIN) injection 250 mcg (250 mcg Intravenous Given 9/12/17 1416)   HYDROmorphone (DILAUDID) injection 0.5 mg (0.5 mg Intravenous Given 9/12/17 1519)   pantoprazole (PROTONIX) injection 80 mg (80 mg Intravenous Given 9/12/17 1522)     ECG/EMG Results (last 24 hours)     Procedure Component Value Units Date/Time    ECG 12 Lead [[id number]] Collected:  09/12/17 1206      Updated:  09/12/17 1432    Narrative:       Test Reason : SYNCOPE  Blood Pressure : **/** mmHG  Vent. Rate : 133 BPM     Atrial Rate : 117 BPM     P-R Int : 000 ms          QRS Dur : 096 ms      QT Int : 268 ms       P-R-T Axes : 000 015 083 degrees     QTc Int : 398 ms    Atrial fibrillation with premature ventricular or aberrantly conducted  complexes  Nonspecific T wave abnormality , probably digitalis effect  Abnormal ECG  When compared with ECG of 25-AUG-2017 15:46,  Nonspecific T wave abnormality now evident in Inferior leads  Confirmed by ILZBETH PHOENIX MD ([id number]) on 9/12/2017 2:32:38 PM    Referred By:  KENNETH CONNOLLY           Confirmed By:LIZBETH Phoenix MD  09/13/17 9333

## 2017-09-12 NOTE — H&P
"Pulmonary/Critical Care History and Physical Exam     LOS: 0 days   Patient Care Team:  Merry Thompson MD as PCP - General (Family Medicine)  Merry Thompson MD as Consulting Physician (Family Medicine)  Chon Vo MD as Surgeon (Neurosurgery)  Isidro Gomez MD as Consulting Physician (Cardiology)  Radha Olivo PA-C as Physician Assistant (Physician Assistant)  Santiago Cedeno MD as Consulting Physician (Anesthesiology)    Chief Complaint:    Chief Complaint   Patient presents with   • Syncope       Subjective     HPI:   Justin Ruvalcaba Jr. is a 62 y.o. male, present smoker, who presents to emergency department after experiencing multiple syncopal episodes today.  Patient reports \"passing out\" this morning while sitting on the toilet.  He further experienced 2 more episodes while visiting Dr. Rankin with infectious disease for follow-up after recent treatment for Clostridium difficile colitis.  Patient reports that he finished 10 days worth of antibiotics approximately 1 week ago and has not had recurrent diarrhea.  Further the patient has a history of multiple comorbidities including atrial fibrillation, hypertension, chronic kidney disease, coronary artery disease.  He is on chronic peritoneal dialysis, and also receives Coumadin therapy.  His PT and INR were quite high and his PT was greater than 100.  Patient uses an aspirin daily.  Hemoglobin was 8 today.  He is experienced multiple episodes of nausea with hematemesis.  Further he has been found to have positive guaiac, with maroon/bloody stool.  Patient denies recent history of fever/chills, chest pain/shortness of air, abdominal pain, and denies frequent use of alcohol, or illicit drug use.  He is not using other NSAIDs except for aspirin and denies use specifically of ibuprofen and naproxen.  His cardiologist is Dr. Gomez, his nephrologist is Dr. Norris.  His wife states that he has been going through this particular episode of " "not feeling well for the past several weeks.      History taken from: patient, wife, chart    Past Medical History:   Diagnosis Date   • Anemia    • Arthritis    • Back pain    • Chronic kidney disease    • Coronary artery disease     ONE CORONARY STENT PLACED 11/2015   • CPAP (continuous positive airway pressure) dependence    • Hypertension     CONTROLLED WITH MEDS PER PT    • Patient on peritoneal dialysis     8 HOURS AT HS    • Sleep apnea    • Wears dentures     FULL PLATE ON TOP PARTIAL PLATE ON BOTTOM    • Wears glasses        Past Surgical History:   Procedure Laterality Date   • ARTERIOVENOUS FISTULA      LUE-FOR DIALYSIS PATIENT STATES \"I NEVER USED IT SO IT CLOSED UP\"    • BACK SURGERY     • CARDIAC CATHETERIZATION  11/2015    ONE CORONARY STENT PLACED    • COLONOSCOPY  2013   • INSERTION PERITONEAL DIALYSIS CATHETER     • PARATHYROIDECTOMY     • SPINAL CORD STIMULATOR IMPLANT N/A 12/22/2016    Procedure: SPINAL CORD STIMULATOR INSERTION;  Surgeon: Chon Vo MD;  Location: [facility];  Service:        Family History   Problem Relation Age of Onset   • Hypertension Mother    • Hypertension Father    • Stroke Father    • Diabetes Sister    • Hypertension Sister        Social History     Social History   • Marital status:      Spouse name: N/A   • Number of children: N/A   • Years of education: N/A     Occupational History   • Not on file.     Social History Main Topics   • Smoking status: Current Every Day Smoker     Packs/day: 3.00     Types: Cigars   • Smokeless tobacco: Never Used      Comment: 3 SMALL CIGARS PER DAY --STARTED CIGARS 2 YEARS AGO, STOPPED SMOKING CIGARETTES 10 YEARS   • Alcohol use Yes      Comment: RARE PER PT    • Drug use: No   • Sexual activity: Defer     Other Topics Concern   • Not on file     Social History Narrative       Allergies   Allergen Reactions   • Vancomycin Rash   • Corticosteroids      PATIENT REPORTS VISION PROBLEMS AFTER STEROID INJECTIONS IN HIS BACK  "       PMH/FH/SocH were reviewed by me and updates were made.     Review of Systems:    Review of 14 systems was completed with positives and pertinent negatives noted in the subjective section.  All other systems reviewed and are negative.         Objective     Vital Signs  Temp:  [97.7 °F (36.5 °C)] 97.7 °F (36.5 °C)  Heart Rate:  [120-133] 131  Resp:  [16-20] 16  BP: ()/(43-91) 96/75  Body mass index is 45.43 kg/(m^2).       Physical Exam:     General Appearance:    Alert, cooperative, in no acute distress   Head:    Normocephalic, without obvious abnormality, atraumatic   Eyes:            Lids and lashes normal, conjunctivae and sclerae normal, no   icterus, no pallor, corneas clear, PERRL   ENMT:   Ears appear intact with no abnormalities noted      No oral lesions, no thrush, oral mucosa moist   Neck:   No adenopathy, supple, trachea midline, no thyromegaly, no   carotid bruit, no JVD   Lungs/resp:     Normal effort, symmetric chest rise, no crepitus, clear to      auscultation bilaterally, no chest wall tenderness                  Heart/CV:    Regular rhythm and normal rate, normal S1 and S2, no            Murmur, slight hypotension with systolic blood pressure     Abdomen/GI:     Normal bowel sounds, no masses, no organomegaly, soft        non-tender, non-distended, Peritoneal dialysis catheter RLQ   G/U:     Deferred   Extremities/MSK:   No clubbing, cyanosis or edema.  Normal tone.  No deformities.    Pulses:   Pulses palpable and equal bilaterally   Skin:   No bleeding, bruising or rash   Hem/Lymph:   No cervical or supraclavicular adenopathy.    Neurologic:   Moves all extremities with no obvious focal motor deficit.  Cranial nerves 2 - 12 grossly intact            Psychiatric:  Normal mood and affect, oriented x 3.      Results Review:     I reviewed the patient's new clinical results.     Results from last 7 days  Lab Units 09/12/17  1350   SODIUM mmol/L 128*   POTASSIUM mmol/L 4.4    CHLORIDE mmol/L 88*   CO2 mmol/L 22.0   BUN mg/dL 89*   CREATININE mg/dL 10.30*   CALCIUM mg/dL 8.8   BILIRUBIN mg/dL 0.1*   ALK PHOS U/L 83   ALT (SGPT) U/L 10   AST (SGOT) U/L 13   GLUCOSE mg/dL 105*       Results from last 7 days  Lab Units 09/12/17  1350   WBC 10*3/mm3 8.91   HEMOGLOBIN g/dL 8.0*   HEMATOCRIT % 24.5*   PLATELETS 10*3/mm3 191           I reviewed the patient's new imaging including images and reports.  There are no images pending at this time.    Medication Review:     ondansetron 4 mg Intravenous Once   ondansetron 4 mg Intravenous Once       pantoprazole 8 mg/hr Last Rate: 8 mg/hr (09/12/17 1712)   sodium chloride 100 mL/hr        Assessment/Plan     Hospital Problem List     * (Principal)GI bleed    Overview Signed 9/12/2017  5:18 PM by ERWIN King     Positive for hematemesis, and hematochezia.         Hypertension    Overview Signed 9/12/2017  5:19 PM by ERWIN King     CONTROLLED WITH MEDS PER PT          Elevated lactic acid level    Dependence on peritoneal dialysis    Anemia    History of Clostridium difficile    Overview Signed 9/12/2017  5:21 PM by ERWIN King     Treated for 10 days. Last dose about 1 week prior to admission.         Cigarette nicotine dependence without complication    Acute upper GI bleed        61 y/o AAM w/ h/o ESRD on CAPD, h/o Afib on Coumadin, recent C. Diff treated with flagyl, who comes in 9/12/17 with syncope and GI Bleed.  I suspect the flagyl probably caused the elevation in INR that led to his GIB.      - Admit to ICU  -Consult nephrology, and gastroenterology  -Vitamin K and Kcentra were given in ED for coumadin reversal  -Continue Protonix drip for GI bleed  -IVF, for now no transfusion  -serial Hgb/Hct & Lactic acid  -consider DDAVP if further bleeding  -Recheck C. difficile labs, follow-up on cultures, lactate, will try to get a culture and fluid from PD catheter tonight  -cover w/ dapto (vanc allergy) /  cefepime / flagyl to account for possible PD catheter infection, intra-abdominal, and C. Diff.  Can probably de-escalate fairly quickly if cultures, pro-jasmina, etc negative  -Tobacco cessation  - A.m. labs, chest x-ray  - SCDs for DVT prophylaxis      Refugio gO, APRN  09/12/17  5:28 PM      Critical Care Time:  30 minutes    Patient seen and examined by me.  Note modified to reflect my findings and plan.      Alex Durán MD  Pulmonology and Critical Care Medicine  09/12/17 6:40 PM  Electronically Signed

## 2017-09-13 PROBLEM — K92.0 HEMATEMESIS WITHOUT NAUSEA: Status: ACTIVE | Noted: 2017-01-01

## 2017-09-13 PROBLEM — D62 ACUTE BLOOD LOSS ANEMIA: Status: ACTIVE | Noted: 2017-01-01

## 2017-09-13 PROBLEM — N18.6 ESRD (END STAGE RENAL DISEASE) (HCC): Status: ACTIVE | Noted: 2017-01-01

## 2017-09-13 PROBLEM — Z92.29 HX: ANTICOAGULATION: Status: ACTIVE | Noted: 2017-01-01

## 2017-09-13 PROBLEM — I48.20 CHRONIC ATRIAL FIBRILLATION (HCC): Status: ACTIVE | Noted: 2017-01-01

## 2017-09-13 NOTE — PLAN OF CARE
Problem: GI Endoscopy (Adult)  Goal: Signs and Symptoms of Listed Potential Problems Will be Absent or Manageable (GI Endoscopy)    09/13/17 1243   GI Endoscopy   Problems Assessed (GI Endoscopy) all   Problems Present (GI Endoscopy) bleeding

## 2017-09-13 NOTE — PROGRESS NOTES
"INTENSIVIST NOTE     Hospital Day: 1      Mr. Justin Ruvalcaba Jr., 62 y.o. male is followed for:   GI bleed        SUBJECTIVE     62-year-old male with a history of end-stage renal disease on chronic peritoneal dialysis, chronic atrial fibrillation on Coumadin, recent Clostridium difficile infection, hypertension, and coronary artery disease presented to the emergency department after multiple syncopal episodes and hematemesis.  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  He was over anticoagulated with an INR of greater than 100 which was reversed in the emergency department.    Interval history:    Underwent EGD and there was no active bleeding.  AVM thermally ablated.  INR now normal.  Peritoneal dialysis being initiated and main issue currently is rapid ventricular response to his atrial fibrillation.  Blood pressure remains marginal.    The patient's relevant past medical, surgical and social history were reviewed and updated in Epic as appropriate.       OBJECTIVE     Vital Sign Min/Max for last 24 hours  Temp  Min: 97.7 °F (36.5 °C)  Max: 98.6 °F (37 °C)   BP  Min: 63/46  Max: 154/88   Pulse  Min: 106  Max: 161   Resp  Min: 16  Max: 20   SpO2  Min: 91 %  Max: 100 %   No Data Recorded   No Data Recorded      Intake/Output Summary (Last 24 hours) at 09/13/17 1621  Last data filed at 09/13/17 1400   Gross per 24 hour   Intake             1963 ml   Output             3180 ml   Net            -1217 ml      Flowsheet Rows         First Filed Value    Admission Height  72\" (182.9 cm) Documented at 09/12/2017 1209    Admission Weight  (!)  335 lb (152 kg) Documented at 09/12/2017 1209         Last 3 weights    09/12/17  1209   Weight: (!) 335 lb (152 kg)            Objective:  General Appearance:  In no acute distress and uncomfortable.    Vital signs: (most recent): Blood pressure (!) 104/29, pulse (!) 160, temperature 98.6 °F (37 °C), temperature source Oral, " "resp. rate 18, height 72\" (182.9 cm), weight (!) 335 lb (152 kg), SpO2 93 %.    HEENT: (Nasal cannula O2)    Lungs:  Normal respiratory rate and normal effort.  He is not in respiratory distress.  Breath sounds clear to auscultation.  There are decreased breath sounds.  No wheezes, rales or rhonchi.    Heart: Tachycardia.  Irregular rhythm.  S1 normal and S2 normal.  No murmur, gallop or friction rub.   Chest: Symmetric chest wall expansion.   Abdomen: Abdomen is soft and non-distended.  (Peritoneal dialysis catheter in place)Bowel sounds are normal.   There is no abdominal tenderness.   There is no mass. There is no splenomegaly. There is no hepatomegaly.   Extremities: There is dependent edema.  There is no deformity.    Neurological: Patient is alert and oriented to person, place and time.    Pupils:  Pupils are equal, round, and reactive to light.    Skin:  Warm and dry.              I reviewed the patient's new clinical results.  I reviewed the patient's new imaging results/reports including actual images and agree with reports.      Chest X-Ray:  Enlarged heart.  No pulmonary edema.    INFUSIONS         Results from last 7 days  Lab Units 09/13/17  1219 09/13/17  0431 09/12/17  2111 09/12/17  1350   WBC 10*3/mm3  --  8.10  --  8.91   HEMOGLOBIN g/dL 7.7* 8.2*  8.2* 7.6* 8.0*   HEMATOCRIT % 23.1* 23.9*  23.9* 22.9* 24.5*   PLATELETS 10*3/mm3  --  148*  --  191       Results from last 7 days  Lab Units 09/13/17  0431 09/12/17  1350   SODIUM mmol/L 127* 128*   POTASSIUM mmol/L 4.9 4.4   CHLORIDE mmol/L 93* 88*   CO2 mmol/L 23.0 22.0   BUN mg/dL 102* 89*   GLUCOSE mg/dL 86 105*   CREATININE mg/dL 10.60* 10.30*   MAGNESIUM mg/dL 1.4 1.6   CALCIUM mg/dL 8.1* 8.8                 Mech Ventilation:      I reviewed the patient's medications.    Assessment/Plan   ASSESSMENT      Hospital Problem List     * (Principal)Acute upper GI bleed    Chronic atrial fibrillation    HX: anticoagulation    History of Clostridium " difficile    Overview Signed 9/12/2017  5:21 PM by ERWIN King     Treated for 10 days. Last dose about 1 week prior to admission.         Dependence on peritoneal dialysis    Cigarette nicotine dependence without complication    Anemia    Hypertension    Overview Signed 9/12/2017  5:19 PM by ERWIN King     CONTROLLED WITH MEDS PER PT          Elevated lactic acid level    Acute blood loss anemia    Overview Signed 9/13/2017  8:24 AM by Mark I Brunner, MD     Added automatically from request for surgery [id number]         Hematemesis without nausea    Overview Signed 9/13/2017  8:24 AM by Mark I Brunner, MD     Added automatically from request for surgery [id number]         ESRD (end stage renal disease)            Upper GI bleed presumably due to AVM and over anticoagulation.  Chronic anticoagulation due to chronic atrial fibrillation.  This has been reversed.  His AVM has been thermally ablated.  Peritoneal dialysis being initiated.  Continues to have a high ventricular response to atrial fibrillation.  Underlying LV function well preserved         PLAN     1. Digoxin for rate control   2. Resume Cardizem when blood pressure allows   3. Peritoneal dialysis per renal   4. IV fluid bolus   5. Resume by mouth pain medication          I discussed the patient's findings and my recommendations with patient and nursing staff     Plan of care and goals reviewed with mulitdisciplinary team at daily rounds.    Sunday Botello MD  Pulmonary and Critical Care Medicine  09/13/17 4:21 PM

## 2017-09-13 NOTE — PROGRESS NOTES
Discharge Planning Assessment  [facility]     Patient Name: Justin Ruvalcaba Jr.  MRN: [id number]  Today's Date: 9/13/2017    Admit Date: 9/12/2017          Discharge Needs Assessment       09/13/17 1202    Living Environment    Home Accessibility --   1 level home    Living Environment Comment --   Pt lives with spouse in 1 level home with 1 step to enter home.    Discharge Needs Assessment    Concerns To Be Addressed basic needs concerns;adjustment to diagnosis/illness concerns    Readmission Within The Last 30 Days no previous admission in last 30 days    Outpatient/Agency/Support Group Needs --   no    Anticipated Changes Related to Illness none    Equipment Currently Used at Home oxygen;bipap/ cpap    Equipment Needed After Discharge none    Discharge Contact Information if Applicable Ana Paula Ruvalcaba (spouse)  [phone]            Discharge Plan       09/13/17 1205    Case Management/Social Work Plan    Plan Home    Patient/Family In Agreement With Plan yes    Additional Comments CM met with pt in room, he is independent with ADL's he still drives. Pt denies HH or DME. Pt has home 02 and BIPAP thru [employer]. Pt does have medication coverage for his medication.        Discharge Placement     No information found                Demographic Summary     None            Functional Status       09/13/17 1201    IADL    Housekeeping independent    IADL Comments --   independent            Psychosocial     None            Abuse/Neglect     None            Legal     None            Substance Abuse     None            Patient Forms     None          Lola Do RN

## 2017-09-13 NOTE — PROGRESS NOTES
Adult Nutrition  Assessment/PES    Patient Name:  Justin Ruvalcaba Jr.  YOB: 1955  MRN: [id number]  Admit Date:  9/12/2017    Assessment Date:  9/13/2017        Reason for Assessment       09/13/17 1219    Reason for Assessment    Reason For Assessment/Visit multidisciplinary rounds    Time Spent (min) 20              Nutrition/Diet History       09/13/17 1219    Nutrition/Diet History    Reported/Observed By RN;MD    Other Pt NPO for EGD this am; pt vomitted BRB in ED; Pt w/ hx ESRD and PD                    Nutrition Prescription Ordered       09/13/17 1223    Nutrition Prescription PO    Current PO Diet NPO                Problem/Interventions:                    Nutrition Intervention       09/13/17 1223    Nutrition Intervention    RD/Tech Action Follow Tx progress;Care plan reviewd              Education/Evaluation       09/13/17 1223    Monitor/Evaluation    Monitor Per protocol        Comments:      Electronically signed by:  Sandra Wren RD  09/13/17 12:23 PM

## 2017-09-13 NOTE — CONSULTS
"NAL Consult Note    Justin Ruvalcaba Jr.  1955  [id number]    Date of Admit:  9/12/2017    Date of Consult: 9/13/2017        Requesting Provider: No ref. provider found    Evaluating Physician: Krishna Hayes MD    Reason for Consultation:  ESRD    Chief Complaint:  GI Bleed    History of present illness:    Patient is a 62 y.o.  Yr old male  With a H/O ESRD on PD.  Pt with syncopal episode,  Presented to ED.  Found to have a GI Bleed with BRB emesis.  + NSAIDs on anticoagulation.  Pt admitted for GI w/u.     BP typically runs low.  Denies abd pain or difficulty with PD.  Fluid levels ok.  Breathing ok.        Past Medical History  Past Medical History:   Diagnosis Date   • Anemia    • Arthritis    • Back pain    • Chronic kidney disease    • Coronary artery disease     ONE CORONARY STENT PLACED 11/2015   • CPAP (continuous positive airway pressure) dependence    • Hypertension     CONTROLLED WITH MEDS PER PT    • Patient on peritoneal dialysis     8 HOURS AT HS    • Sleep apnea    • Wears dentures     FULL PLATE ON TOP PARTIAL PLATE ON BOTTOM    • Wears glasses        Past Surgical History:   Procedure Laterality Date   • ARTERIOVENOUS FISTULA      LUE-FOR DIALYSIS PATIENT STATES \"I NEVER USED IT SO IT CLOSED UP\"    • BACK SURGERY     • CARDIAC CATHETERIZATION  11/2015    ONE CORONARY STENT PLACED    • COLONOSCOPY  2013   • INSERTION PERITONEAL DIALYSIS CATHETER     • PARATHYROIDECTOMY     • SPINAL CORD STIMULATOR IMPLANT N/A 12/22/2016    Procedure: SPINAL CORD STIMULATOR INSERTION;  Surgeon: Chon Vo MD;  Location: [facility];  Service:        Allergies:  Allergies   Allergen Reactions   • Vancomycin Rash   • Corticosteroids      PATIENT REPORTS VISION PROBLEMS AFTER STEROID INJECTIONS IN HIS BACK        Medication:   See electronic record    Soc Hx:   Social History     Social History   • Marital status:      Spouse name: N/A   • Number of children: N/A   • Years of education: N/A " "    Social History Main Topics   • Smoking status: Current Every Day Smoker     Packs/day: 3.00     Types: Cigars   • Smokeless tobacco: Never Used      Comment: 3 SMALL CIGARS PER DAY --STARTED CIGARS 2 YEARS AGO, STOPPED SMOKING CIGARETTES 10 YEARS   • Alcohol use Yes      Comment: RARE PER PT    • Drug use: No   • Sexual activity: Defer     Other Topics Concern   • None     Social History Narrative       Fam Hx:  No congenital renal disease      Review of Systems:  Full review of systems reviewed and are as above  In HPI or per admitting H&P,otherwise negative for acute complaints    Physical Exam:   Vital Signs   Blood pressure 121/84, pulse (!) 123, temperature 98.3 °F (36.8 °C), temperature source Oral, resp. rate 18, height 72\" (182.9 cm), weight (!) 335 lb (152 kg), SpO2 96 %.  09/12 0701 - 09/13 0700  In: 2852   Out: -     GENERAL: WD AAM in  NAD.   PSYCHIATRIC:  Awake and alert,  Normal mood and affect. Cooperative with PE  EYE: PE, no icterus, no conjunctivitis  ENT: ommm, dentition intact,  Hearing intact + CPAP  NECK: Supple , No JVD discernable,  Trachea midline, no palp thyroid  CV: irreg irreg and tachy;  no edema  LUNGS:  Quiet,  Nonlabored resp.  Clear to auscultation bilaterally.  Lying flat  ABDOMEN: Soft, nontender, Obese. BS present.  + tenkoff cath.  : no palp bladder, no fagan  SKIN: Warm and dry without rash    Laboratory Data    Results from last 7 days  Lab Units 09/13/17  0431 09/12/17  2111 09/12/17  1350   HEMOGLOBIN g/dL 8.2*  8.2* 7.6* 8.0*   HEMATOCRIT % 23.9*  23.9* 22.9* 24.5*       Results from last 7 days  Lab Units 09/13/17  0431 09/12/17  1350   SODIUM mmol/L 127* 128*   POTASSIUM mmol/L 4.9 4.4   CHLORIDE mmol/L 93* 88*   CO2 mmol/L 23.0 22.0   BUN mg/dL 102* 89*   CREATININE mg/dL 10.60* 10.30*   CALCIUM mg/dL 8.1* 8.8   PHOSPHORUS mg/dL 5.1  --    MAGNESIUM mg/dL 1.4 1.6   ALBUMIN g/dL 2.60* 3.20           Results from last 7 days  Lab Units 09/13/17  0431   ALK PHOS U/L " 67   BILIRUBIN mg/dL 0.4   ALT (SGPT) U/L 10   AST (SGOT) U/L 13         Estimated Creatinine Clearance: 10.9 mL/min (by C-G formula based on Cr of 10.6).    A/P:      ESRD: on PD at home.  Will cont here if able with current GI issues.  ,  Likely due to GI blood resorption.      Hyponatremia:  Can support BP with albumin if needed.  Pt typically runs on the low side. May need to start midodrine.     Hyponatremia:  FW restriction.  Isotonic fluids as needed.  Correct with dialysis.      GI Bleed: EGD today    Anemia:  Transfuse PRN.    Thank you consulting us on Justin Ruvalcaba Jr. who is of high risk and complexity.  We will follow along closely    Krishna Hayes MD  9/13/2017  9:58 AM

## 2017-09-13 NOTE — ANESTHESIA POSTPROCEDURE EVALUATION
Patient: Justin Ruvalcaba Jr.    Procedure Summary     Date Anesthesia Start Anesthesia Stop Room / Location    09/13/17 1235 1311  [facility] /  [facility]       Procedure Diagnosis Surgeon Provider    ESOPHAGOGASTRODUODENOSCOPY (N/A Esophagus) Acute blood loss anemia; Hematemesis without nausea  (Acute blood loss anemia [D62]; Hematemesis without nausea [K92.0]) Mark I Brunner, MD Valerie Ann Gouzd, MD          Anesthesia Type: MAC  Last vitals  BP     118/53   Temp        Pulse    150   Resp     20   SpO2     98%     Post Anesthesia Care and Evaluation    Patient location during evaluation: PACU  Patient participation: complete - patient participated  Level of consciousness: awake  Pain score: 0  Pain management: adequate  Airway patency: patent  Anesthetic complications: No anesthetic complications  PONV Status: none  Cardiovascular status: hemodynamically stable and acceptable  Respiratory status: nonlabored ventilation, acceptable and face mask  Hydration status: acceptable

## 2017-09-13 NOTE — CONSULTS
"INFECTIOUS DISEASE CONSULT/INITIAL HOSPITAL VISIT    Justin Ruvalcaba Jr.  1955  [id number]    Date of Consult: 9/13/2017    Admission Date: 9/12/2017      Requesting Provider: No ref. provider found  Evaluating Physician: Stefan Chi MD    Reason for Consultation: Cdiff colitis     History of present illness:    Patient is a 62 y.o. male followed by out service for Cdiff colitis.  He completed a course of Flagyl, and had been relatively well until yesterday, when he began having syncopal episodes. He passed out twice in our office yesterday and was sent to the ED.  He was found to have a PT of >100, positive heme stool, and had an episode of hematemesis in the ED.  He is scheduled for EGD today. He has been afebrile without leukocytosis since admission.  He states he had a diarrhea stool earlier today.     Past Medical History:   Diagnosis Date   • Anemia    • Arthritis    • Back pain    • Chronic kidney disease    • Coronary artery disease     ONE CORONARY STENT PLACED 11/2015   • CPAP (continuous positive airway pressure) dependence    • Hypertension     CONTROLLED WITH MEDS PER PT    • Patient on peritoneal dialysis     8 HOURS AT HS    • Sleep apnea    • Wears dentures     FULL PLATE ON TOP PARTIAL PLATE ON BOTTOM    • Wears glasses        Past Surgical History:   Procedure Laterality Date   • ARTERIOVENOUS FISTULA      LUE-FOR DIALYSIS PATIENT STATES \"I NEVER USED IT SO IT CLOSED UP\"    • BACK SURGERY     • CARDIAC CATHETERIZATION  11/2015    ONE CORONARY STENT PLACED    • COLONOSCOPY  2013   • INSERTION PERITONEAL DIALYSIS CATHETER     • PARATHYROIDECTOMY     • SPINAL CORD STIMULATOR IMPLANT N/A 12/22/2016    Procedure: SPINAL CORD STIMULATOR INSERTION;  Surgeon: Chon Vo MD;  Location: [facility];  Service:        Family History   Problem Relation Age of Onset   • Hypertension Mother    • Hypertension Father    • Stroke Father    • Diabetes Sister    • Hypertension Sister        Social " History     Social History   • Marital status:      Spouse name: N/A   • Number of children: N/A   • Years of education: N/A     Occupational History   • Not on file.     Social History Main Topics   • Smoking status: Current Every Day Smoker     Packs/day: 3.00     Types: Cigars   • Smokeless tobacco: Never Used      Comment: 3 SMALL CIGARS PER DAY --STARTED CIGARS 2 YEARS AGO, STOPPED SMOKING CIGARETTES 10 YEARS   • Alcohol use Yes      Comment: RARE PER PT    • Drug use: No   • Sexual activity: Defer     Other Topics Concern   • Not on file     Social History Narrative       Allergies   Allergen Reactions   • Vancomycin Rash   • Corticosteroids      PATIENT REPORTS VISION PROBLEMS AFTER STEROID INJECTIONS IN HIS BACK          Medication:    Current Facility-Administered Medications:   •  fentaNYL citrate (PF) (SUBLIMAZE) injection 25 mcg, 25 mcg, Intravenous, Q4H PRN, ERWIN Yancey, 25 mcg at 09/13/17 1139  •  HYDROcodone-acetaminophen (NORCO) 7.5-325 MG per tablet 1 tablet, 1 tablet, Oral, Q4H PRN, Sunday Botello MD, 1 tablet at 09/13/17 1548  •  ipratropium-albuterol (DUO-NEB) nebulizer solution 3 mL, 3 mL, Nebulization, Q6H PRN, ERWIN King  •  metoprolol tartrate (LOPRESSOR) injection 5 mg, 5 mg, Intravenous, Q4H PRN, Alex Durán MD, 5 mg at 09/13/17 0354  •  ondansetron (ZOFRAN) injection 4 mg, 4 mg, Intravenous, Once, FERNIE Castro  •  [START ON 9/14/2017] pantoprazole (PROTONIX) EC tablet 40 mg, 40 mg, Oral, Q AM, ERWIN King  •  sodium chloride 0.9 % bolus 500 mL, 500 mL, Intravenous, Once, Sunday Botello MD  •  sodium chloride 0.9 % flush 1-10 mL, 1-10 mL, Intravenous, PRN, ERWIN King  •  sodium chloride 0.9 % flush 10 mL, 10 mL, Intravenous, PRN, Yuriy Phoenix MD  •  UltraBag/Dianeal PD-2/1.5% Dex (jones #[id number]) (DIANEAL) 2,000 mL, 2,000 mL, Intraperitoneal, 5x Daily, Lissy Gomez, [facility]  •  vancomycin  oral solution 250 mg, 250 mg, Oral, Q6H, Merry ERWIN Zhou, 250 mg at 17 1455    Antibiotics:  IV Anti-Infectives     Ordered     Dose/Rate Route Frequency Start Stop    17 0939  vancomycin oral solution 250 mg     Ordering Provider:  ERWIN Payne    250 mg Oral Every 6 Hours Scheduled 17 1200      17 1844  cefepime (MAXIPIME) 1 g/100 mL 0.9% NS IVPB (mbp)     Ordering Provider:  Alex Durán MD    1 g  over 30 Minutes Intravenous Once 17 1900 17 2201            Review of Systems:  Constitutional-- No Fever, chills or sweats.  Appetite good, and no malaise. + fatigue.  HEENT-- No new vision, hearing or throat complaints.  No epistaxis or oral sores.  Denies odynophagia or dysphagia. No headache, photophobia or neck stiffness.  CV-- No chest pain, palpitation or syncope  Resp-- No SOB/cough/ episode of Hemoptysis in ED  GI- No nausea, vomiting, or diarrhea.  No hematochezia,  +melena, or  +hematemesis. Denies jaundice or chronic liver disease.  -- No dysuria, hematuria, or flank pain.  Denies hesitancy, urgency or flank pain. ESRD on PD   Lymph- no swollen lymph nodes in neck/axilla or groin.   Heme- No active bruising or bleeding; no Hx of DVT or PE.  MS-- no swelling or pain in the bones or joints of arms/legs.  No new back pain.  Neuro-- No acute focal weakness or numbness in the arms or legs.  No seizures.  Skin--No rashes or lesions      Physical Exam:   Vital Signs  Temp (24hrs), Av.2 °F (36.8 °C), Min:97.7 °F (36.5 °C), Max:98.6 °F (37 °C)    Temp  Min: 97.7 °F (36.5 °C)  Max: 98.6 °F (37 °C)  BP  Min: 63/46  Max: 154/88  Pulse  Min: 106  Max: 161  Resp  Min: 16  Max: 20  SpO2  Min: 91 %  Max: 100 %    GENERAL: Awake and alert, in no acute distress.   HEENT: Normocephalic, atraumatic.  PERRL. EOMI. No conjunctival injection. No icterus. Oropharynx clear without evidence of thrush or exudate. No evidence of peridontal disease.    NECK:  Supple without nuchal rigidity. No mass.  LYMPH: No cervical, axillary or inguinal lymphadenopathy.  HEART: RRR; No murmur, rubs, gallops.   LUNGS: Clear to auscultation bilaterally without wheezing, rales, rhonchi. Normal respiratory effort. Nonlabored. No dullness.  ABDOMEN: Soft, nontender, nondistended. Positive bowel sounds. No rebound or guarding. NO mass or HSM. PD catheter in place   EXT:  No cyanosis, clubbing or edema. No cord.  : Genitalia generally unremarkable.  Without Steele catheter.  MSK: FROM without joint effusions noted arms/legs.    SKIN: Warm and dry without cutaneous eruptions on Inspection/palpation.    NEURO: Oriented to PPT. No focal deficits on motor/sensory exam at arms/legs.  PSYCHIATRIC: Normal insight and judgement. Cooperative with PE    Laboratory Data      Results from last 7 days  Lab Units 09/13/17  1219 09/13/17  0431 09/12/17  2111 09/12/17  1350   WBC 10*3/mm3  --  8.10  --  8.91   HEMOGLOBIN g/dL 7.7* 8.2*  8.2* 7.6* 8.0*   HEMATOCRIT % 23.1* 23.9*  23.9* 22.9* 24.5*   PLATELETS 10*3/mm3  --  148*  --  191       Results from last 7 days  Lab Units 09/13/17  0431   SODIUM mmol/L 127*   POTASSIUM mmol/L 4.9   CHLORIDE mmol/L 93*   CO2 mmol/L 23.0   BUN mg/dL 102*   CREATININE mg/dL 10.60*   GLUCOSE mg/dL 86   CALCIUM mg/dL 8.1*       Results from last 7 days  Lab Units 09/13/17  0431   ALK PHOS U/L 67   BILIRUBIN mg/dL 0.4   ALT (SGPT) U/L 10   AST (SGOT) U/L 13               Results from last 7 days  Lab Units 09/13/17  0431   LACTATE mmol/L 1.9             Estimated Creatinine Clearance: 10.9 mL/min (by C-G formula based on Cr of 10.6).      Microbiology:  Blood Culture   Date Value Ref Range Status   09/12/2017 No growth at less than 24 hours  Preliminary                                Radiology:  Imaging Results (last 72 hours)     Procedure Component Value Units Date/Time    XR Chest 1 View [[id number]] Updated:  09/13/17 0400            Impression:   1.  Cdiff colitis-It  is unclear how much of a role this played in his current episode of hypotension but I doubt that it is playing a major role.  I will treat him aggressively with oral vancomycin.  2.  GI bleed-endoscopy later today  3.  Hypotension-secondary to the GI bleed.  There is no current evidence of sepsis so I will discontinue his systemic antibiotic therapy.  4.  ELLEN on CPAP  5.  End-stage renal disease-on hemodialysis      PLAN/RECOMMENDATIONS:   Thank you for asking us to see Justin Ruvalcaba Jr., I recommend the followin.  Oral Vancomycin   2.  Discontinue cefepime and daptomycin  3.  Spore precautions  4.   Evaluation of the GI bleed with endoscopy       Dr. Chi has obtained the history, performed the physical exam and formulated the above treatment plan.     Stefan Chi MD  2017  5:30 PM

## 2017-09-13 NOTE — ANESTHESIA PREPROCEDURE EVALUATION
Anesthesia Evaluation     Patient summary reviewed and Nursing notes reviewed          Airway   Mallampati: III  TM distance: >3 FB  Neck ROM: full  possible difficult intubation  Dental - normal exam     Pulmonary - normal exam   (+) a smoker Current, sleep apnea,   Cardiovascular - normal exam    (+) hypertension, CAD, cardiac stents more than 12 months ago     ROS comment: EF 50%    Neuro/Psych- negative ROS  GI/Hepatic/Renal/Endo    (+) morbid obesity, renal disease ESRD and dialysis,     Musculoskeletal     Abdominal  - normal exam    Bowel sounds: normal.   Substance History - negative use     OB/GYN negative ob/gyn ROS         Other   (+) arthritis                                     Anesthesia Plan    ASA 4     MAC     intravenous induction   Anesthetic plan and risks discussed with patient.    Plan discussed with CRNA.

## 2017-09-13 NOTE — NURSING NOTE
PD initiated. Policy and procedure reviewed with ANTOINE Horta. Patient uses [employer], PD catheter did not require change of the extension. Pt reported 2.5 L infused on 9/11/2017. Drained initially. PD infusion initiated. Supplies adequate. Encouraged to call for needs or assistance.

## 2017-09-13 NOTE — PROGRESS NOTES
Discharge Planning Assessment  [facility]     Patient Name: Justin Ruvalcaba Jr.  MRN: [id number]  Today's Date: 9/13/2017    Admit Date: 9/12/2017          Discharge Needs Assessment       09/13/17 0854    Living Environment    Lives With spouse    Living Arrangements house    Home Accessibility --   1 level home in [facility].    Transportation Available car    Living Environment Comment --   Pt lives with  spouse in [facility].    Living Environment    Provides Primary Care For no one, unable/limited ability to care for self    Primary Care Provided By spouse/significant other    Caregiving Concerns --   no    Quality Of Family Relationships supportive    Able to Return to Prior Living Arrangements yes            Discharge Plan     None        Discharge Placement     No information found                Demographic Summary       09/13/17 0852    Referral Information    Admission Type inpatient    Arrived From admitted as an inpatient    Referral Source admission list    Reason For Consult discharge planning    Record Reviewed clinical discipline documentation;history and physical    Contact Information    Permission Granted to Share Information With     Primary Care Physician Information    Name Vania Ruvalcaba (spouse)  [phone]            Functional Status       09/13/17 0852    Functional Status Prior    Ambulation 0-->independent    Transferring 0-->independent    Toileting 0-->independent    Bathing 0-->independent    Dressing 0-->independent    Eating 0-->independent    Communication 0-->understands/communicates without difficulty    Swallowing 0-->swallows foods/liquids without difficulty    Prior Functional Level Comment --   independent    IADL    Medications independent    Meal Preparation independent    Housekeeping independent    Laundry independent    Shopping independent    Oral Care independent    IADL Comments --   independent    Activity Tolerance    Usual Activity  Tolerance good            Psychosocial     None            Abuse/Neglect     None            Legal     None            Substance Abuse     None            Patient Forms     None      Pt does do peritoneal dialysis 8 hr at  Night.    Lola Do RN

## 2017-09-13 NOTE — BRIEF OP NOTE
ESOPHAGOGASTRODUODENOSCOPY  Procedure Note    Justin Ruvalcaba .  9/12/2017 - 9/13/2017    EGD shows normal esophagus and mild gastritis. 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 No blood or definitive source of hematemesis was seen.    >> Change PPI to PO administration.      Mark I. Brunner, MD     Date: 9/13/2017  Time: 1:49 PM

## 2017-09-14 NOTE — PROGRESS NOTES
Adult Nutrition  Assessment/PES    Patient Name:  Justin Ruvalcaba Jr.  YOB: 1955  MRN: [id number]  Admit Date:  9/12/2017    Assessment Date:  9/14/2017        Reason for Assessment       09/14/17 1450    Reason for Assessment    Reason For Assessment/Visit multidisciplinary rounds    Time Spent (min) 30    Diagnosis Diagnosis    Gastrointestinal Gastrointestinal bleed   s/p EGD w/ ablation of AVM and clipping of duodenal bulb    Metabolic/ICU Hyponatremia    Renal ESRD;Peritoneal dialysis              Nutrition/Diet History       09/14/17 1451    Nutrition/Diet History    Typical Food/Fluid Intake I eat whatever my wife puts on the table; never fld restricted     Reported/Observed By MD    Other Free water restricition needed               Labs/Tests/Procedures/Meds       09/14/17 1452    Labs/Tests/Procedures/Meds    Diagnostic Test/Procedure Review reviewed    Labs/Tests Review Na+                Nutrition Prescription Ordered       09/14/17 1453    Nutrition Prescription PO    Current PO Diet Regular    Common Modifiers Renal                Problem/Interventions:        Problem 1       09/14/17 1453    Nutrition Diagnoses Problem 1    Problem 1 Needs Alternate Composition    Macronutrient Fluid    Etiology (related to) Medical Diagnosis    Metabolic/ICU Hyponatremia    Signs/Symptoms (evidenced by) Biochemical    Specific Labs Noted Na+                    Intervention Goal       09/14/17 1454    Intervention Goal    General Nutrition support treatment    PO Establish PO;Tolerate PO            Nutrition Intervention       09/14/17 1454    Nutrition Intervention    RD/Tech Action Recommend/ordered;Follow Tx progress;Care plan reviewd    Recommended/Ordered Diet            Nutrition Prescription       09/14/17 1454    Nutrition Prescription PO    PO Prescription Begin/change diet    Begin/Change Diet to Regular    Common Modifiers Cardiac    Other Modifiers Other (comment)   Fluid restriciton 1500  ml/d    New PO Prescription Ordered? Yes            Education/Evaluation       09/14/17 1456    Monitor/Evaluation    Monitor Per protocol;I&O;Pertinent labs;Symptoms        Comments:      Electronically signed by:  Sandra Wren RD  09/14/17 2:57 PM

## 2017-09-14 NOTE — NURSING NOTE
PD catheter cleansed with betadine per sterile technique. Dressing at insertion site of PD catheter changed. Policy/procedure and Orders reviewed with RN. Supplies checked. Assisted RN with initial effluent drain/ set up.

## 2017-09-14 NOTE — PROGRESS NOTES
[facility] Progress Note    Admission Date: 2017    Justin Ruvalcaba Jr.  1955  [id number]    Date: 2017    Meds:    IV Anti-Infectives     Ordered     Dose/Rate Route Frequency Start Stop    17 0939  vancomycin oral solution 250 mg     Ordering Provider:  ERWIN Payne    250 mg Oral Every 6 Hours Scheduled 17 1200      17 1844  cefepime (MAXIPIME) 1 g/100 mL 0.9% NS IVPB (mbp)     Ordering Provider:  Alex Durán MD    1 g  over 30 Minutes Intravenous Once 17 1900 17 2201          CC: Cdiff colitis       SUBJECTIVE: 17:  Patient is a 62 y.o. male followed by out service for Cdiff colitis.  He completed a course of Flagyl, and had been relatively well until yesterday, when he began having syncopal episodes. He passed out twice in our office yesterday and was sent to the ED.  He was found to have a PT of >100, positive heme stool, and had an episode of hematemesis in the ED.  He is scheduled for EGD today. He has been afebrile without leukocytosis since admission.  He states he had a diarrhea stool earlier today.   17:  No further diarrhea, had scope yesterday.  Feeling better this am/    ROS:  No f/c/s. No n/v/d. No rash. No new ADR to Abx.     PE:   Vital Signs  Temp (24hrs), Av.4 °F (36.9 °C), Min:97.9 °F (36.6 °C), Max:98.9 °F (37.2 °C)    Temp  Min: 97.9 °F (36.6 °C)  Max: 98.9 °F (37.2 °C)  BP  Min: 91/73  Max: 122/68  Pulse  Min: 96  Max: 137  Resp  Min: 16  Max: 20  SpO2  Min: 87 %  Max: 100 %    GENERAL: Awake and alert, in no acute distress.   HEENT:  No conjunctival injection. No icterus. Oropharynx clear without evidence of thrush or exudate.  NECK: Supple, no JVD     HEART: RRR; No murmur, rubs, gallops.   LUNGS: Clear to auscultation bilaterally without wheezing, rales, rhonchi. Normal respiratory effort. Nonlabored. No dullness.  ABDOMEN: Soft, nontender, nondistended. Positive bowel sounds. No rebound or guarding. NO mass or  HSM.  EXT:  No cyanosis, clubbing or edema. No cord.  : Genitalia generally unremarkable.  Without Steele catheter.  SKIN: Warm and dry without cutaneous eruptions on Inspection/palpation.    NEURO: Alert and oriented x 3, Motor 5/5 bilaterally    Laboratory Data      Results from last 7 days  Lab Units 09/14/17  0418 09/14/17  0010 09/13/17  1756  09/13/17  0431  09/12/17  1350   WBC 10*3/mm3 11.26*  --   --   --  8.10  --  8.91   HEMOGLOBIN g/dL 7.2* 7.3* 7.9*  < > 8.2*  8.2*  < > 8.0*   HEMATOCRIT % 21.8* 22.2* 23.4*  < > 23.9*  23.9*  < > 24.5*   PLATELETS 10*3/mm3 145*  --   --   --  148*  --  191   < > = values in this interval not displayed.    Results from last 7 days  Lab Units 09/14/17  0418   SODIUM mmol/L 127*   POTASSIUM mmol/L 5.2   CHLORIDE mmol/L 94*   CO2 mmol/L 21.0   BUN mg/dL 103*   CREATININE mg/dL 11.20*   GLUCOSE mg/dL 96   CALCIUM mg/dL 7.9*       Results from last 7 days  Lab Units 09/13/17  0431   ALK PHOS U/L 67   BILIRUBIN mg/dL 0.4   ALT (SGPT) U/L 10   AST (SGOT) U/L 13               Results from last 7 days  Lab Units 09/13/17  0431   LACTATE mmol/L 1.9             Estimated Creatinine Clearance: 10.3 mL/min (by C-G formula based on Cr of 11.2).    Microbiology:  Blood Culture   Date Value Ref Range Status   09/12/2017 No growth at 24 hours  Preliminary                            Radiology:  Imaging Results (last 72 hours)     Procedure Component Value Units Date/Time    XR Chest 1 View [[id number]] Collected:  09/13/17 0945     Updated:  09/13/17 1137    Narrative:       EXAMINATION: XR CHEST 1 VW-09/13/2017:      INDICATION: R/O pneumonia; K92.2-Gastrointestinal hemorrhage,  unspecified; I95.9-Hypotension, unspecified; K92.0-Hematemesis;  R11.0-Nausea; R55-Syncope and collapse; K92.0-Hematemesis; D62-Acute  posthemorrhagic anemia.      COMPARISON: 04/14/2016.     FINDINGS: The heart is at the upper limits of normal. The heart is  compensated. There is no acute inflammatory process,  mass or effusion.            Impression:       No active disease.     D:  09/13/2017  E:  09/13/2017     This report was finalized on 9/13/2017 11:35 AM by Dr. Scott Samuel MD.             I personally read the radiographic studies     IMPRESSION:  1.  Cdiff colitis-It is unclear how much of a role this played in his current episode of hypotension but I doubt that it is playing a major role.  I will treat him aggressively with oral vancomycin.  I would suggest discontinuation of PPI therapy with a possible switch to Pepcid since he has C. difficile colitis and the PPI will increase his risk for persistent infection/relapse.  2.  GI bleed- AVMs cauterized, mild gastritis   3.  Hypotension-secondary to the GI bleed.  There is no current evidence of sepsis so I will discontinue his systemic antibiotic therapy.  4.  ELLEN on CPAP  5.  End-stage renal disease-on hemodialysis        PLAN/RECOMMENDATIONS:   1.  Vancomycin suspension 125 mg by mouth 4 times a day ×2 weeks at the time of discharge  2.  Spore precautions  3.  Discontinue PPI therapy-consider changing to Pepcid    Dr. Chi has obtained the history, performed the physical exam and formulated the above treatment plan.      UM/ARIANA: I discussed his situation with both Dr. Brunner and Dr. Rockwell.  I will tentatively plan for him to discharge tomorrow on vancomycin 125 mg by mouth 4 times a day ×2 weeks to be provided by [facility].  I have asked case management to contact [facility] regarding his oral vancomycin.  He has an appointment to see me in follow-up on Thursday 9/28 at 1430.           Stefan Chi MD  9/14/2017  6:19 PM

## 2017-09-14 NOTE — PROGRESS NOTES
"   LOS: 2 days    Patient Care Team:  Merry Thompson MD as PCP - General (Family Medicine)  Merry Thompson MD as Consulting Physician (Family Medicine)  Chon Vo MD as Surgeon (Neurosurgery)  Isidro Gomez MD as Consulting Physician (Cardiology)  Radha Olivo PA-C as Physician Assistant (Physician Assistant)  Santiago Cedeno MD as Consulting Physician (Anesthesiology)    Subjective     No new events    Objective     Vital Signs:  Blood pressure (!) 108/25, pulse (!) 128, temperature 98.6 °F (37 °C), temperature source Axillary, resp. rate 18, height 72\" (182.9 cm), weight (!) 335 lb (152 kg), SpO2 100 %.    Flowsheet Rows         First Filed Value    Admission Height  72\" (182.9 cm) Documented at 09/12/2017 1209    Admission Weight  (!)  335 lb (152 kg) Documented at 09/12/2017 1209          09/13 0701 - 09/14 0700  In: 8111 [I.V.:20]  Out: 89314     Physical Exam:    General Appearance: WD obese AAM NAD  Psych:   awake alert   CV:  tachy,  No edema  Lungs: respirations regular and unlabored,  clear to auscultation  Abdomen: not distended, bowel sounds present,  + tenkoff  :  No fagan, no plalp bladder  Skin: No rash, Warm and dry      Labs:    Results from last 7 days  Lab Units 09/14/17  0418 09/14/17  0010 09/13/17  1756  09/13/17  0431  09/12/17  1350   WBC 10*3/mm3 11.26*  --   --   --  8.10  --  8.91   HEMOGLOBIN g/dL 7.2* 7.3* 7.9*  < > 8.2*  8.2*  < > 8.0*   PLATELETS 10*3/mm3 145*  --   --   --  148*  --  191   < > = values in this interval not displayed.    Results from last 7 days  Lab Units 09/14/17  0418 09/13/17  0431 09/12/17  1350   SODIUM mmol/L 127* 127* 128*   POTASSIUM mmol/L 5.2 4.9 4.4   CHLORIDE mmol/L 94* 93* 88*   CO2 mmol/L 21.0 23.0 22.0   BUN mg/dL 103* 102* 89*   CREATININE mg/dL 11.20* 10.60* 10.30*   CALCIUM mg/dL 7.9* 8.1* 8.8   PHOSPHORUS mg/dL 5.3* 5.1  --    MAGNESIUM mg/dL 1.5 1.4 1.6   ALBUMIN g/dL 2.80* 2.60* 3.20       Results from last 7 " days  Lab Units 09/13/17  0431   ALK PHOS U/L 67   BILIRUBIN mg/dL 0.4   ALT (SGPT) U/L 10   AST (SGOT) U/L 13               Estimated Creatinine Clearance: 10.3 mL/min (by C-G formula based on Cr of 11.2).      A/P:       ESRD: Cont PD.  Adjust Dianeal as needed for volume.      Hyponatremia:  Can support BP with albumin if needed.  Pt typically runs on the low side.  pt tachycardic with ratie 120's due to afib.  Rate control may be beneficial.      Hyponatremia:  FW restriction.  Isotonic fluids as needed. Correct as able with PD     GI Bleed:  GI evaluating.      Anemia:  Transfuse PRN.    CDiff:  On abx.  ID managing.    Krishna Hayes MD  09/14/17  10:01 AM

## 2017-09-14 NOTE — CONSULTS
[facility] Consult Note      Patient Care Team:  Merry Thompson MD as PCP - General (Family Medicine)  Merry Thompson MD as Consulting Physician (Family Medicine)  Chon Vo MD as Surgeon (Neurosurgery)  Isidro Gomez MD as Consulting Physician (Cardiology)  Radha Olivo PA-C as Physician Assistant (Physician Assistant)  Santiago Cedeno MD as Consulting Physician (Anesthesiology)  Merry Thompson MD  No ref. provider found    Subjective     History of Present Illness:  Mr. Keys is a pleasant 62-year-old male with a history of end-stage renal disease he is maintained on peritoneal dialysis.  He also has a history of coronary artery disease and is status post stenting of the RCA from 2015.  He was admitted to [facility] on 9/12/17 after syncopal episodes.  He was found to be significantly anemic.  He is also complaining of hematemesis.  He does have atrial fibrillation and is on chronic Coumadin therapy.  Upon admission his PT was greater than 100.  We have been asked to see Mr. Ruvalcaba due to his atrial fibrillation.  He is currently denying any chest pain, shortness of breath, or palpitations.      Current Facility-Administered Medications:   •  diltiaZEM (CARDIZEM) tablet 60 mg, 60 mg, Oral, Q6H, Sunday Botello MD  •  fentaNYL citrate (PF) (SUBLIMAZE) injection 25 mcg, 25 mcg, Intravenous, Q4H PRN, Etienne Henderson APRN, 25 mcg at 09/13/17 2208  •  HYDROcodone-acetaminophen (NORCO) 7.5-325 MG per tablet 1 tablet, 1 tablet, Oral, Q4H PRN, Sunday Botello MD, 1 tablet at 09/14/17 1028  •  ipratropium-albuterol (DUO-NEB) nebulizer solution 3 mL, 3 mL, Nebulization, Q6H PRN, ERWIN King  •  metoprolol tartrate (LOPRESSOR) injection 5 mg, 5 mg, Intravenous, Q4H PRN, Alex Durán MD, 5 mg at 09/13/17 2204  •  pantoprazole (PROTONIX) EC tablet 40 mg, 40 mg, Oral, Q AM, Refugio  Isidro Og, ERWIN, 40 mg at 09/14/17 0557  •  sodium chloride 0.9 % flush 1-10 mL, 1-10 mL, Intravenous, PRN, ERWIN King  •  sodium chloride 0.9 % flush 10 mL, 10 mL, Intravenous, PRN, Yuriy Phoenix MD  •  UltraBag/Dianeal PD-2/1.5% Dex (jones #[id number]) (DIANEAL) 2,000 mL, 2,000 mL, Intraperitoneal, 5x Daily, Lissy Gomez, [facility], 2,000 mL at 09/14/17 1003  •  vancomycin oral solution 250 mg, 250 mg, Oral, Q6H, ERWIN Payne, 250 mg at 09/14/17 0557    Social History     Social History   • Marital status:      Spouse name: N/A   • Number of children: N/A   • Years of education: N/A     Occupational History   • Not on file.     Social History Main Topics   • Smoking status: Current Every Day Smoker     Packs/day: 3.00     Types: Cigars   • Smokeless tobacco: Never Used      Comment: 3 SMALL CIGARS PER DAY --STARTED CIGARS 2 YEARS AGO, STOPPED SMOKING CIGARETTES 10 YEARS   • Alcohol use Yes      Comment: RARE PER PT    • Drug use: No   • Sexual activity: Defer     Other Topics Concern   • Not on file     Social History Narrative       Family History   Problem Relation Age of Onset   • Hypertension Mother    • Hypertension Father    • Stroke Father    • Diabetes Sister    • Hypertension Sister        Review of Systems   Constitutional: Positive for fatigue.   HENT: Negative.    Eyes: Negative.    Respiratory: Negative.    Cardiovascular: Negative.    Gastrointestinal: Positive for blood in stool and nausea.   Endocrine: Negative.    Genitourinary: Positive for decreased urine volume.   Musculoskeletal: Positive for arthralgias.   Skin: Negative.    Allergic/Immunologic: Negative.    Neurological: Positive for syncope.   Hematological: Negative.    Psychiatric/Behavioral: Negative.           Objective     Vital Signs  Temp:  [98.3 °F (36.8 °C)-98.9 °F (37.2 °C)] 98.6 °F (37 °C)  Heart Rate:  [101-161] 114  Resp:  [16-20] 18  BP: ()/(25-88) 105/67      Intake/Output Summary (Last 24  hours) at 09/14/17 1058  Last data filed at 09/14/17 1028   Gross per 24 hour   Intake            30829 ml   Output            81075 ml   Net             2221 ml     I/O this shift:  In: 4445 [Other:4445]  Out: -   Physical Exam   Constitutional: He is oriented to person, place, and time. He appears well-developed and well-nourished.   HENT:   Head: Normocephalic and atraumatic.   Eyes: Conjunctivae and EOM are normal. Pupils are equal, round, and reactive to light.   Neck: Normal range of motion. Neck supple. No JVD present. No tracheal deviation present. No thyromegaly present.   Cardiovascular: Normal heart sounds.  An irregularly irregular rhythm present. Tachycardia present.  Exam reveals no gallop and no S3.    No murmur heard.  Pulmonary/Chest: Effort normal and breath sounds normal. He has no wheezes. He has no rales.   Abdominal: Soft. Bowel sounds are normal. He exhibits no distension. There is no tenderness.   Musculoskeletal: Normal range of motion. He exhibits no edema.   Neurological: He is alert and oriented to person, place, and time.   Skin: Skin is warm and dry.   Psychiatric: He has a normal mood and affect.             Results Review:    I reviewed the patient's new clinical results.    WBC WBC   Date/Time Value Ref Range Status   09/14/2017 0418 11.26 (H) 3.50 - 10.80 10*3/mm3 Final   09/13/2017 0431 8.10 3.50 - 10.80 10*3/mm3 Final   09/12/2017 1350 8.91 3.50 - 10.80 10*3/mm3 Final      HGB Hemoglobin   Date/Time Value Ref Range Status   09/14/2017 0418 7.2 (L) 13.1 - 17.5 g/dL Final   09/14/2017 0010 7.3 (L) 13.1 - 17.5 g/dL Final   09/13/2017 1756 7.9 (L) 13.1 - 17.5 g/dL Final   09/13/2017 1219 7.7 (L) 13.1 - 17.5 g/dL Final   09/13/2017 0431 8.2 (L) 13.1 - 17.5 g/dL Final   09/13/2017 0431 8.2 (L) 13.1 - 17.5 g/dL Final   09/12/2017 2111 7.6 (L) 13.1 - 17.5 g/dL Final   09/12/2017 1350 8.0 (L) 13.1 - 17.5 g/dL Final      HCT Hematocrit   Date/Time Value Ref Range Status   09/14/2017 0418  21.8 (L) 38.9 - 50.9 % Final   09/14/2017 0010 22.2 (L) 38.9 - 50.9 % Final   09/13/2017 1756 23.4 (L) 38.9 - 50.9 % Final   09/13/2017 1219 23.1 (L) 38.9 - 50.9 % Final   09/13/2017 0431 23.9 (L) 38.9 - 50.9 % Final   09/13/2017 0431 23.9 (L) 38.9 - 50.9 % Final   09/12/2017 2111 22.9 (L) 38.9 - 50.9 % Final   09/12/2017 1350 24.5 (L) 38.9 - 50.9 % Final      Platlets No results found for: LABPLAT     PT/INR:      Protime   Date/Time Value Ref Range Status   09/13/2017 0431 12.0 (H) 9.6 - 11.5 Seconds Final   09/12/2017 1659 13.0 (H) 9.6 - 11.5 Seconds Final   09/12/2017 1350 >100.0 (C) 9.6 - 11.5 Seconds Final   /  INR   Date/Time Value Ref Range Status   09/13/2017 0431 1.10  Final   09/12/2017 1659 1.19  Final   09/12/2017 1350   Final     Comment:     Unable to calculate INR due to elevated PT result.       Sodium Sodium   Date/Time Value Ref Range Status   09/14/2017 0418 127 (L) 132 - 146 mmol/L Final   09/13/2017 0431 127 (L) 132 - 146 mmol/L Final   09/12/2017 1350 128 (L) 132 - 146 mmol/L Final      Potassium Potassium   Date/Time Value Ref Range Status   09/14/2017 0418 5.2 3.5 - 5.5 mmol/L Final   09/13/2017 0431 4.9 3.5 - 5.5 mmol/L Final   09/12/2017 1350 4.4 3.5 - 5.5 mmol/L Final      Chloride Chloride   Date/Time Value Ref Range Status   09/14/2017 0418 94 (L) 99 - 109 mmol/L Final   09/13/2017 0431 93 (L) 99 - 109 mmol/L Final   09/12/2017 1350 88 (L) 99 - 109 mmol/L Final      Bicarbonate No results found for: PLASMABICARB   BUN BUN   Date/Time Value Ref Range Status   09/14/2017 0418 103 (H) 9 - 23 mg/dL Final   09/13/2017 0431 102 (H) 9 - 23 mg/dL Final   09/12/2017 1350 89 (H) 9 - 23 mg/dL Final      Creatinine Creatinine   Date/Time Value Ref Range Status   09/14/2017 0418 11.20 (H) 0.60 - 1.30 mg/dL Final   09/13/2017 0431 10.60 (H) 0.60 - 1.30 mg/dL Final   09/12/2017 1350 10.30 (H) 0.60 - 1.30 mg/dL Final      Calcium Calcium   Date/Time Value Ref Range Status   09/14/2017 0418 7.9 (L) 8.7  - 10.4 mg/dL Final   09/13/2017 0431 8.1 (L) 8.7 - 10.4 mg/dL Final   09/12/2017 1350 8.8 8.7 - 10.4 mg/dL Final      Magnesium Magnesium   Date/Time Value Ref Range Status   09/14/2017 0418 1.5 1.3 - 2.7 mg/dL Final   09/13/2017 0431 1.4 1.3 - 2.7 mg/dL Final   09/12/2017 1350 1.6 1.3 - 2.7 mg/dL Final      Troponin       No components found for: TROP                                                EKG: atrial fibrillation with RVR    Assessment/Plan   Patient Active Problem List   Diagnosis   • Peritonitis due to infected peritoneal dialysis catheter   • Lumbar stenosis with neurogenic claudication   • Lumbar facet arthropathy   • Primary osteoarthritis of both knees   • Chronic renal failure   • Morbid obesity due to excess calories   • Physical deconditioning   • Dependence on peritoneal dialysis   • Cigarette nicotine dependence without complication   • Anemia   • Hypertension   • Elevated lactic acid level   • History of Clostridium difficile   • Acute upper GI bleed   • Acute blood loss anemia   • Hematemesis without nausea   • Chronic atrial fibrillation   • ESRD (end stage renal disease)   • HX: anticoagulation   EF~50%    Hold anticoagulation  Rate control    I discussed the patients findings and my recommendations with patient and family    FERNIE Walter  09/14/17  10:58 AM

## 2017-09-14 NOTE — PROGRESS NOTES
"INTENSIVIST NOTE     Hospital Day: 2      Mr. Justin Ruvalcaba Jr., 62 y.o. male is followed for:   GI bleed        SUBJECTIVE     62-year-old male with a history of end-stage renal disease on chronic peritoneal dialysis, chronic atrial fibrillation on Coumadin, recent Clostridium difficile infection, hypertension, and coronary artery disease presented to the emergency department after multiple syncopal episodes and hematemesis.  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  He was over anticoagulated with an INR of greater than 100 which was reversed in the emergency department.    Interval history:    Rate better controlled though remains 100-120.  Chronic atrial fibrillation.  No evidence of further bleeding.  Getting peritoneal dialysis.    The patient's relevant past medical, surgical and social history were reviewed and updated in Epic as appropriate.       OBJECTIVE     Vital Sign Min/Max for last 24 hours  Temp  Min: 97.9 °F (36.6 °C)  Max: 98.9 °F (37.2 °C)   BP  Min: 91/73  Max: 122/68   Pulse  Min: 101  Max: 141   Resp  Min: 16  Max: 20   SpO2  Min: 83 %  Max: 100 %   No Data Recorded   No Data Recorded      Intake/Output Summary (Last 24 hours) at 09/14/17 1532  Last data filed at 09/14/17 1442   Gross per 24 hour   Intake            04437 ml   Output            07593 ml   Net             -734 ml      Flowsheet Rows         First Filed Value    Admission Height  72\" (182.9 cm) Documented at 09/12/2017 1209    Admission Weight  (!)  335 lb (152 kg) Documented at 09/12/2017 1209         Last 3 weights    09/12/17  1209   Weight: (!) 335 lb (152 kg)            Objective:  General Appearance:  In no acute distress and uncomfortable.    Vital signs: (most recent): Blood pressure 96/66, pulse 105, temperature 97.9 °F (36.6 °C), temperature source Oral, resp. rate 20, height 72\" (182.9 cm), weight (!) 335 lb (152 kg), SpO2 100 %.    HEENT: (Nasal cannula O2)  "   Lungs:  Normal respiratory rate and normal effort.  He is not in respiratory distress.  Breath sounds clear to auscultation.  There are decreased breath sounds.  No wheezes, rales or rhonchi.    Heart: Tachycardia.  Irregular rhythm.  S1 normal and S2 normal.  No murmur, gallop or friction rub.   Chest: Symmetric chest wall expansion.   Abdomen: Abdomen is soft and non-distended.  (Peritoneal dialysis catheter in place)Bowel sounds are normal.   There is no abdominal tenderness.   There is no mass. There is no splenomegaly. There is no hepatomegaly.   Extremities: There is dependent edema.  There is no deformity.    Neurological: Patient is alert and oriented to person, place and time.    Pupils:  Pupils are equal, round, and reactive to light.    Skin:  Warm and dry.              I reviewed the patient's new clinical results.  I reviewed the patient's new imaging results/reports including actual images and agree with reports.      Chest X-Ray:  Enlarged heart.  No pulmonary edema.    INFUSIONS         Results from last 7 days  Lab Units 09/14/17 0418 09/14/17  0010 09/13/17  1756  09/13/17  0431  09/12/17  1350   WBC 10*3/mm3 11.26*  --   --   --  8.10  --  8.91   HEMOGLOBIN g/dL 7.2* 7.3* 7.9*  < > 8.2*  8.2*  < > 8.0*   HEMATOCRIT % 21.8* 22.2* 23.4*  < > 23.9*  23.9*  < > 24.5*   PLATELETS 10*3/mm3 145*  --   --   --  148*  --  191   < > = values in this interval not displayed.    Results from last 7 days  Lab Units 09/14/17 0418 09/13/17  0431 09/12/17  1350   SODIUM mmol/L 127* 127* 128*   POTASSIUM mmol/L 5.2 4.9 4.4   CHLORIDE mmol/L 94* 93* 88*   CO2 mmol/L 21.0 23.0 22.0   BUN mg/dL 103* 102* 89*   GLUCOSE mg/dL 96 86 105*   CREATININE mg/dL 11.20* 10.60* 10.30*   MAGNESIUM mg/dL 1.5 1.4 1.6   CALCIUM mg/dL 7.9* 8.1* 8.8                 [facility] Ventilation:      I reviewed the patient's medications.    Assessment/Plan   ASSESSMENT      Hospital Problem List     * (Principal)Acute upper GI bleed     Chronic atrial fibrillation    HX: anticoagulation    History of Clostridium difficile    Overview Signed 9/12/2017  5:21 PM by ERWIN King     Treated for 10 days. Last dose about 1 week prior to admission.         Dependence on peritoneal dialysis    Cigarette nicotine dependence without complication    Anemia    Hypertension    Overview Signed 9/12/2017  5:19 PM by ERWIN King     CONTROLLED WITH MEDS PER PT          Elevated lactic acid level    Acute blood loss anemia    Overview Signed 9/13/2017  8:24 AM by Mark I Brunner, MD     Added automatically from request for surgery [id number]         Hematemesis without nausea    Overview Signed 9/13/2017  8:24 AM by Mark I Brunner, MD     Added automatically from request for surgery [id number]         ESRD (end stage renal disease)            Upper GI bleed presumably due to AVM and over anticoagulation.  Chronic anticoagulation due to chronic atrial fibrillation.  This has been reversed.  His AVM has been thermally ablated.  Peritoneal dialysis ongoing.  Heart rate improved though still elevated.          PLAN     1. Resume Cardizem in divided doses  2. I will asked Dr. Gomez to see him as decision regarding re-anticoagulation will likely need to be made at a later time.  3. Peritoneal dialysis per renal    4. Resume by mouth pain medication   5. Fluid restriction   6. Change Protonix to by mouth          I discussed the patient's findings and my recommendations with patient and nursing staff     Plan of care and goals reviewed with mulitdisciplinary team at daily rounds.    Sunday Botello MD  Pulmonary and Critical Care Medicine  09/14/17 3:32 PM

## 2017-09-15 NOTE — PROGRESS NOTES
Adult Nutrition  Assessment/PES    Patient Name:  Justin Ruvalcaba Jr.  YOB: 1955  MRN: [id number]  Admit Date:  9/12/2017    Assessment Date:  9/15/2017        Reason for Assessment       09/15/17 1346    Reason for Assessment    Reason For Assessment/Visit multidisciplinary rounds    Identified At Risk By Screening Criteria no indicators present    Time Spent (min) 30    Cardiac PAF;HTN    Hematological Anemia   transfusion rx'd              Nutrition/Diet History       09/15/17 1350    Nutrition/Diet History    Reported/Observed By RN;MD    Other Pt doing well no further evidence of GIB, H/H sl decreased - transfusion rx'd              Labs/Tests/Procedures/Meds       09/15/17 1352    Labs/Tests/Procedures/Meds    Labs/Tests Review Reviewed;BUN;Creat;K+;Phos    Medication Review Reviewed, pertinent                Nutrition Prescription Ordered       09/15/17 1353    Nutrition Prescription PO    Current PO Diet Regular    Common Modifiers Fluid Restriction;Cardiac    Fluid Restriction mL per Tray 250 mL    Fluid Restriction mL per Day 1500 mL            Evaluation of Received Nutrient/Fluid Intake       09/15/17 1358    PO Evaluation    Number of Days PO Intake Evaluated 2 days    Number of Meals 50    % PO Intake 2              Problem/Interventions:        Problem 1       09/15/17 1354    Nutrition Diagnoses Problem 1    Problem 1 Needs Alternate Composition    Macronutrient Fluid    Etiology (related to) Medical Diagnosis    Metabolic/ICU Hyponatremia    Signs/Symptoms (evidenced by) Biochemical    Specific Labs Noted Na+                    Intervention Goal       09/15/17 1354    Intervention Goal    General Nutrition support treatment    PO Maintain intake            Nutrition Intervention       09/15/17 1354    Nutrition Intervention    RD/Tech Action Follow Tx progress;Care plan reviewd            Nutrition Prescription       09/15/17 1354    Nutrition Prescription PO    PO Prescription  Begin/change diet    Common Modifiers Renal   if labs do not improve w/ daily PD exchanges    New PO Prescription Ordered? No, recommended            Education/Evaluation       09/15/17 0765    Monitor/Evaluation    Monitor Per protocol;I&O;PO intake;Pertinent labs;Symptoms        Comments:      Electronically signed by:  Sandra Wren RD  09/15/17 1:59 PM

## 2017-09-15 NOTE — PROGRESS NOTES
Adult Nutrition  Assessment/PES    Patient Name:  Justin Ruvalcaba Jr.  YOB: 1955  MRN: [id number]  Admit Date:  9/12/2017    Assessment Date:  9/15/2017        Reason for Assessment       09/15/17 1346    Reason for Assessment    Reason For Assessment/Visit multidisciplinary rounds    Identified At Risk By Screening Criteria no indicators present    Time Spent (min) 30    Cardiac PAF;HTN    Hematological Anemia   transfusion rx'd              Nutrition/Diet History       09/15/17 1350    Nutrition/Diet History    Reported/Observed By RN;MD    Other Pt doing well no further evidence of GIB, H/H sl decreased - transfusion rx'd              Labs/Tests/Procedures/Meds       09/15/17 1352    Labs/Tests/Procedures/Meds    Labs/Tests Review Reviewed;BUN;Creat;K+;Phos    Medication Review Reviewed, pertinent                Nutrition Prescription Ordered       09/15/17 1353    Nutrition Prescription PO    Current PO Diet Regular    Common Modifiers Fluid Restriction;Cardiac    Fluid Restriction mL per Tray 250 mL    Fluid Restriction mL per Day 1500 mL                Problem/Interventions:        Problem 1       09/15/17 1354    Nutrition Diagnoses Problem 1    Problem 1 Needs Alternate Composition    Macronutrient Fluid    Etiology (related to) Medical Diagnosis    Metabolic/ICU Hyponatremia    Signs/Symptoms (evidenced by) Biochemical    Specific Labs Noted Na+                    Intervention Goal       09/15/17 1354    Intervention Goal    General Nutrition support treatment    PO Maintain intake            Nutrition Intervention       09/15/17 1354    Nutrition Intervention    RD/Tech Action Follow Tx progress;Care plan reviewd            Nutrition Prescription       09/15/17 1354    Nutrition Prescription PO    PO Prescription Begin/change diet    Common Modifiers Renal   if labs do not improve w/ daily PD exchanges    New PO Prescription Ordered? No, recommended            Education/Evaluation        09/15/17 1355    Monitor/Evaluation    Monitor Per protocol;I&O;PO intake;Pertinent labs;Symptoms        Comments:      Electronically signed by:  Sandra Wren RD  09/15/17 1:56 PM

## 2017-09-15 NOTE — PROGRESS NOTES
"INTENSIVIST NOTE     Hospital Day: 3      Mr. Justin Ruvalcaba Jr., 62 y.o. male is followed for:   GI bleed        SUBJECTIVE     62-year-old male with a history of end-stage renal disease on chronic peritoneal dialysis, chronic atrial fibrillation on Coumadin, recent Clostridium difficile infection, hypertension, and coronary artery disease presented to the emergency department after multiple syncopal episodes and hematemesis.  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  He was over anticoagulated with an INR of greater than 100 which was reversed in the emergency department.    Interval history:    Atrial fibrillation rate now controlled.  Hemoglobin sagged to 6.4 this a.m. but no evidence of active bleeding.    The patient's relevant past medical, surgical and social history were reviewed and updated in Epic as appropriate.       OBJECTIVE     Vital Sign Min/Max for last 24 hours  Temp  Min: 97.1 °F (36.2 °C)  Max: 99.3 °F (37.4 °C)   BP  Min: 92/57  Max: 126/81   Pulse  Min: 79  Max: 131   Resp  Min: 16  Max: 24   SpO2  Min: 64 %  Max: 100 %   No Data Recorded   Weight  Min: 348 lb 12.3 oz (158 kg)  Max: 348 lb 12.3 oz (158 kg)      Intake/Output Summary (Last 24 hours) at 09/15/17 1717  Last data filed at 09/15/17 1545   Gross per 24 hour   Intake            48277 ml   Output            97553 ml   Net             1002 ml      Flowsheet Rows         First Filed Value    Admission Height  72\" (182.9 cm) Documented at 09/12/2017 1209    Admission Weight  (!)  335 lb (152 kg) Documented at 09/12/2017 1209         Last 3 weights    09/12/17  1209 09/15/17  0603   Weight: (!) 335 lb (152 kg) (!) 348 lb 12.3 oz (158 kg)            Objective:  General Appearance:  In no acute distress and uncomfortable.    Vital signs: (most recent): Blood pressure 121/74, pulse 92, temperature 98.3 °F (36.8 °C), temperature source Axillary, resp. rate 20, height 72\" (182.9 cm), " weight (!) 348 lb 12.3 oz (158 kg), SpO2 99 %.    HEENT: (Nasal cannula O2)    Lungs:  Normal respiratory rate and normal effort.  He is not in respiratory distress.  Breath sounds clear to auscultation.  There are decreased breath sounds.  No wheezes, rales or rhonchi.    Heart: Tachycardia.  Irregular rhythm.  S1 normal and S2 normal.  No murmur, gallop or friction rub.   Chest: Symmetric chest wall expansion.   Abdomen: Abdomen is soft and non-distended.  (Peritoneal dialysis catheter in place)Bowel sounds are normal.   There is no abdominal tenderness.   There is no mass. There is no splenomegaly. There is no hepatomegaly.   Extremities: There is dependent edema.  There is no deformity.    Neurological: Patient is alert and oriented to person, place and time.    Pupils:  Pupils are equal, round, and reactive to light.    Skin:  Warm and dry.              I reviewed the patient's new clinical results.  I reviewed the patient's new imaging results/reports including actual images and agree with reports.      Chest X-Ray:  Enlarged heart.  No pulmonary edema.    INFUSIONS         Results from last 7 days  Lab Units 09/15/17  0401 09/14/17 0418 09/14/17  0010  09/13/17  0431   WBC 10*3/mm3 10.52 11.26*  --   --  8.10   HEMOGLOBIN g/dL 6.4* 7.2* 7.3*  < > 8.2*  8.2*   HEMATOCRIT % 19.1* 21.8* 22.2*  < > 23.9*  23.9*   PLATELETS 10*3/mm3 140* 145*  --   --  148*   < > = values in this interval not displayed.    Results from last 7 days  Lab Units 09/15/17  0401 09/14/17 0418 09/13/17  0431   SODIUM mmol/L 128* 127* 127*   POTASSIUM mmol/L 5.2 5.2 4.9   CHLORIDE mmol/L 92* 94* 93*   CO2 mmol/L 22.0 21.0 23.0   BUN mg/dL 106* 103* 102*   GLUCOSE mg/dL 117* 96 86   CREATININE mg/dL 11.50* 11.20* 10.60*   MAGNESIUM mg/dL 1.5 1.5 1.4   CALCIUM mg/dL 8.4* 7.9* 8.1*                 Mech Ventilation:      I reviewed the patient's medications.    Assessment/Plan   ASSESSMENT      Hospital Problem List     * (Principal)Acute  upper GI bleed    Chronic atrial fibrillation    HX: anticoagulation    History of Clostridium difficile    Overview Signed 9/12/2017  5:21 PM by ERWIN King     Treated for 10 days. Last dose about 1 week prior to admission.         Dependence on peritoneal dialysis    Cigarette nicotine dependence without complication    Anemia    Hypertension    Overview Signed 9/12/2017  5:19 PM by ERWIN King     CONTROLLED WITH MEDS PER PT          Elevated lactic acid level    Acute blood loss anemia    Overview Signed 9/13/2017  8:24 AM by Mark I Brunner, MD     Added automatically from request for surgery [id number]         Hematemesis without nausea    Overview Signed 9/13/2017  8:24 AM by Mark I Brunner, MD     Added automatically from request for surgery [id number]         ESRD (end stage renal disease)            Upper GI bleed presumably due to AVM and over anticoagulation.  Chronic anticoagulation due to chronic atrial fibrillation.  This has been reversed.  His AVM has been thermally ablated.  Peritoneal dialysis ongoing. Atrial fibrillation rate controlled now.  Hemoglobin down to 6.4 today but I think this is chest and manifestation of his prior bleed and I don't believe there is any active bleeding.          PLAN     1. Transfuse 2 units of packed cells today  2. Cardizem for rate control  3. No anticoagulation for now on discharge and he will follow-up with Dr. Gomez  4. Peritoneal dialysis per renal    5. Resume by mouth pain medication   6. Fluid restriction   7. Change to Pepcid due to his Clostridium difficile   8. Discharge on oral vancomycin and follow up with ID as an outpatient as scheduled   9. Hopefully discharge tomorrow.  Discharge delayed for today due to need for transfusion and observation as he is a peritoneal dialysis patient and wanted to avoid fluid overload acutely.           I discussed the patient's findings and my recommendations with patient and nursing staff     Plan  of care and goals reviewed with mulitdisciplinary team at daily rounds.    Sunday Botello MD  Pulmonary and Critical Care Medicine  09/15/17 5:17 PM

## 2017-09-15 NOTE — PROGRESS NOTES
[facility]       LOS: 3 days   Patient Care Team:  Merry Thompson MD as PCP - General (Family Medicine)  Merry Thompson MD as Consulting Physician (Family Medicine)  Chon Vo MD as Surgeon (Neurosurgery)  Isidro Gomez MD as Consulting Physician (Cardiology)  Radha Olivo PA-C as Physician Assistant (Physician Assistant)  Santiago Cedeno MD as Consulting Physician (Anesthesiology)        Subjective       Patient Denies:  Cp, palps.  Breathing better.  C/o gout pain      Vital Signs  Temp:  [97.1 °F (36.2 °C)-99.1 °F (37.3 °C)] 97.1 °F (36.2 °C)  Heart Rate:  [] 106  Resp:  [16-20] 20  BP: ()/(54-87) 100/66    Intake/Output Summary (Last 24 hours) at 09/15/17 0818  Last data filed at 09/15/17 0620   Gross per 24 hour   Intake            49069 ml   Output            50226 ml   Net              815 ml          Physical Exam:     General Appearance:    Alert, cooperative, in no acute distress       Neck:   No adenopathy, supple, trachea midline, no JVD       Lungs:     Clear to auscultation,respirations regular, even and                  unlabored    Heart:    irregular rhythm and normal rate, normal S1 and S2, no            murmur, no gallop, no rub, no click   Chest Wall:    No abnormalities observed   Abdomen:     Normal bowel sounds, no masses, no organomegaly, soft        non-tender, non-distended       Extremities:   Moves all extremities well, no edema, no cyanosis, no             redness   Pulses:   Pulses palpable and equal bilaterally     Results Review:     I reviewed the patient's new clinical results.      WBC WBC   Date/Time Value Ref Range Status   09/15/2017 0401 10.52 3.50 - 10.80 10*3/mm3 Final   09/14/2017 0418 11.26 (H) 3.50 - 10.80 10*3/mm3 Final   09/13/2017 0431 8.10 3.50 - 10.80 10*3/mm3 Final   09/12/2017 1350 8.91 3.50 - 10.80 10*3/mm3 Final            HGB Hemoglobin   Date/Time Value Ref  Range Status   09/15/2017 0401 6.4 (L) 13.1 - 17.5 g/dL Final   09/14/2017 0418 7.2 (L) 13.1 - 17.5 g/dL Final   09/14/2017 0010 7.3 (L) 13.1 - 17.5 g/dL Final   09/13/2017 1756 7.9 (L) 13.1 - 17.5 g/dL Final   09/13/2017 1219 7.7 (L) 13.1 - 17.5 g/dL Final   09/13/2017 0431 8.2 (L) 13.1 - 17.5 g/dL Final   09/13/2017 0431 8.2 (L) 13.1 - 17.5 g/dL Final   09/12/2017 2111 7.6 (L) 13.1 - 17.5 g/dL Final   09/12/2017 1350 8.0 (L) 13.1 - 17.5 g/dL Final           HCT Hematocrit   Date/Time Value Ref Range Status   09/15/2017 0401 19.1 (L) 38.9 - 50.9 % Final   09/14/2017 0418 21.8 (L) 38.9 - 50.9 % Final   09/14/2017 0010 22.2 (L) 38.9 - 50.9 % Final   09/13/2017 1756 23.4 (L) 38.9 - 50.9 % Final   09/13/2017 1219 23.1 (L) 38.9 - 50.9 % Final   09/13/2017 0431 23.9 (L) 38.9 - 50.9 % Final   09/13/2017 0431 23.9 (L) 38.9 - 50.9 % Final   09/12/2017 2111 22.9 (L) 38.9 - 50.9 % Final   09/12/2017 1350 24.5 (L) 38.9 - 50.9 % Final            Platlets No results found for: LABPLAT  Sodium  Sodium   Date/Time Value Ref Range Status   09/15/2017 0401 128 (L) 132 - 146 mmol/L Final   09/14/2017 0418 127 (L) 132 - 146 mmol/L Final   09/13/2017 0431 127 (L) 132 - 146 mmol/L Final   09/12/2017 1350 128 (L) 132 - 146 mmol/L Final     Potassium  Potassium   Date/Time Value Ref Range Status   09/15/2017 0401 5.2 3.5 - 5.5 mmol/L Final   09/14/2017 0418 5.2 3.5 - 5.5 mmol/L Final   09/13/2017 0431 4.9 3.5 - 5.5 mmol/L Final   09/12/2017 1350 4.4 3.5 - 5.5 mmol/L Final     Chloride  Chloride   Date/Time Value Ref Range Status   09/15/2017 0401 92 (L) 99 - 109 mmol/L Final   09/14/2017 0418 94 (L) 99 - 109 mmol/L Final   09/13/2017 0431 93 (L) 99 - 109 mmol/L Final   09/12/2017 1350 88 (L) 99 - 109 mmol/L Final     BicarbonateNo results found for: PLASMABICARB    BUN BUN   Date/Time Value Ref Range Status   09/15/2017 0401 106 (H) 9 - 23 mg/dL Final   09/14/2017 0418 103 (H) 9 - 23 mg/dL Final   09/13/2017 0431 102 (H) 9 - 23 mg/dL  Final   09/12/2017 1350 89 (H) 9 - 23 mg/dL Final      Creatinine Creatinine   Date/Time Value Ref Range Status   09/15/2017 0401 11.50 (H) 0.60 - 1.30 mg/dL Final   09/14/2017 0418 11.20 (H) 0.60 - 1.30 mg/dL Final   09/13/2017 0431 10.60 (H) 0.60 - 1.30 mg/dL Final   09/12/2017 1350 10.30 (H) 0.60 - 1.30 mg/dL Final      Calcium Calcium   Date/Time Value Ref Range Status   09/15/2017 0401 8.4 (L) 8.7 - 10.4 mg/dL Final   09/14/2017 0418 7.9 (L) 8.7 - 10.4 mg/dL Final   09/13/2017 0431 8.1 (L) 8.7 - 10.4 mg/dL Final   09/12/2017 1350 8.8 8.7 - 10.4 mg/dL Final      Mag Magnesium   Date/Time Value Ref Range Status   09/15/2017 0401 1.5 1.3 - 2.7 mg/dL Final   09/14/2017 0418 1.5 1.3 - 2.7 mg/dL Final   09/13/2017 0431 1.4 1.3 - 2.7 mg/dL Final   09/12/2017 1350 1.6 1.3 - 2.7 mg/dL Final           PT/INR:    Protime   Date Value Ref Range Status   09/13/2017 12.0 (H) 9.6 - 11.5 Seconds Final   09/12/2017 13.0 (H) 9.6 - 11.5 Seconds Final   09/12/2017 >100.0 (C) 9.6 - 11.5 Seconds Final   /  INR   Date Value Ref Range Status   09/13/2017 1.10  Final   09/12/2017 1.19  Final   09/12/2017   Final     Comment:     Unable to calculate INR due to elevated PT result.     Troponin I   Lab Results   Component Value Date    TROPONINI <0.006 09/13/2017         diltiaZEM 60 mg Oral Q6H   pantoprazole 40 mg Oral Q AM   sevelamer 1,600 mg Oral TID With Meals   UltraBag/Dianeal PD-2/1.5% Dex (jones #[id number]) 2,000 mL Intraperitoneal 5x Daily   vancomycin 250 mg Oral Q6H          Assessment/Plan     Patient Active Problem List   Diagnosis Code   • Peritonitis due to infected peritoneal dialysis catheter T85.71XA, K65.9   • Lumbar stenosis with neurogenic claudication M48.06   • Lumbar facet arthropathy M12.88   • Primary osteoarthritis of both knees M17.0   • Chronic renal failure N18.9   • Morbid obesity due to excess calories E66.01   • Physical deconditioning R53.81   • Dependence on peritoneal dialysis Z99.2   • Cigarette  nicotine dependence without complication F17.210   • Anemia D64.9   • Hypertension I10   • Elevated lactic acid level E87.2   • History of Clostridium difficile Z87.19   • Acute upper GI bleed K92.2   • Acute blood loss anemia D62   • Hematemesis without nausea K92.0   • Chronic atrial fibrillation I48.2   • ESRD (end stage renal disease) N18.6   • HX: anticoagulation Z79.01     CV stable  Resume anticoagulation when ok with all    FERNIE Walter  09/15/17  8:18 AM

## 2017-09-15 NOTE — PROGRESS NOTES
Continued Stay Note  [facility]     Patient Name: Justin Ruvalcaba Jr.  MRN: [id number]  Today's Date: 9/15/2017    Admit Date: 9/12/2017          Discharge Plan       09/15/17 0823    Case Management/Social Work Plan    Plan Home    Additional Comments Referral received for Vancomycin po I notified Alondra in [place] she is going to work on this.              Discharge Codes     None            Lola Do RN

## 2017-09-15 NOTE — PROGRESS NOTES
"   LOS: 3 days    Patient Care Team:  Merry Thompson MD as PCP - General (Family Medicine)  Merry Thompson MD as Consulting Physician (Family Medicine)  Chon Vo MD as Surgeon (Neurosurgery)  Isidro Gomez MD as Consulting Physician (Cardiology)  Radha Olivo PA-C as Physician Assistant (Physician Assistant)  Santiago Cedeno MD as Consulting Physician (Anesthesiology)    Subjective     No new events    Objective     Vital Signs:  Blood pressure 100/66, pulse 106, temperature 97.1 °F (36.2 °C), temperature source Axillary, resp. rate 20, height 72\" (182.9 cm), weight (!) 348 lb 12.3 oz (158 kg), SpO2 99 %.    Flowsheet Rows         First Filed Value    Admission Height  72\" (182.9 cm) Documented at 09/12/2017 1209    Admission Weight  (!)  335 lb (152 kg) Documented at 09/12/2017 1209          09/14 0701 - 09/15 0700  In: 84840 [P.O.:540]  Out: 85390     Physical Exam:    General Appearance: WD obese AAM NAD  Psych:   awake alert   CV:  tachy,  No edema  Lungs: respirations regular and unlabored,  clear to auscultation  Abdomen: not distended, bowel sounds present,  + tenkoff  :  No fagan, no plalp bladder  Skin: No rash, Warm and dry      Labs:    Results from last 7 days  Lab Units 09/15/17  0401 09/14/17 0418 09/14/17  0010  09/13/17  0431   WBC 10*3/mm3 10.52 11.26*  --   --  8.10   HEMOGLOBIN g/dL 6.4* 7.2* 7.3*  < > 8.2*  8.2*   PLATELETS 10*3/mm3 140* 145*  --   --  148*   < > = values in this interval not displayed.    Results from last 7 days  Lab Units 09/15/17  0401 09/14/17  0418 09/13/17  0431 09/12/17  1350   SODIUM mmol/L 128* 127* 127* 128*   POTASSIUM mmol/L 5.2 5.2 4.9 4.4   CHLORIDE mmol/L 92* 94* 93* 88*   CO2 mmol/L 22.0 21.0 23.0 22.0   BUN mg/dL 106* 103* 102* 89*   CREATININE mg/dL 11.50* 11.20* 10.60* 10.30*   CALCIUM mg/dL 8.4* 7.9* 8.1* 8.8   PHOSPHORUS mg/dL 6.5* 5.3* 5.1  --    MAGNESIUM mg/dL 1.5 1.5 1.4 1.6   ALBUMIN g/dL 2.70* 2.80* 2.60* 3.20 "       Results from last 7 days  Lab Units 09/13/17  0431   ALK PHOS U/L 67   BILIRUBIN mg/dL 0.4   ALT (SGPT) U/L 10   AST (SGOT) U/L 13               Estimated Creatinine Clearance: 10.4 mL/min (by C-G formula based on Cr of 11.5).      A/P:       ESRD: Cont PD.  BUN elevated with Gi bleed.   Adjust Dianeal as needed for volume.      Hyponatremia:  Can support BP with albumin if needed.  Pt typically runs on the low side.  pt tachycardic with ratie 120's due to afib.  Rate control may be beneficial.      Hyponatremia:  FW restriction.  Isotonic fluids as needed. Correct as able with PD     GI Bleed:  GI evaluating.      Anemia:  Transfuse PRN.    CDiff:  On abx.  ID managing.    Krishna Hayes MD  09/15/17  9:24 AM

## 2017-09-15 NOTE — PLAN OF CARE
Problem: Patient Care Overview (Adult)  Goal: Plan of Care Review  Outcome: Ongoing (interventions implemented as appropriate)  Goal: Adult Individualization and Mutuality  Outcome: Ongoing (interventions implemented as appropriate)  Goal: Discharge Needs Assessment  Outcome: Ongoing (interventions implemented as appropriate)    Problem: Pressure Ulcer Risk (Mahesh Scale) (Adult,Obstetrics,Pediatric)  Goal: Identify Related Risk Factors and Signs and Symptoms  Outcome: Ongoing (interventions implemented as appropriate)  Goal: Skin Integrity  Outcome: Ongoing (interventions implemented as appropriate)    Problem: Gastrointestinal Bleeding (Adult)  Goal: Signs and Symptoms of Listed Potential Problems Will be Absent or Manageable (Gastrointestinal Bleeding)  Outcome: Ongoing (interventions implemented as appropriate)

## 2017-09-15 NOTE — PLAN OF CARE
Problem: Patient Care Overview (Adult)  Goal: Plan of Care Review  Outcome: Ongoing (interventions implemented as appropriate)    Problem: Pressure Ulcer Risk (Mahesh Scale) (Adult,Obstetrics,Pediatric)  Goal: Identify Related Risk Factors and Signs and Symptoms  Outcome: Ongoing (interventions implemented as appropriate)  Goal: Skin Integrity  Outcome: Ongoing (interventions implemented as appropriate)    Problem: Gastrointestinal Bleeding (Adult)  Goal: Signs and Symptoms of Listed Potential Problems Will be Absent or Manageable (Gastrointestinal Bleeding)  Outcome: Ongoing (interventions implemented as appropriate)    Problem: GI Endoscopy (Adult)  Goal: Signs and Symptoms of Listed Potential Problems Will be Absent or Manageable (GI Endoscopy)  Outcome: Outcome(s) achieved Date Met:  09/15/17

## 2017-09-15 NOTE — PROGRESS NOTES
[facility] Progress Note    Admission Date: 2017    Justin Ruvalcaba Jr.  1955  [id number]    Date: 9/15/2017    Meds:    IV Anti-Infectives     Ordered     Dose/Rate Route Frequency Start Stop    17 0939  vancomycin oral solution 250 mg     Ordering Provider:  ERWIN Payne    250 mg Oral Every 6 Hours Scheduled 17 1200      17 1844  cefepime (MAXIPIME) 1 g/100 mL 0.9% NS IVPB (mbp)     Ordering Provider:  Alex Durán MD    1 g  over 30 Minutes Intravenous Once 17 1900 17 2201          CC: Cdiff colitis       SUBJECTIVE: 17:  Patient is a 62 y.o. male followed by out service for Cdiff colitis.  He completed a course of Flagyl, and had been relatively well until yesterday, when he began having syncopal episodes. He passed out twice in our office yesterday and was sent to the ED.  He was found to have a PT of >100, positive heme stool, and had an episode of hematemesis in the ED.  He is scheduled for EGD today. He has been afebrile without leukocytosis since admission.  He states he had a diarrhea stool earlier today.   17:  No further diarrhea, had scope yesterday.  Feeling better this am  9/15/17:  Going to be transfused with 2 units today.  No diarrhea. No fever,     ROS:  No f/c/s. No n/v/d. No rash. No new ADR to Abx.     PE:   Vital Signs  Temp (24hrs), Av.5 °F (36.9 °C), Min:97.1 °F (36.2 °C), Max:99.3 °F (37.4 °C)    Temp  Min: 97.1 °F (36.2 °C)  Max: 99.3 °F (37.4 °C)  BP  Min: 92/57  Max: 126/81  Pulse  Min: 79  Max: 131  Resp  Min: 16  Max: 24  SpO2  Min: 64 %  Max: 100 %    GENERAL: Awake and alert, in no acute distress.   HEENT:  No conjunctival injection. No icterus. Oropharynx clear without evidence of thrush or exudate.  NECK: Supple, no JVD     HEART: RRR; No murmur, rubs, gallops.   LUNGS: Clear to auscultation bilaterally without wheezing, rales, rhonchi. Normal respiratory effort. Nonlabored. No dullness.  ABDOMEN: Soft,  nontender, nondistended. Positive bowel sounds. No rebound or guarding. NO mass or HSM.  EXT:  No cyanosis, clubbing or edema. No cord.  : Genitalia generally unremarkable.  Without Steele catheter.  SKIN: Warm and dry without cutaneous eruptions on Inspection/palpation.    NEURO: Alert and oriented x 3, Motor 5/5 bilaterally    Laboratory Data      Results from last 7 days  Lab Units 09/15/17  0401 09/14/17  0418 09/14/17  0010  09/13/17  0431   WBC 10*3/mm3 10.52 11.26*  --   --  8.10   HEMOGLOBIN g/dL 6.4* 7.2* 7.3*  < > 8.2*  8.2*   HEMATOCRIT % 19.1* 21.8* 22.2*  < > 23.9*  23.9*   PLATELETS 10*3/mm3 140* 145*  --   --  148*   < > = values in this interval not displayed.    Results from last 7 days  Lab Units 09/15/17  0401   SODIUM mmol/L 128*   POTASSIUM mmol/L 5.2   CHLORIDE mmol/L 92*   CO2 mmol/L 22.0   BUN mg/dL 106*   CREATININE mg/dL 11.50*   GLUCOSE mg/dL 117*   CALCIUM mg/dL 8.4*       Results from last 7 days  Lab Units 09/13/17  0431   ALK PHOS U/L 67   BILIRUBIN mg/dL 0.4   ALT (SGPT) U/L 10   AST (SGOT) U/L 13               Results from last 7 days  Lab Units 09/13/17  0431   LACTATE mmol/L 1.9             Estimated Creatinine Clearance: 10.4 mL/min (by C-G formula based on Cr of 11.5).    Microbiology:  Blood Culture   Date Value Ref Range Status   09/12/2017 No growth at 24 hours  Preliminary                            Radiology:  Imaging Results (last 72 hours)     Procedure Component Value Units Date/Time    XR Chest 1 View [[id number]] Collected:  09/13/17 0945     Updated:  09/13/17 1137    Narrative:       EXAMINATION: XR CHEST 1 VW-09/13/2017:      INDICATION: R/O pneumonia; K92.2-Gastrointestinal hemorrhage,  unspecified; I95.9-Hypotension, unspecified; K92.0-Hematemesis;  R11.0-Nausea; R55-Syncope and collapse; K92.0-Hematemesis; D62-Acute  posthemorrhagic anemia.      COMPARISON: 04/14/2016.     FINDINGS: The heart is at the upper limits of normal. The heart is  compensated. There is  no acute inflammatory process, mass or effusion.            Impression:       No active disease.     D:  09/13/2017  E:  09/13/2017     This report was finalized on 9/13/2017 11:35 AM by Dr. Scott Samuel MD.             I personally read the radiographic studies     IMPRESSION:  1.  Cdiff colitis-It is unclear how much of a role this played in his current episode of hypotension but I doubt that it is playing a major role.  I will treat him aggressively with oral vancomycin.  I would suggest discontinuation of PPI therapy with a possible switch to Pepcid since he has C. difficile colitis and the PPI will increase his risk for persistent infection/relapse.  2.  GI bleed- AVMs cauterized, mild gastritis   3.  Hypotension-secondary to the GI bleed.  There is no current evidence of sepsis so I will discontinue his systemic antibiotic therapy.  4.  ELLEN on CPAP  5.  End-stage renal disease-on hemodialysis        PLAN/RECOMMENDATIONS:   1.  Vancomycin suspension 125 mg by mouth 4 times a day ×2 weeks at the time of discharge  2.  Spore precautions  3.  Discontinue PPI therapy-consider changing to Pepcid- done    Dr. Chi has obtained the history, performed the physical exam and formulated the above treatment plan.      UM/ARIANA:  I will  plan for him to discharge  on vancomycin 125 mg by mouth 4 times a day ×2 weeks to be provided by [facility].  I have asked case management to contact [facility] regarding his oral vancomycin.  He has an appointment to see me in follow-up on Thursday 9/28 at 1430.    I discussed his case again with Dr. Moiz Chi MD  9/15/2017  5:13 PM

## 2017-09-16 PROBLEM — R79.89 ELEVATED LACTIC ACID LEVEL: Status: RESOLVED | Noted: 2017-01-01 | Resolved: 2017-01-01

## 2017-09-16 NOTE — DISCHARGE INSTR - APPOINTMENTS
[facility]:  FOLLOW UP WITH DR. TETO CURRIE ON Thursday, September 28, 2017 AT 2:30 PM.  ADDRESS:  [address], [address], [place] [address].  TELEPHONE:  [phone]

## 2017-09-16 NOTE — PROGRESS NOTES
"   LOS: 4 days    Patient Care Team:  Merry Thompson MD as PCP - General (Family Medicine)  Merry Thompson MD as Consulting Physician (Family Medicine)  Chon Vo MD as Surgeon (Neurosurgery)  Isidro Gomez MD as Consulting Physician (Cardiology)  Radha Olivo PA-C as Physician Assistant (Physician Assistant)  Santiago Cedeno MD as Consulting Physician (Anesthesiology)    Subjective     No new events    Objective     Vital Signs:  Blood pressure 117/54, pulse 101, temperature 98.8 °F (37.1 °C), temperature source Oral, resp. rate 18, height 72\" (182.9 cm), weight (!) 351 lb 3.1 oz (159 kg), SpO2 96 %.    Flowsheet Rows         First Filed Value    Admission Height  72\" (182.9 cm) Documented at 09/12/2017 1209    Admission Weight  (!)  335 lb (152 kg) Documented at 09/12/2017 1209          09/15 0701 - 09/16 0700  In: 9282 [P.O.:150]  Out: 30990     Physical Exam:    General Appearance: WD obese AAM NAD  Psych:   awake alert   CV:  tachy,  No edema  Lungs: respirations regular and unlabored,  clear to auscultation  Abdomen: not distended, bowel sounds present,  + tenkoff  :  No fagan, no plalp bladder  Skin: No rash, Warm and dry      Labs:    Results from last 7 days  Lab Units 09/16/17  0701 09/15/17  0401 09/14/17  0418   WBC 10*3/mm3 12.26* 10.52 11.26*   HEMOGLOBIN g/dL 6.3* 6.4* 7.2*   PLATELETS 10*3/mm3 95* 140* 145*       Results from last 7 days  Lab Units 09/16/17  0701 09/15/17  0401 09/14/17  0418 09/13/17  0431 09/12/17  1350   SODIUM mmol/L 128* 128* 127* 127* 128*   POTASSIUM mmol/L 4.6 5.2 5.2 4.9 4.4   CHLORIDE mmol/L 92* 92* 94* 93* 88*   CO2 mmol/L 20.0 22.0 21.0 23.0 22.0   BUN mg/dL 88* 106* 103* 102* 89*   CREATININE mg/dL 11.80* 11.50* 11.20* 10.60* 10.30*   CALCIUM mg/dL 8.6* 8.4* 7.9* 8.1* 8.8   PHOSPHORUS mg/dL 6.6* 6.5* 5.3* 5.1  --    MAGNESIUM mg/dL  --  1.5 1.5 1.4 1.6   ALBUMIN g/dL 2.90* 2.70* 2.80* 2.60* 3.20       Results from last 7 days  Lab Units " 09/13/17  0431   ALK PHOS U/L 67   BILIRUBIN mg/dL 0.4   ALT (SGPT) U/L 10   AST (SGOT) U/L 13               Estimated Creatinine Clearance: 10.1 mL/min (by C-G formula based on Cr of 11.8).      A/P:       ESRD: Cont PD.  BUN elevated with Gi bleed.   Adjust Dianeal as needed for volume.      Hyponatremia: better.   Can support BP with albumin if needed.  Pt typically runs on the low side.     Hyponatremia:  FW restriction.  Isotonic fluids as needed. Correct as able with PD     GI Bleed:  GI evaluating.      Anemia:  Transfuse PRN.    CDiff:  On abx.  ID managing.    Krishna Hayes MD  09/16/17  10:40 AM

## 2017-09-16 NOTE — PROGRESS NOTES
Intensivist Note     9/16/2017  Hospital Day: 4      Mr. Justin Ruvalcaba Jr., 62 y.o. male is followed for:  Principal Problem:    Acute upper GI bleed with hematemesis  Active Problems:    Acute blood loss anemia    Chronic atrial fibrillation on chronic Coumadin    ESRD (end stage renal disease)    Dependence on peritoneal dialysis    Hypertension    History of Clostridium difficile    Cigarette nicotine dependence without complication       SUBJECTIVE     Subjective    62-year-old  black male with end-stage renal disease on chronic peritoneal dialysis. Additional problems include chronic atrial fibrillation for which he is chronically anticoagulated on Coumadin, coronary artery disease, hypertension, recent Clostridium difficile infection. Patient was admitted 9/12/17 with hematemesis and multiple syncopal episodes. 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 Was last seen by GI on 9/15/17 (yesterday) at that time had no nausea, vomiting, bleeding but had not had a bowel movement. Recommendation at that time was to continue PPI until discharged and then switch to an H2 blocker (because of his Clostridium difficile with a persistently positive C. difficile toxin on 9/13/17). Patient has not yet had his Coumadin resumed and in fact his hematocrit yesterday was only 19.1 and he was transfused 2 units of PRBC.    He has no complaints today and in fact wants to go home. He has had no further evidence of bleeding but his hematocrit repeated today only increased from 19.1 to 19.6 after his transfusion yesterday of 2 units PRBC. Denies abdominal pain, nausea, vomiting, progressive weakness etc. He has not been noted to have any melena or hematochezia..    The patient's relevant past medical, surgical and social history were reviewed and updated in Epic as appropriate.    OBJECTIVE     /54 (BP Location: Left arm, Patient Position: Lying)  Pulse 101   "Temp 98.8 °F (37.1 °C) (Oral)   Resp 18  Ht 72\" (182.9 cm)  Wt (!) 351 lb 3.1 oz (159 kg)  SpO2 96%  BMI 47.63 kg/m2          Flowsheet Rows         First Filed Value    Admission Height  72\" (182.9 cm) Documented at 09/12/2017 1209    Admission Weight  (!)  335 lb (152 kg) Documented at 09/12/2017 1209        Intake & Output (last day)       09/15 0701 - 09/16 0700 09/16 0701 - 09/17 0700    P.O. 150 360    Blood 720     Other 8412 4050    Total Intake(mL/kg) 9282 (58.3) 4410 (27.7)    Other 99169 2450    Total Output 54804 2450    Net -1768 +1960                Objective    Exam:  General Exam:  Well-developed obese pleasant black male in NAD  HEENT: Pupils equal and reactive. Nose and throat clear.  Neck:                          Supple, no JVD, thyromegaly, or adenopathy  Lungs: Clear but diminished anteriorly and laterally.  Cardiovascular: Irregularly irregular rhythm with a variable S1 and normal S2. No murmurs gallops clicks or rubs  Abdomen: Soft nontender without organomegaly or masses. Obese. PD catheter in place.   and rectal: Deferred.  Extremities: No cyanosis or clubbing but persistent 1+ edema.  Neurologic:                 Symmetric strength. No focal deficits.    Chest X-Ray 9/16/17: Cardiomegaly with persistent bilateral congestive changes. This was seen on initial film but it is more prominent now.    INFUSIONS         Results from last 7 days  Lab Units 09/16/17  0701 09/15/17  0401 09/14/17  0418   WBC 10*3/mm3 12.26* 10.52 11.26*   HEMOGLOBIN g/dL 6.3* 6.4* 7.2*   HEMATOCRIT % 19.6* 19.1* 21.8*   PLATELETS 10*3/mm3 95* 140* 145*       Results from last 7 days  Lab Units 09/16/17  0701 09/15/17  0401   SODIUM mmol/L 128* 128*   POTASSIUM mmol/L 4.6 5.2   CHLORIDE mmol/L 92* 92*   CO2 mmol/L 20.0 22.0   BUN mg/dL 88* 106*   CREATININE mg/dL 11.80* 11.50*   GLUCOSE mg/dL 108* 117*   CALCIUM mg/dL 8.6* 8.4*       Results from last 7 days  Lab Units 09/16/17  0701 09/15/17  0401 " 09/14/17  0418 09/13/17  0431   MAGNESIUM mg/dL  --  1.5 1.5 1.4   PHOSPHORUS mg/dL 6.6* 6.5* 5.3* 5.1       No results found for: SEDRATE  Lab Results   Component Value Date    BNP 71.0 09/12/2017     Lab Results   Component Value Date    TROPONINI <0.006 09/13/2017     Lab Results   Component Value Date    TSH 3.393 11/20/2015     Lab Results   Component Value Date    LACTATE 1.9 09/13/2017     No results found for: CORTISOL    I reviewed the patient's results, images and medication.    Assessment/Plan   ASSESSMENT      Principal Problem:    Acute upper GI bleed with hematemesis  Active Problems:    Acute blood loss anemia    Chronic atrial fibrillation on chronic Coumadin    ESRD (end stage renal disease)    Dependence on peritoneal dialysis    Hypertension    History of Clostridium difficile    Cigarette nicotine dependence without complication      DISCUSSION: I see no evidence of active bleeding and patient's pressure is excellent. Not sure why his hematocrit did not respond to the 2 units of PRBC yesterday. There is been no evidence of hemolysis. We will transfuse 2 more units but he appears stable with a mechanically to transfer to the floor. We will need a follow-up hematocrit tomorrow and if does not respond to transfusion was asked GI to take a look at him again.    PLAN     1. Type and cross and transfuse 2 units PRBC  2. Recheck labs in the morning including reticulocyte count, LDH, haptoglobin  3. Transfer to telemetry and we will asked the hospitalist to assume attending role  4. If hematocrit does not respond to transfusions today would ask GI to look at him again  5. Follow-up decreased platelet count in a.m. Not sure why this should be occurring so we will ask for a lower extremity venous duplex to make sure he is not developing any DVT and sequestering platelets    Plan of care and goals reviewed with mulitdisciplinary team at daily rounds.    I discussed the patient's findings and my  recommendations with patient, family and nursing staff    Time spent Critical care 35 min (It does not include procedure time).    Robert Meneses MD  Intensive Care Medicine  09/16/17 12:11 PM

## 2017-09-16 NOTE — PROGRESS NOTES
Patient has not had BM since yesterday. No N/V. He normally takes senokot or colace for opiate-induced constipation.    >> Add senokot HS  >> Due to C. Diff, recommend stop PPI at time of discharge, and switch to H2RA.    Will sign off. Please call with questions or concerns.

## 2017-09-17 NOTE — PROGRESS NOTES
Intensivist Note     9/17/2017  Hospital Day: 5      Mr. Justin Ruvalcaba Jr., 62 y.o. male is followed for:  Principal Problem:    Acute upper GI bleed with hematemesis. AVM coagulated on endoscopy 9/13/17  Active Problems:    Acute blood loss anemia requiring multiple transfusions    Chronic atrial fibrillation on chronic Coumadin (over anticoagulated this admission)    ESRD (end stage renal disease)    Dependence on peritoneal dialysis    History of Clostridium difficile with positive C. difficile toxin this admission    Hypertension    Cigarette nicotine dependence without complication       SUBJECTIVE     Subjective    62-year-old  black male with end-stage renal disease on chronic peritoneal dialysis. Additional problems include chronic atrial fibrillation for which he is chronically anticoagulated on Coumadin, coronary artery disease, hypertension, recent Clostridium difficile infection. Patient was admitted 9/12/17 with hematemesis and multiple syncopal episodes. 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 At time of admission his pro time was greater than 100 and INR unable to be calculated. Was last seen by GI on 9/15/17 and at that time had no nausea, vomiting, bleeding but had not had a bowel movement. Recommendation at that time was to continue PPI until discharged and then switch to an H2 blocker (because of his previous Clostridium difficile infection with a persistently positive C. difficile toxin on this admission). Patients Coumadin (which was being given for his atrial fibrillation) has not been resumed was not resumed. On 9/15/17 hematocrit was 19 for which he received 2 units of PRBC. Yesterday 9/16/17 hematocrit was noted to have not responded to PRBCs and was still only 19. He received another 2 units of blood yesterday and today (9/17/17) hematocrit is 28 and he has no evidence of further GI bleeding.      He continues to  "wish to be discharged. As noted hematocrit did come up appropriately with transfusions given 9/16/17. He has however had some additional problems in that he was noted to develop oxygen desaturation last evening despite the fact that he was wearing his CPAP (4 obstructive sleep apnea) as he usually does. He has never had oxygen at home with his CPAP but last evening (9/16/17 he required 2 L to be bled into the mask. He did not have any fever, chills, purulent cough. Chest x-ray yesterday revealed evidence of congestive failure despite the fact that his echocardiogram reveals an EF of 50%. In looking at the I&O's documented by nursing he had 11.3 L in and 9.876 L out with peritoneal dialysis.     The patient's relevant past medical, surgical and social history were reviewed and updated in Epic as appropriate.    OBJECTIVE     /71  Pulse 101  Temp 99.3 °F (37.4 °C) (Axillary)   Resp 20  Ht 72\" (182.9 cm)  Wt (!) 351 lb 3.1 oz (159 kg)  SpO2 91%  BMI 47.63 kg/m2     Presently on room air    Flowsheet Rows         First Filed Value    Admission Height  72\" (182.9 cm) Documented at 09/12/2017 1209    Admission Weight  (!)  335 lb (152 kg) Documented at 09/12/2017 1209        Intake & Output (last day)       09/16 0701 - 09/17 0700 09/17 0701 - 09/18 0700    P.O. 585 60    Blood 710     Other 9981 2500    Total Intake(mL/kg) 67170 (70.8) 2560 (16.1)    Other 9876 2700    Total Output 9876 2700    Net +1400 -140                Objective    Exam:  General Exam:  Obese black male in NAD  HEENT: Pupils equal and reactive. Nose and throat clear.  Neck:                          Supple, no JVD, thyromegaly, or adenopathy  Lungs: Clear to auscultation and percussion anteriorly and posteriorly.  Cardiovascular: Irregularly irregular rhythm with variable S1 and normal S2. No murmurs gallops clicks or rubs  Abdomen: Obese soft nontender without obvious hepatosplenomegaly. PD catheter in place.   and " rectal: Deferred.  Extremities: No cyanosis or clubbing but persistent edema. 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 No focal deficits. Alert and oriented and cooperative    Chest X-Ray 9/16/17: Cardiomegaly with bilateral perihilar congestive infiltrates worse than on admission 9/13/17    Results from last 7 days  Lab Units 09/17/17  0515 09/16/17  2347 09/16/17  0701 09/15/17  0401   WBC 10*3/mm3 10.85*  --  12.26* 10.52   HEMOGLOBIN g/dL 9.2* 8.7* 6.3* 6.4*   HEMATOCRIT % 28.1* 26.3* 19.6* 19.1*   PLATELETS 10*3/mm3 173  --  95* 140*       Results from last 7 days  Lab Units 09/17/17  0515 09/16/17  0701   SODIUM mmol/L 130* 128*   POTASSIUM mmol/L 4.2 4.6   CHLORIDE mmol/L 91* 92*   CO2 mmol/L 24.0 20.0   BUN mg/dL 82* 88*   CREATININE mg/dL 10.80* 11.80*   GLUCOSE mg/dL 130* 108*   CALCIUM mg/dL 9.0 8.6*       Results from last 7 days  Lab Units 09/17/17  0515 09/16/17  0701 09/15/17  0401 09/14/17  0418   MAGNESIUM mg/dL 1.6  --  1.5 1.5   PHOSPHORUS mg/dL 6.5* 6.6* 6.5* 5.3*       No results found for: SEDRATE  Lab Results   Component Value Date    BNP 71.0 09/12/2017     Lab Results   Component Value Date    TROPONINI <0.006 09/13/2017     Lab Results   Component Value Date    TSH 3.393 11/20/2015     Lab Results   Component Value Date    LACTATE 1.9 09/13/2017     No results found for: CORTISOL      I reviewed the patient's results, images and medication.    Assessment/Plan   ASSESSMENT      Principal Problem:    Acute upper GI bleed with hematemesis. AVM coagulated on endoscopy 9/13/17  Active Problems:    Acute blood loss anemia requiring multiple transfusions    Chronic atrial fibrillation on chronic Coumadin (over anticoagulated this admission)    ESRD (end stage renal disease)    Dependence on peritoneal dialysis    History of Clostridium difficile with positive C. difficile toxin this admission    Hypertension    Cigarette nicotine dependence without  complication      DISCUSSION: Clinically doing well on my exam today, but he is definitely volume overloaded based on exam and chest x-ray. That is almost surely why he has had to have oxygen bled into his CPAP mask, why he probably will desaturate on room air when walking. I tried to make it clear to him that since essential that we pull some volume off before he is discharged or he will just be back in the emergency room. I discussed the case with Dr. Hayes and he tells me that he will look in on the patient to make sure this is done (wife has been doing his peritoneal dialysis while here as she does at home).    Regarding reinstitution of Coumadin, that decision will need to be made by cardiology. He has undergone an ablation of the AV malformation, but he required 4 units of blood since 9/15/17. We will follow-up hematocrit again tomorrow to see if it is stable. If not will ask GI to reassess his situation. At the time he was scoped 9/13/17, the AV malformation was not bleeding so I suppose it is possible that he has additional AVMs that were playing a role.    PLAN     1. Keep in the ICU  2. Nephrology will help in trying to make peritoneal dialysis more effective in pulling off volume  3. Follow-up chest x-ray and I&O's In a.m. If chest x-ray clearing and volume is being pulled off he could be discharged home  4. Recheck hematocrit in a.m. and half has dropped again, ask GI to reevaluate    Plan of care and goals reviewed with mulitdisciplinary team at daily rounds.    I discussed the patient's findings and my recommendations with family and nursing staff    Time spent Critical care 30 min (It does not include procedure time).    Robert Meneses MD  Intensive Care Medicine  09/17/17 3:18 PM

## 2017-09-17 NOTE — PLAN OF CARE
Problem: Patient Care Overview (Adult)  Goal: Plan of Care Review  Outcome: Ongoing (interventions implemented as appropriate)    09/17/17 8392   Patient Care Overview   Progress declining   Coping/Psychosocial Response Interventions   Plan Of Care Reviewed With patient;spouse   Outcome Evaluation   Outcome Summary/Follow up Plan Fluid volume overload - consulted with nephrology - wife was performing home PD schedule, now doing Q2 exchanges with 4.25%, 4 L NC and Bipap wth O2 when previously on RA. Will continue to evaluate.         Problem: Pressure Ulcer Risk (Mahesh Scale) (Adult,Obstetrics,Pediatric)  Intervention: Promote/Optimize Nutrition    09/17/17 1400   Hygiene Care Assistance   Oral Care lip lubricant applied;mouth wash rinse;oral care provided;oral rinse provided;oral swabbing       Intervention: Prevent/Manage Excess Moisture    09/17/17 0800 09/17/17 1400 09/17/17 1600   Hygiene Care Assistance   Perineal Care --  cleansed;skin sealant/barrier applied --    Hygiene Care   Bathing/Skin Care bath, chlorhexidine;bath, complete;incontinence care;linen changed;barrier wipes --  --    Skin Interventions   Skin Protection --  --  incontinence pads utilized       Intervention: Maintain Head of Bed Elevation Less Than 30 degrees as Tolerated    09/17/17 1600   Positioning   Head Of Bed (HOB) Position HOB at 60-90 degrees       Intervention: Prevent/Minimize Sheer/Friction Injuries    09/17/17 1600   Positioning   Positioning/Transfer Devices pillows;in use   Skin Interventions   Pressure Reduction Techniques frequent weight shift encouraged;heels elevated off bed;weight shift assistance provided   Pressure Reduction Devices heel offloading device utilized;positioning supports utilized;pressure-redistributing mattress utilized;specialty bed utilized       Intervention: Turn/Reposition Often    09/17/17 1600   Positioning   Positioning high Fowlers   Skin Interventions   Pressure Reduction Techniques frequent  weight shift encouraged;heels elevated off bed;weight shift assistance provided         Goal: Identify Related Risk Factors and Signs and Symptoms  Outcome: Ongoing (interventions implemented as appropriate)    09/15/17 0609   Pressure Ulcer Risk (Mahesh Scale)   Related Risk Factors (Pressure Ulcer Risk (Mahesh Scale)) body weight extremes;infection;mobility impaired       Goal: Skin Integrity  Outcome: Ongoing (interventions implemented as appropriate)    09/17/17 1742   Pressure Ulcer Risk (Mahesh Scale) (Adult,Obstetrics,Pediatric)   Skin Integrity making progress toward outcome         Problem: Gastrointestinal Bleeding (Adult)  Intervention: Monitor/Manage Gastrointestinal Bleed Characteristics/Effects    09/13/17 1300 09/15/17 2018 09/17/17 1500   Monitor/Manage Gastrointestinal Bleed Characteristics/Effects   Stool Color red, dark;black --  --    GI Signs/Symptoms --  passing flatus --    Safety Interventions   Medication Review/Management --  --  medications reviewed       Intervention: Support/Optimize Psychosocial Response to Illness    09/17/17 1200 09/17/17 1600   Coping Strategies   Family/Support System Care --  involvement promoted;presence promoted   Supportive Measures --  active listening utilized;decision-making supported;positive reinforcement provided;problem solving facilitated   Coping/Psychosocial Interventions   Environmental Support calm environment promoted --        Intervention: Provide Comfort Measures Related to Nausea/Vomiting    09/17/17 1200 09/17/17 1400   Hygiene Care Assistance   Oral Care --  lip lubricant applied;mouth wash rinse;oral care provided;oral rinse provided;oral swabbing   Coping/Psychosocial Interventions   Environmental Support calm environment promoted --        Intervention: Provide Frequent Perineal Care    09/17/17 1400 09/17/17 1600   Hygiene Care Assistance   Perineal Care cleansed;skin sealant/barrier applied --    Skin Interventions   Skin Protection --   incontinence pads utilized       Intervention: Elevate Head of Bed 30-45 Degrees    09/15/17 0609 09/17/17 1600   Safety Interventions   Aspiration Precautions awake/alert before oral intake --    Positioning   Head Of Bed (HOB) Position --  HOB at 60-90 degrees       Intervention: Monitor/Manage Fluid Electrolyte Balance    09/14/17 2000 09/17/17 1600   Positioning   Positioning --  high Fowlers   Nutrition Interventions   Fluid/Electrolyte Management fluids restricted --          Goal: Signs and Symptoms of Listed Potential Problems Will be Absent or Manageable (Gastrointestinal Bleeding)  Outcome: Ongoing (interventions implemented as appropriate)    09/15/17 0609   Gastrointestinal Bleeding   Problems Assessed (GI Bleeding) all   Problems Present (GI Bleeding) none

## 2017-09-17 NOTE — PROGRESS NOTES
"   LOS: 5 days    Patient Care Team:  Merry Thompson MD as PCP - General (Family Medicine)  Merry Thompson MD as Consulting Physician (Family Medicine)  Chon Vo MD as Surgeon (Neurosurgery)  Isidro Gomez MD as Consulting Physician (Cardiology)  Radha Olivo PA-C as Physician Assistant (Physician Assistant)  Santiago Cedeno MD as Consulting Physician (Anesthesiology)    Subjective     Edema getting worse,  More SOB    Objective     Vital Signs:  Blood pressure 112/71, pulse 101, temperature 99.3 °F (37.4 °C), temperature source Axillary, resp. rate 20, height 72\" (182.9 cm), weight (!) 351 lb 3.1 oz (159 kg), SpO2 91 %.    Flowsheet Rows         First Filed Value    Admission Height  72\" (182.9 cm) Documented at 09/12/2017 1209    Admission Weight  (!)  335 lb (152 kg) Documented at 09/12/2017 1209          09/16 0701 - 09/17 0700  In: 34819 [P.O.:585]  Out: 9876     Physical Exam:    General Appearance: WD obese AAM NAD  Psych:   awake alert   CV: irreg irreg,  + edema  Lungs: respirations regular and unlabored,  clear to auscultation  Abdomen:  Obese, bowel sounds present,  + tenkoff  :  No fagan, no plalp bladder  Skin: No rash, Warm and dry      Labs:    Results from last 7 days  Lab Units 09/17/17  0515 09/16/17  2347 09/16/17  0701 09/15/17  0401   WBC 10*3/mm3 10.85*  --  12.26* 10.52   HEMOGLOBIN g/dL 9.2* 8.7* 6.3* 6.4*   PLATELETS 10*3/mm3 173  --  95* 140*       Results from last 7 days  Lab Units 09/17/17  0515 09/16/17  0701 09/15/17  0401 09/14/17  0418 09/13/17  0431   SODIUM mmol/L 130* 128* 128* 127* 127*   POTASSIUM mmol/L 4.2 4.6 5.2 5.2 4.9   CHLORIDE mmol/L 91* 92* 92* 94* 93*   CO2 mmol/L 24.0 20.0 22.0 21.0 23.0   BUN mg/dL 82* 88* 106* 103* 102*   CREATININE mg/dL 10.80* 11.80* 11.50* 11.20* 10.60*   CALCIUM mg/dL 9.0 8.6* 8.4* 7.9* 8.1*   PHOSPHORUS mg/dL 6.5* 6.6* 6.5* 5.3* 5.1   MAGNESIUM mg/dL 1.6  --  1.5 1.5 1.4   ALBUMIN g/dL 2.90* 2.90* 2.70* 2.80* " 2.60*       Results from last 7 days  Lab Units 09/17/17  0515   ALK PHOS U/L 91   BILIRUBIN mg/dL 0.4   ALT (SGPT) U/L 17   AST (SGOT) U/L 18               Estimated Creatinine Clearance: 11 mL/min (by C-G formula based on Cr of 10.8).      A/P:       ESRD: Cont PD.  BUN elevated with Gi bleed.        Volume overload:  Was able to pull 1600mlm overnight, but pt with woresing SOB today.  Started on rapid exchange 4.25.  With this was able to pull an additional ~ 1.5 liters.  Will  Adjust Dianeal as needed for volume.      Hyponatremia: better.   Can support BP with albumin if needed.  Pt typically runs on the low side.     Hyponatremia:  FW restriction.  Isotonic fluids as needed. Correct as able with PD     GI Bleed:  GI evaluating.      Anemia: Hgb stable overnight. Transfuse PRN.    Hyperphos:  Low phos diet.  Binders with meals.     CDiff:  On abx.  ID managing.    Krishna Hayes MD  09/17/17  2:15 PM

## 2017-09-18 NOTE — PROGRESS NOTES
Continued Stay Note  [facility]     Patient Name: Justin Ruvalcaba Jr.  MRN: [id number]  Today's Date: 9/18/2017    Admit Date: 9/12/2017          Discharge Plan       09/18/17 1742    Final Note    Final Note Discharge home today.  No needs voiced.              Discharge Codes     None        Expected Discharge Date and Time     Expected Discharge Date Expected Discharge Time    Sep 18, 2017             XIN Bourne

## 2017-09-18 NOTE — PROGRESS NOTES
Adult Nutrition  Assessment/PES    Patient Name:  Justin Ruvalcaba Jr.  YOB: 1955  MRN: [id number]  Admit Date:  9/12/2017    Assessment Date:  9/18/2017    Comments:  Diet amended to include low phosphorus restriction in addition existing to Na+/ fld restrictions.      Reason for Assessment       09/18/17 1442    Reason for Assessment    Reason For Assessment/Visit follow up protocol;multidisciplinary rounds    Identified At Risk By Screening Criteria no indicators present    Time Spent (min) 30    Diagnosis --   per notes of this adm    Metabolic/ICU Hyperphosphatemia              Nutrition/Diet History       09/18/17 1448    Nutrition/Diet History    Reported/Observed By Patient;RN    Appetite Good    Other Eating well; may be ready for transfer              Labs/Tests/Procedures/Meds       09/18/17 1449    Labs/Tests/Procedures/Meds    Labs/Tests Review Phos    Medication Review Reviewed, pertinent;Other (comment)   phosphate binder rx'd     Results from last 7 days  Lab Units 09/18/17  0843 09/18/17  0225 09/17/17  0515   SODIUM mmol/L  --  132 130*   POTASSIUM mmol/L 4.3 3.7 4.2   CHLORIDE mmol/L  --  94* 91*   CO2 mmol/L  --  26.0 24.0   BUN mg/dL  --  70* 82*   CREATININE mg/dL  --  11.20* 10.80*   CALCIUM mg/dL  --  9.5 9.0   BILIRUBIN mg/dL  --   --  0.4   ALK PHOS U/L  --   --  91   ALT (SGPT) U/L  --   --  17   AST (SGOT) U/L  --   --  18   GLUCOSE mg/dL  --  155* 130*                Physical Findings       09/18/17 1450    Physical Findings/Assessment    Additional Documentation Physical Appearance (Group)    Physical Appearance    Overall Physical Appearance obese    Skin edema              Nutrition Prescription Ordered       09/18/17 1450    Nutrition Prescription PO    Current PO Diet Regular    Common Modifiers Cardiac    Fluid Restriction mL per Tray 250 mL    Fluid Restriction mL per Day 1500 mL            Evaluation of Received Nutrient/Fluid Intake       09/18/17 1451    PO  Evaluation    Number of Days PO Intake Evaluated 3 days    Number of Meals 4    % PO Intake 38              Problem/Interventions:        Problem 1       09/18/17 1451    Nutrition Diagnoses Problem 1    Problem 1 Needs Alternate Composition    Macronutrient Fluid    Micronutrient Phosphorous    Etiology (related to) Medical Diagnosis    Metabolic/ICU Hyponatremia;Hyperphosphatemia    Renal ESRD;Peritoneal dialysis    Signs/Symptoms (evidenced by) Biochemical    Labs Reviewed Done    Specific Labs Noted Phosphorus;Na+                    Intervention Goal       09/18/17 1452    Intervention Goal    General Nutrition support treatment    PO Increase intake   Pt reports good intake but this is not c/w documentation            Nutrition Intervention       09/18/17 1453    Nutrition Intervention    RD/Tech Action Advise alternate selection;Menu provided;Encourage intake;Care plan reviewd;Follow Tx progress            Nutrition Prescription       09/18/17 1453    Nutrition Prescription PO    PO Prescription Begin/change diet    Other Modifiers Low phosphorous    New PO Prescription Ordered? Yes            Education/Evaluation       09/18/17 1454    Monitor/Evaluation    Monitor Per protocol;I&O;PO intake;Pertinent labs          Electronically signed by:  Sandra Wren RD  09/18/17 2:56 PM

## 2017-09-18 NOTE — PROGRESS NOTES
[facility] Progress Note    Admission Date: 2017    Justin Ruvalcaba Jr.  1955  [id number]    Date: 2017    Meds:    IV Anti-Infectives     Ordered     Dose/Rate Route Frequency Start Stop    17 0939  vancomycin oral solution 250 mg     Ordering Provider:  ERWIN Payne    250 mg Oral Every 6 Hours Scheduled 17 1200      17 1844  cefepime (MAXIPIME) 1 g/100 mL 0.9% NS IVPB (mbp)     Ordering Provider:  Alex Durán MD    1 g  over 30 Minutes Intravenous Once 17 1900 17 2201          CC: Cdiff colitis       SUBJECTIVE: 17:  Patient is a 62 y.o. male followed by out service for Cdiff colitis.  He completed a course of Flagyl, and had been relatively well until yesterday, when he began having syncopal episodes. He passed out twice in our office yesterday and was sent to the ED.  He was found to have a PT of >100, positive heme stool, and had an episode of hematemesis in the ED.  He is scheduled for EGD today. He has been afebrile without leukocytosis since admission.  He states he had a diarrhea stool earlier today.   17:  No further diarrhea, had scope yesterday.  Feeling better this am  9/15/17:  Going to be transfused with 2 units today.  No diarrhea. No fever,   17:  Volume overloaded this weekend.  Feeling better this am.  Coughing with small amount of white sputum.  No bowel movements all weekend.     ROS:  No f/c/s. No n/v/d. No rash. No new ADR to Abx.     PE:   Vital Signs  Temp (24hrs), Av.7 °F (37.1 °C), Min:97.8 °F (36.6 °C), Max:99.3 °F (37.4 °C)    Temp  Min: 97.8 °F (36.6 °C)  Max: 99.3 °F (37.4 °C)  BP  Min: 97/68  Max: 129/75  Pulse  Min: 72  Max: 101  Resp  Min: 18  Max: 24  SpO2  Min: 83 %  Max: 98 %    GENERAL: Awake and alert, in no acute distress.   HEENT:  No conjunctival injection. No icterus. Oropharynx clear without evidence of thrush or exudate.  NECK: Supple, no JVD     HEART: RRR; No murmur, rubs, gallops.    LUNGS: few rhonchi anteriorly.   ABDOMEN: Soft, nontender, nondistended. Positive bowel sounds. No rebound or guarding. NO mass or HSM.  EXT:  No cyanosis, clubbing or edema. No cord.  : Genitalia generally unremarkable.  Without Steele catheter.  SKIN: Warm and dry without cutaneous eruptions on Inspection/palpation.    NEURO: Alert and oriented x 3, Motor 5/5 bilaterally    Laboratory Data      Results from last 7 days  Lab Units 09/18/17  0225 09/17/17  0515 09/16/17  2347 09/16/17  0701   WBC 10*3/mm3 11.37* 10.85*  --  12.26*   HEMOGLOBIN g/dL 9.0* 9.2* 8.7* 6.3*   HEMATOCRIT % 27.2* 28.1* 26.3* 19.6*   PLATELETS 10*3/mm3 184 173  --  95*       Results from last 7 days  Lab Units 09/18/17  0843 09/18/17  0225   SODIUM mmol/L  --  132   POTASSIUM mmol/L 4.3 3.7   CHLORIDE mmol/L  --  94*   CO2 mmol/L  --  26.0   BUN mg/dL  --  70*   CREATININE mg/dL  --  11.20*   GLUCOSE mg/dL  --  155*   CALCIUM mg/dL  --  9.5       Results from last 7 days  Lab Units 09/17/17  0515   ALK PHOS U/L 91   BILIRUBIN mg/dL 0.4   ALT (SGPT) U/L 17   AST (SGOT) U/L 18               Results from last 7 days  Lab Units 09/13/17  0431   LACTATE mmol/L 1.9             Estimated Creatinine Clearance: 10.6 mL/min (by C-G formula based on Cr of 11.2).    Microbiology:  Blood Culture   Date Value Ref Range Status   09/12/2017 No growth at 24 hours  Preliminary                            Radiology:  Imaging Results (last 72 hours)     Procedure Component Value Units Date/Time    XR Chest 1 View [[id number]] Collected:  09/13/17 0945     Updated:  09/13/17 1137    Narrative:       EXAMINATION: XR CHEST 1 VW-09/13/2017:      INDICATION: R/O pneumonia; K92.2-Gastrointestinal hemorrhage,  unspecified; I95.9-Hypotension, unspecified; K92.0-Hematemesis;  R11.0-Nausea; R55-Syncope and collapse; K92.0-Hematemesis; D62-Acute  posthemorrhagic anemia.      COMPARISON: 04/14/2016.     FINDINGS: The heart is at the upper limits of normal. The heart  is  compensated. There is no acute inflammatory process, mass or effusion.            Impression:       No active disease.     D:  09/13/2017  E:  09/13/2017     This report was finalized on 9/13/2017 11:35 AM by Dr. Scott Samuel MD.             I personally read the radiographic studies     IMPRESSION:  1.  Cdiff colitis-It is unclear how much of a role this played in his current episode of hypotension but I doubt that it is playing a major role.  I will treat him aggressively with oral vancomycin.  I would suggest discontinuation of PPI therapy with a possible switch to Pepcid since he has C. difficile colitis and the PPI will increase his risk for persistent infection/relapse.  2.  GI bleed- AVMs cauterized, mild gastritis   3.  Hypotension-secondary to the GI bleed.  There is no current evidence of sepsis so I will discontinue his systemic antibiotic therapy.  4.  ELLEN on CPAP  5.  End-stage renal disease-on hemodialysis        PLAN/RECOMMENDATIONS:   1.  Vancomycin suspension 125 mg by mouth 4 times a day ×2 weeks at the time of discharge  2.  Spore precautions  3.  Discontinue PPI therapy-consider changing to Pepcid- done    Dr. Chi has obtained the history, performed the physical exam and formulated the above treatment plan.      UM/ARIANA:  I will  plan for him to discharge  on vancomycin 125 mg by mouth 4 times a day ×2 weeks to be provided by [facility].  I have asked case management to contact [facility] regarding his oral vancomycin.  He has an appointment to see me in follow-up on Thursday 9/28 at 1430.               Merry Zhou, ERWIN  9/18/2017  10:35 AM

## 2017-09-18 NOTE — DISCHARGE SUMMARY
"Discharge Summary    Patient name: Justin Ruvalcaba Jr.  CSN: [id number]  MRN: [id number]  : 1955  Today's date: 2017     Date of Admission: 2017  Date of Discharge:  2017    Admitting Physician:  WILL Botello MD  Primary Care Provider: Merry Thompson MD  Consultations:   Dr. Gomez- Cardiology  Dr. Brunner- Gastroenterology  Dr. Chi- ID  Dr. Hayes- Nephrology    Admission Diagnosis:   Principal Problem:    Acute upper GI bleed with hematemesis. AVM coagulated on endoscopy 17  Active Problems:    Dependence on peritoneal dialysis    Hypertension    History of Clostridium difficile with positive C. difficile toxin this admission    Cigarette nicotine dependence without complication    Acute blood loss anemia requiring multiple transfusions    Chronic atrial fibrillation on chronic Coumadin (over anticoagulated this admission)    ESRD (end stage renal disease)     Discharge Diagnoses:   Principal Problem:    Acute upper GI bleed with hematemesis. AVM coagulated on endoscopy 17  Active Problems:    Dependence on peritoneal dialysis    Hypertension    History of Clostridium difficile with positive C. difficile toxin this admission    Cigarette nicotine dependence without complication    Acute blood loss anemia requiring multiple transfusions    Chronic atrial fibrillation on chronic Coumadin (over anticoagulated this admission)    ESRD (end stage renal disease)    Procedures:  Procedure(s):  ESOPHAGOGASTRODUODENOSCOPY       History of Present Illness:  Justin Ruvalcaba Jr. is a 62 y.o. male, present smoker, who presents to emergency department after experiencing multiple syncopal episodes today.  Patient reports \"passing out\" this morning while sitting on the toilet.  He further experienced 2 more episodes while visiting Dr. Rankin with infectious disease for follow-up after recent treatment for Clostridium difficile colitis.  Patient reports that he finished 10 days worth of " antibiotics approximately 1 week ago and has not had recurrent diarrhea.  Further the patient has a history of multiple comorbidities including atrial fibrillation, hypertension, chronic kidney disease, coronary artery disease.  He is on chronic peritoneal dialysis, and also receives Coumadin therapy.  His PT and INR were quite high and his PT was greater than 100.  Patient uses an aspirin daily.  Hemoglobin was 8 today.  He is experienced multiple episodes of nausea with hematemesis.  Further he has been found to have positive guaiac, with maroon/bloody stool.  Patient denies recent history of fever/chills, chest pain/shortness of air, abdominal pain, and denies frequent use of alcohol, or illicit drug use.  He is not using other NSAIDs except for aspirin and denies use specifically of ibuprofen and naproxen.  His cardiologist is Dr. Gomez, his nephrologist is Dr. Norris.  His wife states that he has been going through this particular episode of not feeling well for the past several weeks.    Hospital Course:  Patient was admitted to ICU as discussed in history of present illness.  Gastroenterology, nephrology, and cardiology were asked to see the patient in consult.  His INR was found to be greater than 100 and was reversed in the emergency department.  Patient was found to be infected with Clostridium difficile/ colitis and infectious disease began treatment with oral vancomycin.  He underwent endoscopy (EGD) which showed mild gastritis and a nonbleeding AVM which was ablated and endoclipped in the duodenal bulb.  Peritoneal dialysis was started per his routine by nephrology.  Hospital day #1 his INR was noted to be normal and his blood pressure was marginal.  His atrial fibrillation was controlled by hospital day #2 but he was found to have a decreased hemoglobin to 6.4 without evidence of active bleeding.  He remained stable thereafter and by hospital day #6 deemed to have reached maximal therapy and is  "able to be discharged home.  The patient has ambulated with assistance up to chair and to and from his chair to the restroom several times.  He will need to continue to ambulate with assistance and a walker with wheels has been ordered for him.  At present he is to remain off anticoagulants with follow-ups with primary care, nephrology, cardiology, and gastroenterology within the next few weeks.  At those times discussion should be made for restarting his anticoagulation.  Oral vancomycin has already been scheduled through infectious disease, Dr. Chi and infusion partners.     Vitals:  /68 (BP Location: Left arm, Patient Position: Sitting)  Pulse 81  Temp 99 °F (37.2 °C) (Axillary)   Resp 24  Ht 72\" (182.9 cm)  Wt (!) 351 lb 3.1 oz (159 kg)  SpO2 97%  BMI 47.63 kg/m2    Physical Exam:  Constitutional:  Appears well-developed and well-nourished. No distress. Obese.  HEENT:  Normocephalic and atraumatic. PERRL  Neck:  Neck supple. No JVD present.   CV: Normal rate, regular rhythm,  intact distal pulses.  No gallop, murmur or rub.  Pulmonary/Chest: Effort normal and breath sounds normal. No respiratory distress. No wheezes, rhonchi or rales.   Abdominal: Soft. +BS. No distension and no mass. There is no tenderness.   Musculoskeletal: Normal muscle tone and strength  Neurological: Alert and oriented to person, place, and time.  No focal deficits  Skin: Skin is warm and dry. No rash noted.   Extremities:  No clubbing, edema or cyanosis  Psychiatric: Normal mood and affect. Behavior is normal.     Labs:    Results from last 7 days  Lab Units 09/18/17  0225   WBC 10*3/mm3 11.37*   HEMOGLOBIN g/dL 9.0*   HEMATOCRIT % 27.2*   PLATELETS 10*3/mm3 184       Results from last 7 days  Lab Units 09/18/17  0843 09/18/17  0225 09/17/17  0515   SODIUM mmol/L  --  132 130*   POTASSIUM mmol/L 4.3 3.7 4.2   CHLORIDE mmol/L  --  94* 91*   CO2 mmol/L  --  26.0 24.0   BUN mg/dL  --  70* 82*   CREATININE mg/dL  --  11.20* " 10.80*   CALCIUM mg/dL  --  9.5 9.0   BILIRUBIN mg/dL  --   --  0.4   ALK PHOS U/L  --   --  91   ALT (SGPT) U/L  --   --  17   AST (SGOT) U/L  --   --  18   GLUCOSE mg/dL  --  155* 130*         Magnesium   Date Value Ref Range Status   09/18/2017 1.6 1.3 - 2.7 mg/dL Final   09/17/2017 1.6 1.3 - 2.7 mg/dL Final     Phosphorus   Date Value Ref Range Status   09/18/2017 6.2 (H) 2.4 - 5.1 mg/dL Final   09/17/2017 6.5 (H) 2.4 - 5.1 mg/dL Final   09/16/2017 6.6 (H) 2.4 - 5.1 mg/dL Final               Discharge Medications:   Justin Ruvalcaba Jr.   Home Medication Instructions MYRON:[id number]    Printed on:09/18/17 5353   Medication Information                      ANORO ELLIPTA 62.5-25 MCG/INH aerosol powder   Take 1 puff by mouth Daily.             carvedilol (COREG) 3.125 MG tablet  Take 3.125 mg by mouth 2 (Two) Times a Day.             diltiaZEM (CARDIZEM) 60 MG tablet  Take 1 tablet by mouth Every 6 (Six) Hours.             famotidine (PEPCID) 20 MG tablet  Take 1 tablet by mouth 2 (Two) Times a Day.             HYDROcodone-acetaminophen (NORCO)  MG per tablet  Take 1 tablet by mouth Every 4 (Four) Hours As Needed for moderate pain (4-6).             nitroglycerin (NITROSTAT) 0.4 MG SL tablet  Place 0.4 mg under the tongue Every 5 (Five) Minutes As Needed for chest pain.             sennosides-docusate sodium (SENOKOT-S) 8.6-50 MG tablet  Take 2 tablets by mouth Every Night.             sevelamer (RENVELA) 800 MG tablet  Take 1,600 mg by mouth 3 (Three) Times a Day With Meals.                 Discharge Diet:   Diet Instructions     Diet: Consistent Carbohydrate, Renal; Thin       Discharge Diet:   Consistent Carbohydrate  Renal      Fluid Consistency:  Thin               Continue on a renal healthy diet with a 1500 mL fluid restriction daily.                  Activity at Discharge:    Activity Instructions     Activity as Tolerated       With assistance.           Additional Activity Instructions:      You may  advance your activity as tolerated.  Try to exercise regularly as tolerated.                  Follow-up Appointments  No future appointments.  Additional Instructions for the Follow-ups that You Need to Schedule     Additional Discharge Follow-Up (Specify Provider)    As directed    To:  Dr. Gomez- Cardiology   Follow Up:  1 Week       Additional Discharge Follow-Up (Specify Provider)    As directed    To:  Gastroenterology   Follow Up:  2 Weeks   Follow Up Details:  Post GI Bleed, consideration for anticoagulation.       Discharge Follow-up with PCP    As directed    Follow Up Details:  ASAP for transition of care.       Discharge Follow-up with Specialty    As directed    Specialty:  Nephrology   Follow Up:  1 Week   Follow Up Details:  On Peritoneal dialysis                 Discharge Instructions:  Home with assistance  Continue oral vancomycin per infectious disease  Tolerating oral intake, continue renal/cardiac diet  Follow-up as noted above with primary care, nephrology, cardiology, infectious disease         ERWIN King, ACNP-BC  Pulmonary & Critical Care Medicine  09/18/17 2:51 PM    Time: Discharge 45 min    CC: Merry Thompson MD         [unfilled]    *Please note that portions of this note were completed with a voice recognition program. Efforts were made to edit the dictations, but occasionally words are mistranscribed.

## 2017-09-18 NOTE — PROGRESS NOTES
[facility]       LOS: 6 days   Patient Care Team:  Merry Thompson MD as PCP - General (Family Medicine)  Merry Thompson MD as Consulting Physician (Family Medicine)  Chon Vo MD as Surgeon (Neurosurgery)  Isidro Gomez MD as Consulting Physician (Cardiology)  Radha Olivo PA-C as Physician Assistant (Physician Assistant)  Santiago Cedeno MD as Consulting Physician (Anesthesiology)        Subjective       Patient Denies:  Cp, palps.  Breathing better.        Vital Signs  Temp:  [97.8 °F (36.6 °C)-99.3 °F (37.4 °C)] 99 °F (37.2 °C)  Heart Rate:  [] 97  Resp:  [18-24] 18  BP: ()/(48-86) 98/55    Intake/Output Summary (Last 24 hours) at 09/18/17 1010  Last data filed at 09/18/17 0600   Gross per 24 hour   Intake             8285 ml   Output             9839 ml   Net            -1554 ml          Physical Exam:     General Appearance:    Alert, cooperative, in no acute distress       Neck:   No adenopathy, supple, trachea midline, no JVD       Lungs:     wheezes to auscultation,respirations regular, even and                  unlabored    Heart:    irregular rhythm and normal rate, normal S1 and S2, no            murmur, no gallop, no rub, no click   Chest Wall:    No abnormalities observed   Abdomen:     Normal bowel sounds, no masses, no organomegaly, soft        non-tender, non-distended       Extremities:   Moves all extremities well, no edema, no cyanosis, no             redness   Pulses:   Pulses palpable and equal bilaterally     Results Review:     I reviewed the patient's new clinical results.      WBC WBC   Date/Time Value Ref Range Status   09/18/2017 0225 11.37 (H) 3.50 - 10.80 10*3/mm3 Final   09/17/2017 0515 10.85 (H) 3.50 - 10.80 10*3/mm3 Final   09/16/2017 0701 12.26 (H) 3.50 - 10.80 10*3/mm3 Final            HGB Hemoglobin   Date/Time Value Ref Range Status   09/18/2017 0225 9.0 (L) 13.1 - 17.5 g/dL  Final   09/17/2017 0515 9.2 (L) 13.1 - 17.5 g/dL Final   09/16/2017 2347 8.7 (L) 13.1 - 17.5 g/dL Final   09/16/2017 0701 6.3 (L) 13.1 - 17.5 g/dL Final           HCT Hematocrit   Date/Time Value Ref Range Status   09/18/2017 0225 27.2 (L) 38.9 - 50.9 % Final   09/17/2017 0515 28.1 (L) 38.9 - 50.9 % Final   09/16/2017 2347 26.3 (L) 38.9 - 50.9 % Final   09/16/2017 0701 19.6 (L) 38.9 - 50.9 % Final            Platlets No results found for: LABPLAT  Sodium  Sodium   Date/Time Value Ref Range Status   09/18/2017 0225 132 132 - 146 mmol/L Final   09/17/2017 0515 130 (L) 132 - 146 mmol/L Final   09/16/2017 0701 128 (L) 132 - 146 mmol/L Final     Potassium  Potassium   Date/Time Value Ref Range Status   09/18/2017 0843 4.3 3.5 - 5.5 mmol/L Final   09/18/2017 0225 3.7 3.5 - 5.5 mmol/L Final   09/17/2017 0515 4.2 3.5 - 5.5 mmol/L Final   09/16/2017 0701 4.6 3.5 - 5.5 mmol/L Final     Chloride  Chloride   Date/Time Value Ref Range Status   09/18/2017 0225 94 (L) 99 - 109 mmol/L Final   09/17/2017 0515 91 (L) 99 - 109 mmol/L Final   09/16/2017 0701 92 (L) 99 - 109 mmol/L Final     BicarbonateNo results found for: PLASMABICARB    BUN BUN   Date/Time Value Ref Range Status   09/18/2017 0225 70 (H) 9 - 23 mg/dL Final   09/17/2017 0515 82 (H) 9 - 23 mg/dL Final   09/16/2017 0701 88 (H) 9 - 23 mg/dL Final      Creatinine Creatinine   Date/Time Value Ref Range Status   09/18/2017 0225 11.20 (H) 0.60 - 1.30 mg/dL Final   09/17/2017 0515 10.80 (H) 0.60 - 1.30 mg/dL Final   09/16/2017 0701 11.80 (H) 0.60 - 1.30 mg/dL Final      Calcium Calcium   Date/Time Value Ref Range Status   09/18/2017 0225 9.5 8.7 - 10.4 mg/dL Final   09/17/2017 0515 9.0 8.7 - 10.4 mg/dL Final   09/16/2017 0701 8.6 (L) 8.7 - 10.4 mg/dL Final      Mag Magnesium   Date/Time Value Ref Range Status   09/18/2017 0225 1.6 1.3 - 2.7 mg/dL Final   09/17/2017 0515 1.6 1.3 - 2.7 mg/dL Final           PT/INR:    No results found for: PROTIME/  No results found for:  INR  Troponin I     Lab Results   Component Value Date    TROPONINI 0.137 (H) 09/18/2017         diltiaZEM 60 mg Oral Q6H   famotidine 20 mg Oral Daily   sennosides-docusate sodium 2 tablet Oral Nightly   sevelamer 1,600 mg Oral TID With Meals   vancomycin 250 mg Oral Q6H          Assessment/Plan     Patient Active Problem List   Diagnosis Code   • Peritonitis due to infected peritoneal dialysis catheter T85.71XA, K65.9   • Lumbar stenosis with neurogenic claudication M48.06   • Lumbar facet arthropathy M12.88   • Primary osteoarthritis of both knees M17.0   • Chronic renal failure N18.9   • Morbid obesity due to excess calories E66.01   • Physical deconditioning R53.81   • Dependence on peritoneal dialysis Z99.2   • Cigarette nicotine dependence without complication F17.210   • Anemia D64.9   • Hypertension I10   • History of Clostridium difficile with positive C. difficile toxin this admission Z87.19   • Acute upper GI bleed with hematemesis. AVM coagulated on endoscopy 9/13/17 K92.2   • Acute blood loss anemia requiring multiple transfusions D62   • Hematemesis without nausea K92.0   • Chronic atrial fibrillation on chronic Coumadin (over anticoagulated this admission) I48.2   • ESRD (end stage renal disease) N18.6   • HX: anticoagulation Z79.01     CV stable  Resume anticoagulation when ok with all  To tele    FERNIE Walter  09/18/17  10:10 AM

## 2017-09-18 NOTE — PROGRESS NOTES
"   LOS: 6 days    Patient Care Team:  Merry Thompson MD as PCP - General (Family Medicine)  Merry Thompson MD as Consulting Physician (Family Medicine)  Chon Vo MD as Surgeon (Neurosurgery)  Isidro Gomez MD as Consulting Physician (Cardiology)  Radha Olivo PA-C as Physician Assistant (Physician Assistant)  Santiago Cedeno MD as Consulting Physician (Anesthesiology)    Subjective     Better today    Objective     Vital Signs:  Blood pressure 98/55, pulse 97, temperature 99 °F (37.2 °C), temperature source Axillary, resp. rate 18, height 72\" (182.9 cm), weight (!) 351 lb 3.1 oz (159 kg), SpO2 91 %.    Flowsheet Rows         First Filed Value    Admission Height  72\" (182.9 cm) Documented at 09/12/2017 1209    Admission Weight  (!)  335 lb (152 kg) Documented at 09/12/2017 1209          09/17 0701 - 09/18 0700  In: 8345 [P.O.:295; I.V.:50]  Out: 65252     Physical Exam:    General Appearance: WD obese AAM NAD  Psych:   awake alert   CV: irreg irreg,  + edema  Lungs: respirations regular and unlabored,  clear to auscultation  Abdomen:  Obese, bowel sounds present,  + tenkoff  :  No fagan, no plalp bladder  Skin: No rash, Warm and dry      Labs:    Results from last 7 days  Lab Units 09/18/17  0225 09/17/17  0515 09/16/17  2347 09/16/17  0701   WBC 10*3/mm3 11.37* 10.85*  --  12.26*   HEMOGLOBIN g/dL 9.0* 9.2* 8.7* 6.3*   PLATELETS 10*3/mm3 184 173  --  95*       Results from last 7 days  Lab Units 09/18/17  0843 09/18/17  0225 09/17/17  0515 09/16/17  0701 09/15/17  0401 09/14/17  0418   SODIUM mmol/L  --  132 130* 128* 128* 127*   POTASSIUM mmol/L 4.3 3.7 4.2 4.6 5.2 5.2   CHLORIDE mmol/L  --  94* 91* 92* 92* 94*   CO2 mmol/L  --  26.0 24.0 20.0 22.0 21.0   BUN mg/dL  --  70* 82* 88* 106* 103*   CREATININE mg/dL  --  11.20* 10.80* 11.80* 11.50* 11.20*   CALCIUM mg/dL  --  9.5 9.0 8.6* 8.4* 7.9*   PHOSPHORUS mg/dL  --  6.2* 6.5* 6.6* 6.5* 5.3*   MAGNESIUM mg/dL  --  1.6 1.6  --  1.5 " 1.5   ALBUMIN g/dL  --   --  2.90* 2.90* 2.70* 2.80*       Results from last 7 days  Lab Units 09/17/17  0515   ALK PHOS U/L 91   BILIRUBIN mg/dL 0.4   ALT (SGPT) U/L 17   AST (SGOT) U/L 18               Estimated Creatinine Clearance: 10.6 mL/min (by C-G formula based on Cr of 11.2).      A/P:       ESRD: Cont PD.  BUN elevated with Gi bleed.        Volume overload:  Much better.  Neg ~ 6 liters since yesterday.  breathng better.  Edema better.      Hyponatremia: better.   Can support BP with albumin if needed.  Pt typically runs on the low side.     Hyponatremia:  FW restriction.  Isotonic fluids as needed. Correct as able with PD     GI Bleed:  GI evaluating.      Anemia: Hgb stable overnight. Transfuse PRN.    Hyperphos:  Low phos diet.  Binders with meals.     CDiff:  On abx.  ID managing.    Home anytime from a renal standpoint    Krishna Hayes MD  09/18/17  10:35 AM

## 2017-09-18 NOTE — PLAN OF CARE
Problem: Patient Care Overview (Adult)  Goal: Plan of Care Review  Outcome: Ongoing (interventions implemented as appropriate)    09/17/17 1742 09/17/17 2000   Patient Care Overview   Progress declining --    Coping/Psychosocial Response Interventions   Plan Of Care Reviewed With --  patient;spouse   Outcome Evaluation   Outcome Summary/Follow up Plan Fluid volume overload - consulted with nephrology - wife was performing home PD schedule, now doing Q2 exchanges with 4.25%, 4 L NC and Bipap wth O2 when previously on RA. Will continue to evaluate. --        Goal: Adult Individualization and Mutuality  Outcome: Ongoing (interventions implemented as appropriate)    Problem: Pressure Ulcer Risk (Mahesh Scale) (Adult,Obstetrics,Pediatric)  Goal: Identify Related Risk Factors and Signs and Symptoms  Outcome: Ongoing (interventions implemented as appropriate)  Goal: Skin Integrity  Outcome: Ongoing (interventions implemented as appropriate)    Problem: Gastrointestinal Bleeding (Adult)  Goal: Signs and Symptoms of Listed Potential Problems Will be Absent or Manageable (Gastrointestinal Bleeding)  Outcome: Ongoing (interventions implemented as appropriate)

## 2017-09-19 NOTE — PROGRESS NOTES
Continued Stay Note  [facility]     Patient Name: Justin Ruvalcaba Jr.  MRN: [id number]  Today's Date: 9/19/2017    Admit Date: 9/12/2017          Discharge Plan       09/19/17 1055    Case Management/Social Work Plan    Additional Comments Rolling walker ordered through [employer] today, to be delivered to home.  Discussed with spouse, (Ana Paula Ruvalcaba, [phone]).  Shellie Dudley, Ext. [phone]              Discharge Codes     None        Expected Discharge Date and Time     Expected Discharge Date Expected Discharge Time    Sep 18, 2017             XIN Bourne

## 2017-09-25 PROBLEM — J44.9 COPD (CHRONIC OBSTRUCTIVE PULMONARY DISEASE) (HCC): Chronic | Status: ACTIVE | Noted: 2017-01-01

## 2017-09-25 PROBLEM — I48.20 CHRONIC ATRIAL FIBRILLATION (HCC): Chronic | Status: ACTIVE | Noted: 2017-01-01

## 2017-09-25 PROBLEM — Z86.19 HISTORY OF CLOSTRIDIUM DIFFICILE INFECTION: Status: ACTIVE | Noted: 2017-01-01

## 2017-09-25 PROBLEM — I95.9 SYMPTOMATIC HYPOTENSION: Status: ACTIVE | Noted: 2017-01-01

## 2017-09-25 PROBLEM — D72.829 LEUKOCYTOSIS: Status: ACTIVE | Noted: 2017-01-01

## 2017-09-25 PROBLEM — G89.29 CHRONIC PAIN: Chronic | Status: ACTIVE | Noted: 2017-01-01

## 2017-09-25 PROBLEM — R53.1 WEAKNESS: Status: ACTIVE | Noted: 2017-01-01

## 2017-09-25 PROBLEM — Z87.19 HISTORY OF GASTROINTESTINAL BLEEDING: Status: ACTIVE | Noted: 2017-01-01

## 2017-09-25 PROBLEM — I95.9 HYPOTENSION: Status: ACTIVE | Noted: 2017-01-01

## 2017-09-25 PROBLEM — N18.6 ESRD (END STAGE RENAL DISEASE) (HCC): Chronic | Status: ACTIVE | Noted: 2017-01-01

## 2017-09-25 PROBLEM — R06.09 DOE (DYSPNEA ON EXERTION): Status: ACTIVE | Noted: 2017-01-01

## 2017-09-25 PROBLEM — G47.33 OSA (OBSTRUCTIVE SLEEP APNEA): Chronic | Status: ACTIVE | Noted: 2017-01-01

## 2017-09-28 PROBLEM — E87.1 HYPONATREMIA: Status: ACTIVE | Noted: 2017-01-01

## 2017-09-29 PROBLEM — I95.9 SYMPTOMATIC HYPOTENSION: Status: RESOLVED | Noted: 2017-01-01 | Resolved: 2017-01-01

## 2017-09-29 PROBLEM — D72.829 LEUKOCYTOSIS: Status: RESOLVED | Noted: 2017-01-01 | Resolved: 2017-01-01

## 2017-09-29 PROBLEM — R53.1 WEAKNESS: Status: RESOLVED | Noted: 2017-01-01 | Resolved: 2017-01-01

## 2017-10-04 NOTE — PROGRESS NOTES
"PCP: Merry Thompson MD    Chief Complaint   Patient presents with   • Follow-up     Hosp       History of Present Illness:   HPI  Mr. Ruvalcaba returns for a follow-up visit.  The patient is doing well at this time without signs of gastrointestinal bleeding.  The patient denies any heartburn or abdominal pain with meals.  There is no history of nausea or vomiting.  He denies any change in bowel habits.  The patient is doing peritoneal dialysis daily.  Past Medical History:   Diagnosis Date   • Anemia    • Arthritis    • Back pain    • Chronic kidney disease    • Chronic pain 9/25/2017   • COPD (chronic obstructive pulmonary disease)    • Coronary artery disease     ONE CORONARY STENT PLACED 11/2015   • CPAP (continuous positive airway pressure) dependence    • SHARP (dyspnea on exertion) 9/25/2017   • Hypertension     CONTROLLED WITH MEDS PER PT    • Hypotension 9/25/2017   • ELLEN (obstructive sleep apnea) 9/25/2017   • Patient on peritoneal dialysis     8 HOURS AT HS    • Sleep apnea    • Weakness 9/25/2017   • Wears dentures     FULL PLATE ON TOP PARTIAL PLATE ON BOTTOM    • Wears glasses        Past Surgical History:   Procedure Laterality Date   • ARTERIOVENOUS FISTULA      LUE-FOR DIALYSIS PATIENT STATES \"I NEVER USED IT SO IT CLOSED UP\"    • BACK SURGERY     • CARDIAC CATHETERIZATION  11/2015    ONE CORONARY STENT PLACED    • COLONOSCOPY  2013   • ENDOSCOPY N/A 9/13/2017    Procedure: ESOPHAGOGASTRODUODENOSCOPY;  Surgeon: Mark I Brunner, MD;  Location: [facility] ENDOSCOPY;  Service:    • INSERTION PERITONEAL DIALYSIS CATHETER     • PARATHYROIDECTOMY     • SPINAL CORD STIMULATOR IMPLANT N/A 12/22/2016    Procedure: SPINAL CORD STIMULATOR INSERTION;  Surgeon: Chon Vo MD;  Location: [facility] the [facility] OR;  Service:          Current Outpatient Prescriptions:   •  ANORO ELLIPTA 62.5-25 MCG/INH aerosol powder , Take 1 puff by mouth Daily., Disp: , Rfl:   •  COLCRYS 0.6 MG tablet, Take 0.6 mg by mouth 2 (Two) Times a Day., " Disp: , Rfl:   •  famotidine (PEPCID) 20 MG tablet, Take 1 tablet by mouth 2 (Two) Times a Day., Disp: 60 tablet, Rfl: 0  •  HYDROcodone-acetaminophen (NORCO)  MG per tablet, Take 1 tablet by mouth Every 4 (Four) Hours As Needed for moderate pain (4-6)., Disp: 45 tablet, Rfl: 0  •  metoprolol tartrate (LOPRESSOR) 50 MG tablet, Take 1 tablet by mouth Every 12 (Twelve) Hours., Disp: 60 tablet, Rfl: 0  •  midodrine (PROAMATINE) 5 MG tablet, Take 1 tablet by mouth 2 (Two) Times a Day Before Meals for 7 days., Disp: 14 tablet, Rfl: 0  •  nitroglycerin (NITROSTAT) 0.4 MG SL tablet, Place 0.4 mg under the tongue Every 5 (Five) Minutes As Needed for chest pain., Disp: , Rfl:   •  saccharomyces boulardii (FLORASTOR) 250 MG capsule, Take 1 capsule by mouth 2 (Two) Times a Day., Disp: 30 capsule, Rfl: 0  •  sevelamer (RENVELA) 800 MG tablet, Take 1,600 mg by mouth 3 (Three) Times a Day With Meals., Disp: , Rfl:   •  vancomycin 50 mg/mL in distilled water, Take 2.5 mL by mouth Every 6 (Six) Hours., Disp: , Rfl:     Allergies   Allergen Reactions   • Vancomycin Rash     Some type of bumps, not hives, TOLERATES ORAL VANC FINE WITHOUT ANY ADVERSE EFFECT   • Corticosteroids      PATIENT REPORTS VISION PROBLEMS AFTER STEROID INJECTIONS IN HIS BACK        Family History   Problem Relation Age of Onset   • Hypertension Mother    • Hypertension Father    • Stroke Father    • Diabetes Sister    • Hypertension Sister        Social History     Social History   • Marital status:      Spouse name: N/A   • Number of children: N/A   • Years of education: N/A     Occupational History   • Not on file.     Social History Main Topics   • Smoking status: Current Some Day Smoker     Packs/day: 3.00     Types: Cigars   • Smokeless tobacco: Never Used      Comment: 3 SMALL CIGARS PER DAY --STARTED CIGARS 2 YEARS AGO, STOPPED SMOKING CIGARETTES 10 YEARS. 1 PPD starting as a teen to around 2007 stevie when did cigarettes.   • Alcohol use Yes       Comment: RARE PER PT    • Drug use: No   • Sexual activity: Defer     Other Topics Concern   • Not on file     Social History Narrative    Mr. Ruvalcaba is a 62 year old AA  male. They have one young adopted child. They live in [place]. 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 He does not have a living will or advanced directive.       Review of Systems   Constitutional: Negative for activity change, appetite change, fatigue, fever and unexpected weight change.   HENT: Negative for dental problem, hearing loss, mouth sores, postnasal drip, sneezing, trouble swallowing and voice change.    Eyes: Negative for pain, redness, itching and visual disturbance.   Respiratory: Negative for cough, choking, chest tightness, shortness of breath and wheezing.    Cardiovascular: Negative for chest pain, palpitations and leg swelling.   Gastrointestinal: Negative for abdominal distention, abdominal pain, anal bleeding, blood in stool, constipation, diarrhea, nausea, rectal pain and vomiting.   Endocrine: Negative for cold intolerance, heat intolerance, polydipsia, polyphagia and polyuria.   Genitourinary: Negative.  Negative for dysuria, enuresis, flank pain, hematuria and urgency.   Musculoskeletal: Positive for gait problem. Negative for arthralgias, back pain, joint swelling and myalgias.   Skin: Negative for color change, pallor and rash.   Allergic/Immunologic: Negative for environmental allergies, food allergies and immunocompromised state.   Neurological: Positive for weakness. Negative for dizziness, tremors, seizures, facial asymmetry, speech difficulty, numbness and headaches.   Hematological: Negative for adenopathy.   Psychiatric/Behavioral: Negative for behavioral problems, confusion, dysphoric mood, hallucinations and self-injury.       Vitals:    10/04/17 0922   BP: 112/68   Pulse: 93   Temp: 97.2 °F (36.2 °C)   SpO2: 96%        Physical Exam   Constitutional: He is oriented to person, place, and time. No distress.   Morbidly obese   HENT:   Head: Normocephalic and atraumatic.   Mouth/Throat: Oropharynx is clear and moist. No oropharyngeal exudate.   Eyes: EOM are normal. Pupils are equal, round, and reactive to light. No scleral icterus.   Neck: Normal range of motion. Neck supple. No thyromegaly present.   Cardiovascular: Exam reveals no gallop.    Murmur heard.  Irregular rhythm   Pulmonary/Chest: Effort normal and breath sounds normal. He has no wheezes. He has no rales.   Abdominal: Bowel sounds are normal. There is no tenderness. There is no guarding.   Obese, large pannus, peritoneal catheter in place   Musculoskeletal: Normal range of motion. He exhibits no tenderness.   Lymphadenopathy:     He has no cervical adenopathy.   Neurological: He is alert and oriented to person, place, and time. He exhibits normal muscle tone.   Skin: Skin is dry.   Dark nail beds consistent with renal disease   Psychiatric: He has a normal mood and affect. His behavior is normal. Thought content normal.   Vitals reviewed.      Justin was seen today for follow-up.    Diagnoses and all orders for this visit:    Gastrointestinal hemorrhage associated with angiodysplasia of stomach and duodenum  The patient had a duodenal angiectasia that was treated endoscopically. There have been no signs of gastrointestinal bleeding.      Plan: Continue current medication.            Follow up as needed.      I spent over 50% of the office visit counseling and answering questions from the patient.

## 2017-11-03 NOTE — PROGRESS NOTES
"Chief Complaint: \"I have been doing very well with the stimulator.\"    Brief History: Mr. Justin Ruvalcaba Jr. is a 62 y.o. male, who underwent implantation of a spinal cord stimulator device with Dr. Vo on 12/23/2017 with [employer] Penta paddle lead (MRI compatible)  with the top electrodes projecting at the level of the superior endplate of the T7 vertebral level. IPG: [employer] Arnie Proclaim 7 IPG nonrechargeable (MRI compatible full body).   Patient returns to the clinic for evaluation of his chronic lower back pain and possible spinal cord stimulator reprogramming. Mr. Justin Ruvalcaba Jr. reports at least 50% relief along with remarkable functional improvement with the use of his stimulator device. Patient reports significant relief of his previously severe neurogenic claudication.   Pain level is rated as 0/10 (sitting) 5/10 (after standing for too long) with the use of his stimulator device.   Patient level ranges from 0/10 to 5/10 with the use of the spinal cord stimulator device.  Patient has been able to tolerate walking for 5-10 minutes, before his implant he tolerated walking for less than 2 minutes.  Patient denies pain, numbness or weakness in the lower extremities.   Patient denies  any new bladder or bowel problems.   In terms of current analgesics, Justin Ruvalcaba Jr. takes: Hydrocodone    I have reviewed Osmel Report #[id number] consistent to medication reconciliation.    Diagnostic Studies:   MRI Lumbar Spine without contrast on 09/22/2015 revealed, at L3-L4, extensive posterior element hypertrophy and broad based disc protrusion which causes canal stenosis. AP diameter is 9 mm. Foramina appear well maintained. At L4-L5 and L5-S1, canal and foramina appear normal.    X-ray of the bilateral knees on 09/22/2015 revealed cystic degeneration with osteochondral lesions in the central left proximal tibial plateau and in the right tibial plateau and intercondylar notch. Arthropathy is seen extensively " "in both knees, most severe in the anterior compartments of the right knee. Cystic degeneration is seen along the lateral right distal femoral condyle.       The following portions of the patient's history were reviewed and updated as appropriate: problem list, past medical history, past surgery history, social history, family history, medications, and allergies    Review of Systems   Respiratory: Positive for apnea.    Musculoskeletal: Positive for arthralgias, back pain and gait problem.   All other systems reviewed and are negative.    /74 (BP Location: Right arm, Patient Position: Sitting)  Pulse (!) 124  Temp 96.5 °F (35.8 °C) (Temporal Artery )   Resp 19  Ht 72\" (182.9 cm)  Wt (!) 335 lb (152 kg)  SpO2 99%  BMI 45.43 kg/m2     Physical Exam   Neurologic Exam  Constitutional General appearance: Abnormal. Appears healthy, morbidly obese, well hydrated and appearance reflects stated age.    Head and face: Normal.    Palpation of the face and sinuses: No sinus tenderness.    Eyes Conjunctiva and lids: No erythema, swelling or discharge. Pupils: Equal, round, reactive to light.    Neck: Supple, symmetric, trachea midline, no masses.    Pulmonary Respiratory effort: No increased work of breathing or signs of respiratory distress. Auscultation of lungs: Clear to auscultation.    Cardiovascular Auscultation of heart: Normal rate and rhythm, normal S1 and S2, no murmurs. Peripheral vascular exam: Normal. Examination of extremities for edema and/or varicosities: Abnormal.    Abdomen: Non-tender, no masses. The abdomen was obese. Bowel sounds were normal. The abdomen was soft and nontender. No masses palpated. Presence of a peritoneal dialysis catheter.    Musculoskeletal    Skin: dressings intact. No fluid accumulation, drainage, or erythema.  Gait and station: Normal. Gait evaluation demonstrated a normal gait. The range of motion of the lumbar spine is level and without pain. Lumbar facet joint loading " maneuvers are negative. Merrick and Gaenslen's tests are negative. The range of motion of the hip joints is essentially full and without pain. The range of motion of both knees is minor 5 degrees of extension, 100 degrees of flexion. There is significant crepitus upon mobilization of both knees.  Muscle strength/tone: Normal. Motor Strength Findings: normal strength. Motor Tone: normal tone. Involuntary movements: none.    Neurologic    Cranial nerves: Cranial nerves 2-12 intact.    Cortical function: Normal mental status.    Reflexes: 2+ and symmetric. Deep tendon reflexes: 0 right patella, 0 left patella, 0 right ankle jerk and 0 left ankle jerk2+ right biceps and 2+ left biceps. Superficial/Primitive Reflexes: primitive reflexes were absent. Straight leg raising test negative.    Sensation: No sensory loss. Sensory exam: intact to light touch, intact pain and temperature sensation, intact vibration sensation and normal proprioception.    Coordination: Normal finger to nose and heel to shin. Coordination: normal balance and negative Romberg's sign.    Psychiatric    Judgment and insight: Normal.    Orientation to person, place and time: Normal.    Recent and remote memory: Intact.    Mood and affect: Normal.    PROCEDURE: Analysis of the spinal cord stimulator device with complex spinal cord stimulator reprogramming   Patient used the [employer] Jude spinal cord stimulator device for 3384 hours since last visit, and 7560 hours lifetime hours (average 24 hours per day).   Analysis of impedence reveals normal impedence for all contacts.The spinal cord stimulator device was reprogrammed under my direct supervision and two programs were created by adjusting electrode polarities, amplitude, pulse width, pulse rate, Burst DR, and upgrading to surgical mode, in the following fashion;    Program TWO (best program)  Electrode polarities: 1-, 9+, 15-  Amplitude: 1-3.5 mA)  Pulse width: 1000 mcs  Rate: 40 Hz  IntraBurst rate: 500  Hz  Micro-dosin:90   Patient experienced significant pain relief with coverage in all areas of chronic pain.  Walking ability test performed revealing significant improvement of severe neurogenic claudication.  Time spent reprogramming: 15 minutes  A copy of the telemetry report will be scanned in the patient's chart.    ASSESSMENT:   1. Lumbar stenosis with neurogenic claudication    2. Lumbar facet arthropathy    3. Primary osteoarthritis of both knees    4. Morbid obesity due to excess calories    5. ESRD (end stage renal disease)    6. Chronic atrial fibrillation    7. Chronic obstructive pulmonary disease, unspecified COPD type    8. ELLEN (obstructive sleep apnea)    9. Physical deconditioning       PLAN: Patient's chronic pain condition is significantly improved since implantation of his SCS device. Therefore, I have proposed the following plan:  1. Follow-up on an as needed basis at this time  2. Long-term rehabilitation efforts:  a. Patient quit smoking   b. Referral to [facility]  c. Start independent exercise program  3. The patient has been instructed to contact my office with any questions or difficulties. The patient understands the plan and agrees to proceed accordingly.    Patient Care Team:  Merry Thompson MD as PCP - General (Family Medicine)  Chon Vo MD as Consulting Physician (Neurosurgery)  Isidro Gomez MD as Consulting Physician (Cardiology)  Radha Olivo PA-C as Physician Assistant (Physician Assistant)  Santiago Cedeno MD as Consulting Physician (Anesthesiology)  Sherwin Fernando MD as Consulting Physician (Gastroenterology)     New Medications Ordered This Visit   Medications   • REPATHA SURECLICK 140 MG/ML solution auto-injector   • indomethacin (INDOCIN) 50 MG capsule   • midodrine (PROAMATINE) 5 MG tablet   • warfarin (COUMADIN) 10 MG tablet     Sig: Take 10 mg by mouth Daily.         No future appointments.      Santiago  WILL Cedeno MD    EMR Dragon/Transcription disclaimer:  Much of this encounter note is an electronic transcription of spoken language to printed text. Electronic transcription of spoken language may permit erroneous, or at times, nonsensical words or phrases to be inadvertently transcribed. Although I have reviewed the note for such errors, some may still exist.

## 2018-01-01 ENCOUNTER — HOSPITAL ENCOUNTER (OUTPATIENT)
Facility: HOSPITAL | Age: 63
Setting detail: HOSPITAL OUTPATIENT SURGERY
Discharge: HOME | End: 2018-07-20
Attending: INTERNAL MEDICINE | Admitting: INTERNAL MEDICINE

## 2018-01-01 ENCOUNTER — APPOINTMENT (OUTPATIENT)
Dept: GENERAL RADIOLOGY | Facility: HOSPITAL | Age: 63
End: 2018-01-01

## 2018-01-01 ENCOUNTER — APPOINTMENT (OUTPATIENT)
Dept: CT IMAGING | Facility: HOSPITAL | Age: 63
End: 2018-01-01

## 2018-01-01 ENCOUNTER — HOSPITAL ENCOUNTER (OUTPATIENT)
Facility: HOSPITAL | Age: 63
Setting detail: HOSPITAL OUTPATIENT SURGERY
Discharge: HOME | End: 2018-06-26
Attending: INTERNAL MEDICINE | Admitting: INTERNAL MEDICINE

## 2018-01-01 ENCOUNTER — HOSPITAL ENCOUNTER (INPATIENT)
Facility: HOSPITAL | Age: 63
LOS: 8 days | End: 2018-07-29
Attending: EMERGENCY MEDICINE | Admitting: INTERNAL MEDICINE

## 2018-01-01 ENCOUNTER — TRANSCRIBE ORDERS (OUTPATIENT)
Dept: ADMINISTRATIVE | Facility: HOSPITAL | Age: 63
End: 2018-01-01

## 2018-01-01 ENCOUNTER — TRANSCRIBE ORDERS (OUTPATIENT)
Dept: CARDIOLOGY | Facility: HOSPITAL | Age: 63
End: 2018-01-01

## 2018-01-01 ENCOUNTER — APPOINTMENT (OUTPATIENT)
Dept: CT IMAGING | Facility: HOSPITAL | Age: 63
End: 2018-01-01
Attending: INTERNAL MEDICINE

## 2018-01-01 ENCOUNTER — HOSPITAL ENCOUNTER (OUTPATIENT)
Dept: CARDIOLOGY | Facility: HOSPITAL | Age: 63
Discharge: HOME | End: 2018-06-19
Attending: INTERNAL MEDICINE | Admitting: INTERNAL MEDICINE

## 2018-01-01 VITALS
RESPIRATION RATE: 30 BRPM | TEMPERATURE: 98.3 F | HEIGHT: 72 IN | WEIGHT: 315 LBS | SYSTOLIC BLOOD PRESSURE: 84 MMHG | BODY MASS INDEX: 42.66 KG/M2 | DIASTOLIC BLOOD PRESSURE: 66 MMHG | OXYGEN SATURATION: 76 %

## 2018-01-01 VITALS
RESPIRATION RATE: 18 BRPM | BODY MASS INDEX: 42.66 KG/M2 | DIASTOLIC BLOOD PRESSURE: 82 MMHG | SYSTOLIC BLOOD PRESSURE: 130 MMHG | HEIGHT: 72 IN | TEMPERATURE: 98.1 F | OXYGEN SATURATION: 93 % | WEIGHT: 315 LBS | HEART RATE: 77 BPM

## 2018-01-01 VITALS
HEART RATE: 100 BPM | BODY MASS INDEX: 42.66 KG/M2 | WEIGHT: 315 LBS | TEMPERATURE: 97.3 F | HEIGHT: 72 IN | RESPIRATION RATE: 16 BRPM | SYSTOLIC BLOOD PRESSURE: 123 MMHG | OXYGEN SATURATION: 97 % | DIASTOLIC BLOOD PRESSURE: 81 MMHG

## 2018-01-01 VITALS
HEIGHT: 72 IN | SYSTOLIC BLOOD PRESSURE: 113 MMHG | BODY MASS INDEX: 42.66 KG/M2 | DIASTOLIC BLOOD PRESSURE: 60 MMHG | OXYGEN SATURATION: 97 % | RESPIRATION RATE: 18 BRPM | TEMPERATURE: 97.3 F | WEIGHT: 315 LBS | HEART RATE: 86 BPM

## 2018-01-01 DIAGNOSIS — N18.6 STAGE 5 CHRONIC KIDNEY DISEASE ON CHRONIC DIALYSIS (HCC): ICD-10-CM

## 2018-01-01 DIAGNOSIS — Z78.9 IMPAIRED MOBILITY AND ADLS: ICD-10-CM

## 2018-01-01 DIAGNOSIS — I20.0 UNSTABLE ANGINA PECTORIS (HCC): ICD-10-CM

## 2018-01-01 DIAGNOSIS — Z74.09 IMPAIRED MOBILITY AND ADLS: ICD-10-CM

## 2018-01-01 DIAGNOSIS — I50.20 SYSTOLIC CONGESTIVE HEART FAILURE, UNSPECIFIED CONGESTIVE HEART FAILURE CHRONICITY: ICD-10-CM

## 2018-01-01 DIAGNOSIS — I34.8 OTHER NONRHEUMATIC MITRAL VALVE DISORDERS (CODE): Primary | ICD-10-CM

## 2018-01-01 DIAGNOSIS — K65.9 PERITONITIS (HCC): Primary | ICD-10-CM

## 2018-01-01 DIAGNOSIS — Z99.2 STAGE 5 CHRONIC KIDNEY DISEASE ON CHRONIC DIALYSIS (HCC): ICD-10-CM

## 2018-01-01 DIAGNOSIS — J96.02 ACUTE RESPIRATORY FAILURE WITH HYPOXIA AND HYPERCARBIA (HCC): ICD-10-CM

## 2018-01-01 DIAGNOSIS — R77.8 ELEVATED TROPONIN: ICD-10-CM

## 2018-01-01 DIAGNOSIS — R10.84 GENERALIZED ABDOMINAL PAIN: ICD-10-CM

## 2018-01-01 DIAGNOSIS — I48.19 PERSISTENT ATRIAL FIBRILLATION (HCC): ICD-10-CM

## 2018-01-01 DIAGNOSIS — J96.01 ACUTE RESPIRATORY FAILURE WITH HYPOXIA AND HYPERCARBIA (HCC): ICD-10-CM

## 2018-01-01 DIAGNOSIS — Z74.09 IMPAIRED FUNCTIONAL MOBILITY, BALANCE, GAIT, AND ENDURANCE: ICD-10-CM

## 2018-01-01 DIAGNOSIS — I34.8 OTHER NONRHEUMATIC MITRAL VALVE DISORDERS (CODE): ICD-10-CM

## 2018-01-01 DIAGNOSIS — R09.02 HYPOXIA: ICD-10-CM

## 2018-01-01 DIAGNOSIS — I95.9 HYPOTENSION, UNSPECIFIED HYPOTENSION TYPE: ICD-10-CM

## 2018-01-01 DIAGNOSIS — I20.0 UNSTABLE ANGINA PECTORIS (HCC): Primary | ICD-10-CM

## 2018-01-01 DIAGNOSIS — I48.91 ATRIAL FIBRILLATION WITH RAPID VENTRICULAR RESPONSE (HCC): ICD-10-CM

## 2018-01-01 LAB
ABO GROUP BLD: NORMAL
ALBUMIN SERPL-MCNC: 3.26 G/DL (ref 3.2–4.8)
ALBUMIN SERPL-MCNC: 3.4 G/DL (ref 3.2–4.8)
ALBUMIN SERPL-MCNC: 3.4 G/DL (ref 3.2–4.8)
ALBUMIN SERPL-MCNC: 3.57 G/DL (ref 3.2–4.8)
ALBUMIN SERPL-MCNC: 3.6 G/DL (ref 3.2–4.8)
ALBUMIN SERPL-MCNC: 3.77 G/DL (ref 3.2–4.8)
ALBUMIN SERPL-MCNC: 3.79 G/DL (ref 3.2–4.8)
ALBUMIN SERPL-MCNC: 3.8 G/DL (ref 3.2–4.8)
ALBUMIN/GLOB SERPL: 0.9 G/DL (ref 1.5–2.5)
ALBUMIN/GLOB SERPL: 1.1 G/DL (ref 1.5–2.5)
ALP SERPL-CCNC: 53 U/L (ref 25–100)
ALP SERPL-CCNC: 81 U/L (ref 25–100)
ALP SERPL-CCNC: 82 U/L (ref 25–100)
ALP SERPL-CCNC: 84 U/L (ref 25–100)
ALT SERPL W P-5'-P-CCNC: 20 U/L (ref 7–40)
ALT SERPL W P-5'-P-CCNC: 24 U/L (ref 7–40)
ALT SERPL W P-5'-P-CCNC: 40 U/L (ref 7–40)
ALT SERPL W P-5'-P-CCNC: 8 U/L (ref 7–40)
AMMONIA BLD-SCNC: 44 UMOL/L (ref 19–60)
ANION GAP SERPL CALCULATED.3IONS-SCNC: 12 MMOL/L (ref 3–11)
ANION GAP SERPL CALCULATED.3IONS-SCNC: 12 MMOL/L (ref 3–11)
ANION GAP SERPL CALCULATED.3IONS-SCNC: 13 MMOL/L (ref 3–11)
ANION GAP SERPL CALCULATED.3IONS-SCNC: 14 MMOL/L (ref 3–11)
ANION GAP SERPL CALCULATED.3IONS-SCNC: 14 MMOL/L (ref 3–11)
ANION GAP SERPL CALCULATED.3IONS-SCNC: 15 MMOL/L (ref 3–11)
ANION GAP SERPL CALCULATED.3IONS-SCNC: 16 MMOL/L (ref 3–11)
ANION GAP SERPL CALCULATED.3IONS-SCNC: 17 MMOL/L (ref 3–11)
ANION GAP SERPL CALCULATED.3IONS-SCNC: 19 MMOL/L (ref 3–11)
ANION GAP SERPL CALCULATED.3IONS-SCNC: 21 MMOL/L (ref 3–11)
ANION GAP SERPL CALCULATED.3IONS-SCNC: 22 MMOL/L (ref 3–11)
APPEARANCE FLD: ABNORMAL
APTT PPP: 38.8 SECONDS (ref 24–31)
ARTERIAL PATENCY WRIST A: ABNORMAL
AST SERPL-CCNC: 10 U/L (ref 0–33)
AST SERPL-CCNC: 25 U/L (ref 0–33)
AST SERPL-CCNC: 28 U/L (ref 0–33)
AST SERPL-CCNC: 56 U/L (ref 0–33)
ATMOSPHERIC PRESS: ABNORMAL MMHG
BACTERIA FLD CULT: ABNORMAL
BACTERIA SPEC AEROBE CULT: NORMAL
BACTERIA SPEC AEROBE CULT: NORMAL
BASE EXCESS BLDA CALC-SCNC: -0.9 MMOL/L (ref 0–2)
BASE EXCESS BLDA CALC-SCNC: -1 MMOL/L (ref 0–2)
BASE EXCESS BLDA CALC-SCNC: -2.7 MMOL/L (ref 0–2)
BASE EXCESS BLDA CALC-SCNC: 0.3 MMOL/L (ref 0–2)
BASOPHILS # BLD AUTO: 0.01 10*3/MM3 (ref 0–0.2)
BASOPHILS # BLD AUTO: 0.01 10*3/MM3 (ref 0–0.2)
BASOPHILS # BLD AUTO: 0.03 10*3/MM3 (ref 0–0.2)
BASOPHILS # BLD MANUAL: 0 10*3/MM3 (ref 0–0.2)
BASOPHILS NFR BLD AUTO: 0 % (ref 0–1)
BASOPHILS NFR BLD AUTO: 0.2 % (ref 0–1)
BASOPHILS NFR BLD AUTO: 0.2 % (ref 0–1)
BASOPHILS NFR BLD AUTO: 0.5 % (ref 0–1)
BDY SITE: ABNORMAL
BH CV ECHO MEAS - BSA(HAYCOCK): 3 M^2
BH CV ECHO MEAS - BSA: 2.7 M^2
BH CV ECHO MEAS - BZI_BMI: 48.6 KILOGRAMS/M^2
BH CV ECHO MEAS - BZI_METRIC_HEIGHT: 182.9 CM
BH CV ECHO MEAS - BZI_METRIC_WEIGHT: 162.4 KG
BILIRUB SERPL-MCNC: 0.8 MG/DL (ref 0.3–1.2)
BILIRUB SERPL-MCNC: 1 MG/DL (ref 0.3–1.2)
BILIRUB SERPL-MCNC: 1 MG/DL (ref 0.3–1.2)
BILIRUB SERPL-MCNC: 1.2 MG/DL (ref 0.3–1.2)
BLD GP AB SCN SERPL QL: NEGATIVE
BNP SERPL-MCNC: 363 PG/ML (ref 0–100)
BUN BLD-MCNC: 43 MG/DL (ref 9–23)
BUN BLD-MCNC: 47 MG/DL (ref 9–23)
BUN BLD-MCNC: 48 MG/DL (ref 9–23)
BUN BLD-MCNC: 48 MG/DL (ref 9–23)
BUN BLD-MCNC: 49 MG/DL (ref 9–23)
BUN BLD-MCNC: 53 MG/DL (ref 9–23)
BUN BLD-MCNC: 55 MG/DL (ref 9–23)
BUN BLD-MCNC: 57 MG/DL (ref 9–23)
BUN BLD-MCNC: 58 MG/DL (ref 9–23)
BUN BLD-MCNC: 60 MG/DL (ref 9–23)
BUN BLD-MCNC: 61 MG/DL (ref 9–23)
BUN BLDA-MCNC: 38 MG/DL (ref 8–26)
BUN/CREAT SERPL: 4 (ref 7–25)
BUN/CREAT SERPL: 4.2 (ref 7–25)
BUN/CREAT SERPL: 4.5 (ref 7–25)
BUN/CREAT SERPL: 4.5 (ref 7–25)
BUN/CREAT SERPL: 4.6 (ref 7–25)
BUN/CREAT SERPL: 4.7 (ref 7–25)
BUN/CREAT SERPL: 5.1 (ref 7–25)
BUN/CREAT SERPL: 5.2 (ref 7–25)
BUN/CREAT SERPL: 5.3 (ref 7–25)
BUN/CREAT SERPL: 5.4 (ref 7–25)
BUN/CREAT SERPL: 5.4 (ref 7–25)
CA-I BLDA-SCNC: 1.17 MMOL/L (ref 1.2–1.32)
CALCIUM SPEC-SCNC: 7.7 MG/DL (ref 8.7–10.4)
CALCIUM SPEC-SCNC: 8.1 MG/DL (ref 8.7–10.4)
CALCIUM SPEC-SCNC: 8.5 MG/DL (ref 8.7–10.4)
CALCIUM SPEC-SCNC: 8.7 MG/DL (ref 8.7–10.4)
CALCIUM SPEC-SCNC: 8.8 MG/DL (ref 8.7–10.4)
CALCIUM SPEC-SCNC: 9 MG/DL (ref 8.7–10.4)
CALCIUM SPEC-SCNC: 9.1 MG/DL (ref 8.7–10.4)
CALCIUM SPEC-SCNC: 9.3 MG/DL (ref 8.7–10.4)
CALCIUM SPEC-SCNC: 9.4 MG/DL (ref 8.7–10.4)
CALCIUM SPEC-SCNC: 9.7 MG/DL (ref 8.7–10.4)
CALCIUM SPEC-SCNC: 9.8 MG/DL (ref 8.7–10.4)
CHLORIDE BLDA-SCNC: 92 MMOL/L (ref 98–109)
CHLORIDE SERPL-SCNC: 91 MMOL/L (ref 99–109)
CHLORIDE SERPL-SCNC: 92 MMOL/L (ref 99–109)
CHLORIDE SERPL-SCNC: 93 MMOL/L (ref 99–109)
CHLORIDE SERPL-SCNC: 93 MMOL/L (ref 99–109)
CHLORIDE SERPL-SCNC: 96 MMOL/L (ref 99–109)
CO2 BLDA-SCNC: 26.1 MMOL/L (ref 22–33)
CO2 BLDA-SCNC: 27.9 MMOL/L (ref 22–33)
CO2 BLDA-SCNC: 28.2 MMOL/L (ref 22–33)
CO2 BLDA-SCNC: 29 MMOL/L (ref 24–29)
CO2 BLDA-SCNC: 31.5 MMOL/L (ref 22–33)
CO2 SERPL-SCNC: 21 MMOL/L (ref 20–31)
CO2 SERPL-SCNC: 23 MMOL/L (ref 20–31)
CO2 SERPL-SCNC: 23 MMOL/L (ref 20–31)
CO2 SERPL-SCNC: 25 MMOL/L (ref 20–31)
CO2 SERPL-SCNC: 26 MMOL/L (ref 20–31)
CO2 SERPL-SCNC: 27 MMOL/L (ref 20–31)
CO2 SERPL-SCNC: 28 MMOL/L (ref 20–31)
CO2 SERPL-SCNC: 30 MMOL/L (ref 20–31)
COHGB MFR BLD: 1.3 % (ref 0–2)
COHGB MFR BLD: 1.5 % (ref 0–2)
COHGB MFR BLD: 1.5 % (ref 0–2)
COHGB MFR BLD: 1.6 % (ref 0–2)
COLOR FLD: ABNORMAL
CREAT BLD-MCNC: 10.53 MG/DL (ref 0.6–1.3)
CREAT BLD-MCNC: 10.6 MG/DL (ref 0.6–1.3)
CREAT BLD-MCNC: 10.67 MG/DL (ref 0.6–1.3)
CREAT BLD-MCNC: 10.7 MG/DL (ref 0.6–1.3)
CREAT BLD-MCNC: 10.75 MG/DL (ref 0.6–1.3)
CREAT BLD-MCNC: 10.9 MG/DL (ref 0.6–1.3)
CREAT BLD-MCNC: 11.06 MG/DL (ref 0.6–1.3)
CREAT BLD-MCNC: 11.15 MG/DL (ref 0.6–1.3)
CREAT BLD-MCNC: 11.16 MG/DL (ref 0.6–1.3)
CREAT BLD-MCNC: 11.25 MG/DL (ref 0.6–1.3)
CREAT BLD-MCNC: 11.37 MG/DL (ref 0.6–1.3)
CREAT BLDA-MCNC: 10 MG/DL (ref 0.6–1.3)
D-LACTATE SERPL-SCNC: 1.5 MMOL/L (ref 0.5–2)
D-LACTATE SERPL-SCNC: 2.5 MMOL/L (ref 0.5–2)
D-LACTATE SERPL-SCNC: 2.9 MMOL/L (ref 0.5–2)
D-LACTATE SERPL-SCNC: 2.9 MMOL/L (ref 0.5–2)
D-LACTATE SERPL-SCNC: 3.6 MMOL/L (ref 0.5–2)
D-LACTATE SERPL-SCNC: 3.8 MMOL/L (ref 0.5–2)
D-LACTATE SERPL-SCNC: 3.8 MMOL/L (ref 0.5–2)
D-LACTATE SERPL-SCNC: 6.1 MMOL/L (ref 0.5–2)
DEPRECATED RDW RBC AUTO: 61.9 FL (ref 37–54)
DEPRECATED RDW RBC AUTO: 62.2 FL (ref 37–54)
DEPRECATED RDW RBC AUTO: 64 FL (ref 37–54)
DEPRECATED RDW RBC AUTO: 64.4 FL (ref 37–54)
DEPRECATED RDW RBC AUTO: 65.3 FL (ref 37–54)
DEPRECATED RDW RBC AUTO: 65.4 FL (ref 37–54)
DEPRECATED RDW RBC AUTO: 66 FL (ref 37–54)
DEPRECATED RDW RBC AUTO: 66.1 FL (ref 37–54)
DEPRECATED RDW RBC AUTO: 66.4 FL (ref 37–54)
EOSINOPHIL # BLD AUTO: 0.01 10*3/MM3 (ref 0–0.3)
EOSINOPHIL # BLD AUTO: 0.31 10*3/MM3 (ref 0–0.3)
EOSINOPHIL # BLD AUTO: 0.46 10*3/MM3 (ref 0–0.3)
EOSINOPHIL # BLD MANUAL: 0 10*3/MM3 (ref 0.1–0.3)
EOSINOPHIL # BLD MANUAL: 0.06 10*3/MM3 (ref 0.1–0.3)
EOSINOPHIL # BLD MANUAL: 0.25 10*3/MM3 (ref 0.1–0.3)
EOSINOPHIL # BLD MANUAL: 0.29 10*3/MM3 (ref 0.1–0.3)
EOSINOPHIL NFR BLD AUTO: 0.2 % (ref 0–3)
EOSINOPHIL NFR BLD AUTO: 5 % (ref 0–3)
EOSINOPHIL NFR BLD AUTO: 8.5 % (ref 0–3)
EOSINOPHIL NFR BLD MANUAL: 0 % (ref 0–3)
EOSINOPHIL NFR BLD MANUAL: 1 % (ref 0–3)
EOSINOPHIL NFR BLD MANUAL: 4 % (ref 0–3)
EOSINOPHIL NFR BLD MANUAL: 5 % (ref 0–3)
EOSINOPHIL NFR FLD MANUAL: 1 %
ERYTHROCYTE [DISTWIDTH] IN BLOOD BY AUTOMATED COUNT: 16.7 % (ref 11.3–14.5)
ERYTHROCYTE [DISTWIDTH] IN BLOOD BY AUTOMATED COUNT: 16.8 % (ref 11.3–14.5)
ERYTHROCYTE [DISTWIDTH] IN BLOOD BY AUTOMATED COUNT: 16.8 % (ref 11.3–14.5)
ERYTHROCYTE [DISTWIDTH] IN BLOOD BY AUTOMATED COUNT: 17 % (ref 11.3–14.5)
ERYTHROCYTE [DISTWIDTH] IN BLOOD BY AUTOMATED COUNT: 17 % (ref 11.3–14.5)
ERYTHROCYTE [DISTWIDTH] IN BLOOD BY AUTOMATED COUNT: 17.1 % (ref 11.3–14.5)
ERYTHROCYTE [DISTWIDTH] IN BLOOD BY AUTOMATED COUNT: 17.2 % (ref 11.3–14.5)
GASTROCULT GAST QL: POSITIVE
GENTAMICIN SERPL-MCNC: 3.6 MCG/ML (ref 0.5–10)
GFR SERPL CREATININE-BSD FRML MDRD: 6 ML/MIN/1.73
GLOBULIN UR ELPH-MCNC: 3.2 GM/DL
GLOBULIN UR ELPH-MCNC: 3.6 GM/DL
GLOBULIN UR ELPH-MCNC: 4 GM/DL
GLOBULIN UR ELPH-MCNC: 4.1 GM/DL
GLUCOSE BLD-MCNC: 101 MG/DL (ref 70–100)
GLUCOSE BLD-MCNC: 105 MG/DL (ref 70–100)
GLUCOSE BLD-MCNC: 110 MG/DL (ref 70–100)
GLUCOSE BLD-MCNC: 114 MG/DL (ref 70–100)
GLUCOSE BLD-MCNC: 116 MG/DL (ref 70–100)
GLUCOSE BLD-MCNC: 119 MG/DL (ref 70–100)
GLUCOSE BLD-MCNC: 120 MG/DL (ref 70–100)
GLUCOSE BLD-MCNC: 175 MG/DL (ref 70–100)
GLUCOSE BLD-MCNC: 87 MG/DL (ref 70–100)
GLUCOSE BLD-MCNC: 90 MG/DL (ref 70–100)
GLUCOSE BLD-MCNC: 98 MG/DL (ref 70–100)
GLUCOSE BLDC GLUCOMTR-MCNC: 129 MG/DL (ref 70–130)
GLUCOSE BLDC GLUCOMTR-MCNC: 86 MG/DL (ref 70–130)
GLUCOSE BLDC GLUCOMTR-MCNC: 93 MG/DL (ref 70–130)
GRAM STN SPEC: ABNORMAL
GRAM STN SPEC: ABNORMAL
HBA1C MFR BLD: 5.6 % (ref 4.8–5.6)
HCO3 BLDA-SCNC: 24.5 MMOL/L (ref 20–26)
HCO3 BLDA-SCNC: 26.2 MMOL/L (ref 20–26)
HCO3 BLDA-SCNC: 26.7 MMOL/L (ref 20–26)
HCO3 BLDA-SCNC: 29 MMOL/L (ref 20–26)
HCT VFR BLD AUTO: 27.1 % (ref 38.9–50.9)
HCT VFR BLD AUTO: 28.3 % (ref 38.9–50.9)
HCT VFR BLD AUTO: 28.5 % (ref 38.9–50.9)
HCT VFR BLD AUTO: 29.2 % (ref 38.9–50.9)
HCT VFR BLD AUTO: 29.8 % (ref 38.9–50.9)
HCT VFR BLD AUTO: 30.4 % (ref 38.9–50.9)
HCT VFR BLD AUTO: 31 % (ref 38.9–50.9)
HCT VFR BLD AUTO: 32.2 % (ref 38.9–50.9)
HCT VFR BLD AUTO: 34.7 % (ref 38.9–50.9)
HCT VFR BLD CALC: 28.5 %
HCT VFR BLD CALC: 28.7 %
HCT VFR BLD CALC: 29.4 %
HCT VFR BLD CALC: 31.1 %
HCT VFR BLDA CALC: 32 % (ref 38–51)
HGB BLD-MCNC: 10 G/DL (ref 13.1–17.5)
HGB BLD-MCNC: 10.3 G/DL (ref 13.1–17.5)
HGB BLD-MCNC: 11.4 G/DL (ref 13.1–17.5)
HGB BLD-MCNC: 8.3 G/DL (ref 13.1–17.5)
HGB BLD-MCNC: 8.9 G/DL (ref 13.1–17.5)
HGB BLD-MCNC: 9 G/DL (ref 13.1–17.5)
HGB BLD-MCNC: 9 G/DL (ref 13.1–17.5)
HGB BLD-MCNC: 9.3 G/DL (ref 13.1–17.5)
HGB BLD-MCNC: 9.8 G/DL (ref 13.1–17.5)
HGB BLDA-MCNC: 10.2 G/DL (ref 13.5–17.5)
HGB BLDA-MCNC: 10.9 G/DL (ref 12–17)
HGB BLDA-MCNC: 9.3 G/DL (ref 13.5–17.5)
HGB BLDA-MCNC: 9.4 G/DL (ref 13.5–17.5)
HGB BLDA-MCNC: 9.6 G/DL (ref 13.5–17.5)
HOLD SPECIMEN: NORMAL
HOROWITZ INDEX BLD+IHG-RTO: 100 %
HOROWITZ INDEX BLD+IHG-RTO: 100 %
HOROWITZ INDEX BLD+IHG-RTO: 28 %
HOROWITZ INDEX BLD+IHG-RTO: 32 %
IMM GRANULOCYTES # BLD: 0.03 10*3/MM3 (ref 0–0.03)
IMM GRANULOCYTES # BLD: 0.03 10*3/MM3 (ref 0–0.03)
IMM GRANULOCYTES # BLD: 0.5 10*3/MM3 (ref 0–0.03)
IMM GRANULOCYTES NFR BLD: 0.5 % (ref 0–0.6)
IMM GRANULOCYTES NFR BLD: 0.6 % (ref 0–0.6)
IMM GRANULOCYTES NFR BLD: 8 % (ref 0–0.6)
INR PPP: 1.16 (ref 0.91–1.09)
INR PPP: 1.2 (ref 0.91–1.09)
INR PPP: 1.32 (ref 0.91–1.09)
INR PPP: 1.4 (ref 0.91–1.09)
INR PPP: 1.44 (ref 0.91–1.09)
INR PPP: 1.55 (ref 0.91–1.09)
INR PPP: 1.55 (ref 0.91–1.09)
INR PPP: 1.92 (ref 0.91–1.09)
INR PPP: 2.14 (ref 0.91–1.09)
INR PPP: 2.6 (ref 0.91–1.09)
INR PPP: 3.31 (ref 0.91–1.09)
INR PPP: 4.33 (ref 0.91–1.09)
INR PPP: 7.15 (ref 0.91–1.09)
LIPASE SERPL-CCNC: 161 U/L (ref 6–51)
LIPASE SERPL-CCNC: 28 U/L (ref 6–51)
LYMPHOCYTES # BLD AUTO: 0.63 10*3/MM3 (ref 0.6–4.8)
LYMPHOCYTES # BLD AUTO: 0.69 10*3/MM3 (ref 0.6–4.8)
LYMPHOCYTES # BLD AUTO: 0.69 10*3/MM3 (ref 0.6–4.8)
LYMPHOCYTES # BLD MANUAL: 0.5 10*3/MM3 (ref 0.6–4.8)
LYMPHOCYTES # BLD MANUAL: 0.59 10*3/MM3 (ref 0.6–4.8)
LYMPHOCYTES # BLD MANUAL: 1.22 10*3/MM3 (ref 0.6–4.8)
LYMPHOCYTES # BLD MANUAL: 1.54 10*3/MM3 (ref 0.6–4.8)
LYMPHOCYTES NFR BLD AUTO: 10.1 % (ref 24–44)
LYMPHOCYTES NFR BLD AUTO: 11.6 % (ref 24–44)
LYMPHOCYTES NFR BLD AUTO: 12.8 % (ref 24–44)
LYMPHOCYTES NFR BLD MANUAL: 10 % (ref 24–44)
LYMPHOCYTES NFR BLD MANUAL: 12 % (ref 24–44)
LYMPHOCYTES NFR BLD MANUAL: 14 % (ref 0–12)
LYMPHOCYTES NFR BLD MANUAL: 17 % (ref 24–44)
LYMPHOCYTES NFR BLD MANUAL: 24 % (ref 0–12)
LYMPHOCYTES NFR BLD MANUAL: 7 % (ref 0–12)
LYMPHOCYTES NFR BLD MANUAL: 8 % (ref 24–44)
LYMPHOCYTES NFR BLD MANUAL: 9 % (ref 0–12)
LYMPHOCYTES NFR FLD MANUAL: 7 %
MACROCYTES BLD QL SMEAR: ABNORMAL
MAGNESIUM SERPL-MCNC: 1.9 MG/DL (ref 1.3–2.7)
MAGNESIUM SERPL-MCNC: 2.1 MG/DL (ref 1.3–2.7)
MAGNESIUM SERPL-MCNC: 2.1 MG/DL (ref 1.3–2.7)
MCH RBC QN AUTO: 32.5 PG (ref 27–31)
MCH RBC QN AUTO: 33 PG (ref 27–31)
MCH RBC QN AUTO: 33 PG (ref 27–31)
MCH RBC QN AUTO: 33.1 PG (ref 27–31)
MCH RBC QN AUTO: 33.4 PG (ref 27–31)
MCH RBC QN AUTO: 33.4 PG (ref 27–31)
MCH RBC QN AUTO: 33.5 PG (ref 27–31)
MCH RBC QN AUTO: 33.6 PG (ref 27–31)
MCH RBC QN AUTO: 34 PG (ref 27–31)
MCHC RBC AUTO-ENTMCNC: 30.6 G/DL (ref 32–36)
MCHC RBC AUTO-ENTMCNC: 30.8 G/DL (ref 32–36)
MCHC RBC AUTO-ENTMCNC: 31.2 G/DL (ref 32–36)
MCHC RBC AUTO-ENTMCNC: 31.2 G/DL (ref 32–36)
MCHC RBC AUTO-ENTMCNC: 31.6 G/DL (ref 32–36)
MCHC RBC AUTO-ENTMCNC: 31.8 G/DL (ref 32–36)
MCHC RBC AUTO-ENTMCNC: 32 G/DL (ref 32–36)
MCHC RBC AUTO-ENTMCNC: 32.9 G/DL (ref 32–36)
MCHC RBC AUTO-ENTMCNC: 32.9 G/DL (ref 32–36)
MCV RBC AUTO: 101.8 FL (ref 80–99)
MCV RBC AUTO: 103.4 FL (ref 80–99)
MCV RBC AUTO: 104.5 FL (ref 80–99)
MCV RBC AUTO: 105.2 FL (ref 80–99)
MCV RBC AUTO: 105.6 FL (ref 80–99)
MCV RBC AUTO: 105.7 FL (ref 80–99)
MCV RBC AUTO: 106.2 FL (ref 80–99)
MCV RBC AUTO: 106.3 FL (ref 80–99)
MCV RBC AUTO: 107.4 FL (ref 80–99)
METAMYELOCYTES NFR BLD MANUAL: 1 % (ref 0–0)
METHGB BLD QL: 0.6 % (ref 0–1.5)
METHGB BLD QL: 0.8 % (ref 0–1.5)
METHGB BLD QL: 0.9 % (ref 0–1.5)
METHGB BLD QL: 1 % (ref 0–1.5)
MODALITY: ABNORMAL
MONOCYTES # BLD AUTO: 0.56 10*3/MM3 (ref 0–1)
MONOCYTES # BLD AUTO: 0.63 10*3/MM3 (ref 0–1)
MONOCYTES # BLD AUTO: 0.81 10*3/MM3 (ref 0–1)
MONOCYTES # BLD AUTO: 1 10*3/MM3 (ref 0–1)
MONOCYTES # BLD AUTO: 1.08 10*3/MM3 (ref 0–1)
MONOCYTES # BLD AUTO: 1.09 10*3/MM3 (ref 0–1)
MONOCYTES # BLD AUTO: 1.18 10*3/MM3 (ref 0–1)
MONOCYTES NFR BLD AUTO: 11.7 % (ref 0–12)
MONOCYTES NFR BLD AUTO: 13.6 % (ref 0–12)
MONOCYTES NFR BLD AUTO: 17.4 % (ref 0–12)
MONOCYTES NFR FLD: 14 %
MYELOCYTES NFR BLD MANUAL: 1 % (ref 0–0)
MYELOCYTES NFR BLD MANUAL: 2 % (ref 0–0)
MYELOCYTES NFR BLD MANUAL: 3 % (ref 0–0)
NEUTROPHILS # BLD AUTO: 12.45 10*3/MM3 (ref 1.5–8.3)
NEUTROPHILS # BLD AUTO: 2.7 10*3/MM3 (ref 1.5–8.3)
NEUTROPHILS # BLD AUTO: 3.6 10*3/MM3 (ref 1.5–8.3)
NEUTROPHILS # BLD AUTO: 4.19 10*3/MM3 (ref 1.5–8.3)
NEUTROPHILS # BLD AUTO: 4.44 10*3/MM3 (ref 1.5–8.3)
NEUTROPHILS # BLD AUTO: 4.44 10*3/MM3 (ref 1.5–8.3)
NEUTROPHILS # BLD AUTO: 5.13 10*3/MM3 (ref 1.5–8.3)
NEUTROPHILS NFR BLD AUTO: 66.8 % (ref 41–71)
NEUTROPHILS NFR BLD AUTO: 67 % (ref 41–71)
NEUTROPHILS NFR BLD AUTO: 74.4 % (ref 41–71)
NEUTROPHILS NFR BLD MANUAL: 50 % (ref 41–71)
NEUTROPHILS NFR BLD MANUAL: 51 % (ref 41–71)
NEUTROPHILS NFR BLD MANUAL: 70 % (ref 41–71)
NEUTROPHILS NFR BLD MANUAL: 74 % (ref 41–71)
NEUTROPHILS NFR FLD MANUAL: 78 %
NEUTS BAND NFR BLD MANUAL: 11 % (ref 0–5)
NEUTS BAND NFR BLD MANUAL: 11 % (ref 0–5)
NEUTS BAND NFR BLD MANUAL: 5 % (ref 0–5)
NEUTS BAND NFR BLD MANUAL: 8 % (ref 0–5)
NRBC SPEC MANUAL: 1 /100 WBC (ref 0–0)
NRBC SPEC MANUAL: 2 /100 WBC (ref 0–0)
NRBC SPEC MANUAL: 3 /100 WBC (ref 0–0)
OXYHGB MFR BLDV: 74.7 % (ref 94–99)
OXYHGB MFR BLDV: 74.8 % (ref 94–99)
OXYHGB MFR BLDV: 86.5 % (ref 94–99)
OXYHGB MFR BLDV: 97.5 % (ref 94–99)
PCO2 BLDA: 50.9 MM HG (ref 35–48)
PCO2 BLDA: 53.2 MM HG (ref 35–48)
PCO2 BLDA: 55.4 MM HG (ref 35–48)
PCO2 BLDA: 82.3 MM HG (ref 35–48)
PH BLDA: 7.16 PH UNITS (ref 7.35–7.45)
PH BLDA: 7.27 PH UNITS (ref 7.35–7.45)
PH BLDA: 7.28 PH UNITS (ref 7.35–7.45)
PH BLDA: 7.33 PH UNITS (ref 7.35–7.45)
PH GAST: ABNORMAL [PH]
PHOSPHATE SERPL-MCNC: 5.5 MG/DL (ref 2.4–5.1)
PHOSPHATE SERPL-MCNC: 5.6 MG/DL (ref 2.4–5.1)
PHOSPHATE SERPL-MCNC: 6.1 MG/DL (ref 2.4–5.1)
PHOSPHATE SERPL-MCNC: 6.4 MG/DL (ref 2.4–5.1)
PHOSPHATE SERPL-MCNC: 6.7 MG/DL (ref 2.4–5.1)
PLAT MORPH BLD: NORMAL
PLATELET # BLD AUTO: 118 10*3/MM3 (ref 150–450)
PLATELET # BLD AUTO: 138 10*3/MM3 (ref 150–450)
PLATELET # BLD AUTO: 141 10*3/MM3 (ref 150–450)
PLATELET # BLD AUTO: 159 10*3/MM3 (ref 150–450)
PLATELET # BLD AUTO: 165 10*3/MM3 (ref 150–450)
PLATELET # BLD AUTO: 179 10*3/MM3 (ref 150–450)
PLATELET # BLD AUTO: 201 10*3/MM3 (ref 150–450)
PLATELET # BLD AUTO: 206 10*3/MM3 (ref 150–450)
PLATELET # BLD AUTO: 232 10*3/MM3 (ref 150–450)
PMV BLD AUTO: 10.1 FL (ref 6–12)
PMV BLD AUTO: 10.4 FL (ref 6–12)
PMV BLD AUTO: 10.5 FL (ref 6–12)
PMV BLD AUTO: 10.6 FL (ref 6–12)
PMV BLD AUTO: 10.8 FL (ref 6–12)
PMV BLD AUTO: 11.1 FL (ref 6–12)
PMV BLD AUTO: 11.2 FL (ref 6–12)
PO2 BLDA: 141 MM HG (ref 83–108)
PO2 BLDA: 46.4 MM HG (ref 83–108)
PO2 BLDA: 52.1 MM HG (ref 83–108)
PO2 BLDA: 59.8 MM HG (ref 83–108)
POTASSIUM BLD-SCNC: 3.6 MMOL/L (ref 3.5–5.5)
POTASSIUM BLD-SCNC: 3.6 MMOL/L (ref 3.5–5.5)
POTASSIUM BLD-SCNC: 3.8 MMOL/L (ref 3.5–5.5)
POTASSIUM BLD-SCNC: 3.9 MMOL/L (ref 3.5–5.5)
POTASSIUM BLD-SCNC: 4.1 MMOL/L (ref 3.5–5.5)
POTASSIUM BLD-SCNC: 4.3 MMOL/L (ref 3.5–5.5)
POTASSIUM BLD-SCNC: 4.8 MMOL/L (ref 3.5–5.5)
POTASSIUM BLDA-SCNC: 3.3 MMOL/L (ref 3.5–4.9)
PROCALCITONIN SERPL-MCNC: 6.31 NG/ML
PROT SERPL-MCNC: 6.6 G/DL (ref 5.7–8.2)
PROT SERPL-MCNC: 6.9 G/DL (ref 5.7–8.2)
PROT SERPL-MCNC: 7.6 G/DL (ref 5.7–8.2)
PROT SERPL-MCNC: 7.9 G/DL (ref 5.7–8.2)
PROTHROMBIN TIME: 12.2 SECONDS (ref 9.6–11.5)
PROTHROMBIN TIME: 12.6 SECONDS (ref 9.6–11.5)
PROTHROMBIN TIME: 13.9 SECONDS (ref 9.6–11.5)
PROTHROMBIN TIME: 14.7 SECONDS (ref 9.6–11.5)
PROTHROMBIN TIME: 15.1 SECONDS (ref 9.6–11.5)
PROTHROMBIN TIME: 16.3 SECONDS (ref 9.6–11.5)
PROTHROMBIN TIME: 16.3 SECONDS (ref 9.6–11.5)
PROTHROMBIN TIME: 20.2 SECONDS (ref 9.6–11.5)
PROTHROMBIN TIME: 22.5 SECONDS (ref 9.6–11.5)
PROTHROMBIN TIME: 27.3 SECONDS (ref 9.6–11.5)
PROTHROMBIN TIME: 34.8 SECONDS (ref 9.6–11.5)
PROTHROMBIN TIME: 45.5 SECONDS (ref 9.6–11.5)
PROTHROMBIN TIME: 75.1 SECONDS (ref 9.6–11.5)
RBC # BLD AUTO: 2.55 10*6/MM3 (ref 4.2–5.76)
RBC # BLD AUTO: 2.69 10*6/MM3 (ref 4.2–5.76)
RBC # BLD AUTO: 2.7 10*6/MM3 (ref 4.2–5.76)
RBC # BLD AUTO: 2.72 10*6/MM3 (ref 4.2–5.76)
RBC # BLD AUTO: 2.82 10*6/MM3 (ref 4.2–5.76)
RBC # BLD AUTO: 2.92 10*6/MM3 (ref 4.2–5.76)
RBC # BLD AUTO: 2.94 10*6/MM3 (ref 4.2–5.76)
RBC # BLD AUTO: 3.08 10*6/MM3 (ref 4.2–5.76)
RBC # BLD AUTO: 3.41 10*6/MM3 (ref 4.2–5.76)
RBC # FLD AUTO: 1000 /MM3
RBC MORPH BLD: NORMAL
RH BLD: POSITIVE
SAO2 % BLDCOA: 74.8 %
SAO2 % BLDCOA: 74.8 %
SAO2 % BLDCOA: 86.5 %
SODIUM BLD-SCNC: 130 MMOL/L (ref 132–146)
SODIUM BLD-SCNC: 131 MMOL/L (ref 132–146)
SODIUM BLD-SCNC: 131 MMOL/L (ref 132–146)
SODIUM BLD-SCNC: 132 MMOL/L (ref 132–146)
SODIUM BLD-SCNC: 133 MMOL/L (ref 132–146)
SODIUM BLD-SCNC: 134 MMOL/L (ref 132–146)
SODIUM BLD-SCNC: 134 MMOL/L (ref 132–146)
SODIUM BLD-SCNC: 135 MMOL/L (ref 132–146)
SODIUM BLD-SCNC: 136 MMOL/L (ref 132–146)
SODIUM BLD-SCNC: 137 MMOL/L (ref 132–146)
SODIUM BLD-SCNC: 137 MMOL/L (ref 132–146)
SODIUM BLDA-SCNC: 135 MMOL/L (ref 138–146)
T&S EXPIRATION DATE: NORMAL
TROPONIN I SERPL-MCNC: 1.49 NG/ML (ref 0–0.07)
TROPONIN I SERPL-MCNC: 2.32 NG/ML
TROPONIN I SERPL-MCNC: 2.71 NG/ML
TROPONIN I SERPL-MCNC: 2.98 NG/ML
VANCOMYCIN SERPL-MCNC: 23.4 MCG/ML (ref 5–40)
VANCOMYCIN SERPL-MCNC: 37.2 MCG/ML (ref 5–40)
WBC # FLD: 8834 /MM3
WBC MORPH BLD: NORMAL
WBC NRBC COR # BLD: 15.37 10*3/MM3 (ref 3.5–10.8)
WBC NRBC COR # BLD: 4.91 10*3/MM3 (ref 3.5–10.8)
WBC NRBC COR # BLD: 5.39 10*3/MM3 (ref 3.5–10.8)
WBC NRBC COR # BLD: 5.88 10*3/MM3 (ref 3.5–10.8)
WBC NRBC COR # BLD: 5.96 10*3/MM3 (ref 3.5–10.8)
WBC NRBC COR # BLD: 6.25 10*3/MM3 (ref 3.5–10.8)
WBC NRBC COR # BLD: 6.8 10*3/MM3 (ref 3.5–10.8)
WBC NRBC COR # BLD: 7.16 10*3/MM3 (ref 3.5–10.8)
WBC NRBC COR # BLD: 7.73 10*3/MM3 (ref 3.5–10.8)
WHOLE BLOOD HOLD SPECIMEN: NORMAL
WHOLE BLOOD HOLD SPECIMEN: NORMAL

## 2018-01-01 PROCEDURE — 94760 N-INVAS EAR/PLS OXIMETRY 1: CPT

## 2018-01-01 PROCEDURE — C1725 CATH, TRANSLUMIN NON-LASER: HCPCS | Performed by: INTERNAL MEDICINE

## 2018-01-01 PROCEDURE — 5A1935Z RESPIRATORY VENTILATION, LESS THAN 24 CONSECUTIVE HOURS: ICD-10-PCS | Performed by: INTERNAL MEDICINE

## 2018-01-01 PROCEDURE — C1887 CATHETER, GUIDING: HCPCS | Performed by: INTERNAL MEDICINE

## 2018-01-01 PROCEDURE — C1874 STENT, COATED/COV W/DEL SYS: HCPCS | Performed by: INTERNAL MEDICINE

## 2018-01-01 PROCEDURE — 97530 THERAPEUTIC ACTIVITIES: CPT

## 2018-01-01 PROCEDURE — 93312 ECHO TRANSESOPHAGEAL: CPT

## 2018-01-01 PROCEDURE — C9600 PERC DRUG-EL COR STENT SING: HCPCS | Performed by: INTERNAL MEDICINE

## 2018-01-01 PROCEDURE — 25010000002 MIDAZOLAM PER 1 MG: Performed by: INTERNAL MEDICINE

## 2018-01-01 PROCEDURE — 84484 ASSAY OF TROPONIN QUANT: CPT

## 2018-01-01 PROCEDURE — 84145 PROCALCITONIN (PCT): CPT | Performed by: NURSE PRACTITIONER

## 2018-01-01 PROCEDURE — 25010000002 HEPARIN (PORCINE) PER 1000 UNITS: Performed by: INTERNAL MEDICINE

## 2018-01-01 PROCEDURE — 31500 INSERT EMERGENCY AIRWAY: CPT

## 2018-01-01 PROCEDURE — 25010000002 HEPARIN FLUSH (PORCINE) 100 UNIT/ML SOLUTION 5 ML SYRINGE: Performed by: INTERNAL MEDICINE

## 2018-01-01 PROCEDURE — 83036 HEMOGLOBIN GLYCOSYLATED A1C: CPT | Performed by: NURSE PRACTITIONER

## 2018-01-01 PROCEDURE — 93005 ELECTROCARDIOGRAM TRACING: CPT | Performed by: EMERGENCY MEDICINE

## 2018-01-01 PROCEDURE — 70450 CT HEAD/BRAIN W/O DYE: CPT

## 2018-01-01 PROCEDURE — 83605 ASSAY OF LACTIC ACID: CPT | Performed by: INTERNAL MEDICINE

## 2018-01-01 PROCEDURE — 85027 COMPLETE CBC AUTOMATED: CPT | Performed by: NURSE PRACTITIONER

## 2018-01-01 PROCEDURE — 99284 EMERGENCY DEPT VISIT MOD MDM: CPT

## 2018-01-01 PROCEDURE — 85610 PROTHROMBIN TIME: CPT

## 2018-01-01 PROCEDURE — 94660 CPAP INITIATION&MGMT: CPT

## 2018-01-01 PROCEDURE — 36415 COLL VENOUS BLD VENIPUNCTURE: CPT

## 2018-01-01 PROCEDURE — 87205 SMEAR GRAM STAIN: CPT | Performed by: INTERNAL MEDICINE

## 2018-01-01 PROCEDURE — 87070 CULTURE OTHR SPECIMN AEROBIC: CPT | Performed by: EMERGENCY MEDICINE

## 2018-01-01 PROCEDURE — 86901 BLOOD TYPING SEROLOGIC RH(D): CPT | Performed by: NURSE PRACTITIONER

## 2018-01-01 PROCEDURE — 25010000002 GENTAMICIN PER 80 MG: Performed by: INTERNAL MEDICINE

## 2018-01-01 PROCEDURE — 93325 DOPPLER ECHO COLOR FLOW MAPG: CPT

## 2018-01-01 PROCEDURE — 71045 X-RAY EXAM CHEST 1 VIEW: CPT

## 2018-01-01 PROCEDURE — 25010000002 PHENYLEPHRINE 10 MG/ML SOLUTION: Performed by: INTERNAL MEDICINE

## 2018-01-01 PROCEDURE — 87205 SMEAR GRAM STAIN: CPT | Performed by: EMERGENCY MEDICINE

## 2018-01-01 PROCEDURE — 93456 R HRT CORONARY ARTERY ANGIO: CPT | Performed by: INTERNAL MEDICINE

## 2018-01-01 PROCEDURE — 83735 ASSAY OF MAGNESIUM: CPT | Performed by: NURSE PRACTITIONER

## 2018-01-01 PROCEDURE — 85610 PROTHROMBIN TIME: CPT | Performed by: INTERNAL MEDICINE

## 2018-01-01 PROCEDURE — 94799 UNLISTED PULMONARY SVC/PX: CPT

## 2018-01-01 PROCEDURE — 84484 ASSAY OF TROPONIN QUANT: CPT | Performed by: EMERGENCY MEDICINE

## 2018-01-01 PROCEDURE — 25010000002 PHENYLEPHRINE PER 1 ML: Performed by: INTERNAL MEDICINE

## 2018-01-01 PROCEDURE — 84484 ASSAY OF TROPONIN QUANT: CPT | Performed by: NURSE PRACTITIONER

## 2018-01-01 PROCEDURE — 85007 BL SMEAR W/DIFF WBC COUNT: CPT | Performed by: INTERNAL MEDICINE

## 2018-01-01 PROCEDURE — 83605 ASSAY OF LACTIC ACID: CPT | Performed by: NURSE PRACTITIONER

## 2018-01-01 PROCEDURE — C1769 GUIDE WIRE: HCPCS | Performed by: INTERNAL MEDICINE

## 2018-01-01 PROCEDURE — 80047 BASIC METABLC PNL IONIZED CA: CPT

## 2018-01-01 PROCEDURE — 87040 BLOOD CULTURE FOR BACTERIA: CPT | Performed by: NURSE PRACTITIONER

## 2018-01-01 PROCEDURE — 25010000002 HYDROMORPHONE PER 4 MG: Performed by: INTERNAL MEDICINE

## 2018-01-01 PROCEDURE — 99291 CRITICAL CARE FIRST HOUR: CPT | Performed by: INTERNAL MEDICINE

## 2018-01-01 PROCEDURE — 93005 ELECTROCARDIOGRAM TRACING: CPT | Performed by: INTERNAL MEDICINE

## 2018-01-01 PROCEDURE — 25010000002 HYDROMORPHONE PER 4 MG: Performed by: EMERGENCY MEDICINE

## 2018-01-01 PROCEDURE — 0 IOPAMIDOL PER 1 ML: Performed by: INTERNAL MEDICINE

## 2018-01-01 PROCEDURE — P9046 ALBUMIN (HUMAN), 25%, 20 ML: HCPCS | Performed by: EMERGENCY MEDICINE

## 2018-01-01 PROCEDURE — 25010000002 BIVALIRUDIN TRIFLUOROACETATE 250 MG RECONSTITUTED SOLUTION 1 EACH VIAL: Performed by: INTERNAL MEDICINE

## 2018-01-01 PROCEDURE — 89051 BODY FLUID CELL COUNT: CPT | Performed by: EMERGENCY MEDICINE

## 2018-01-01 PROCEDURE — 74176 CT ABD & PELVIS W/O CONTRAST: CPT

## 2018-01-01 PROCEDURE — C1894 INTRO/SHEATH, NON-LASER: HCPCS | Performed by: INTERNAL MEDICINE

## 2018-01-01 PROCEDURE — C1751 CATH, INF, PER/CENT/MIDLINE: HCPCS

## 2018-01-01 PROCEDURE — 99232 SBSQ HOSP IP/OBS MODERATE 35: CPT | Performed by: INTERNAL MEDICINE

## 2018-01-01 PROCEDURE — 99233 SBSQ HOSP IP/OBS HIGH 50: CPT | Performed by: INTERNAL MEDICINE

## 2018-01-01 PROCEDURE — P9046 ALBUMIN (HUMAN), 25%, 20 ML: HCPCS | Performed by: INTERNAL MEDICINE

## 2018-01-01 PROCEDURE — 82805 BLOOD GASES W/O2 SATURATION: CPT | Performed by: NURSE PRACTITIONER

## 2018-01-01 PROCEDURE — 25010000002 PIPERACILLIN SOD-TAZOBACTAM PER 1 G: Performed by: NURSE PRACTITIONER

## 2018-01-01 PROCEDURE — 5A12012 PERFORMANCE OF CARDIAC OUTPUT, SINGLE, MANUAL: ICD-10-PCS | Performed by: INTERNAL MEDICINE

## 2018-01-01 PROCEDURE — 83735 ASSAY OF MAGNESIUM: CPT | Performed by: INTERNAL MEDICINE

## 2018-01-01 PROCEDURE — 85730 THROMBOPLASTIN TIME PARTIAL: CPT | Performed by: NURSE PRACTITIONER

## 2018-01-01 PROCEDURE — 36600 WITHDRAWAL OF ARTERIAL BLOOD: CPT | Performed by: NURSE PRACTITIONER

## 2018-01-01 PROCEDURE — 25010000002 FENTANYL CITRATE (PF) 100 MCG/2ML SOLUTION: Performed by: INTERNAL MEDICINE

## 2018-01-01 PROCEDURE — 85025 COMPLETE CBC W/AUTO DIFF WBC: CPT | Performed by: EMERGENCY MEDICINE

## 2018-01-01 PROCEDURE — 93321 DOPPLER ECHO F-UP/LMTD STD: CPT

## 2018-01-01 PROCEDURE — 85610 PROTHROMBIN TIME: CPT | Performed by: EMERGENCY MEDICINE

## 2018-01-01 PROCEDURE — 85025 COMPLETE CBC W/AUTO DIFF WBC: CPT | Performed by: INTERNAL MEDICINE

## 2018-01-01 PROCEDURE — 82962 GLUCOSE BLOOD TEST: CPT

## 2018-01-01 PROCEDURE — 25010000002 ONDANSETRON PER 1 MG: Performed by: NURSE PRACTITIONER

## 2018-01-01 PROCEDURE — 82271 OCCULT BLOOD OTHER SOURCES: CPT | Performed by: NURSE PRACTITIONER

## 2018-01-01 PROCEDURE — 80053 COMPREHEN METABOLIC PANEL: CPT | Performed by: INTERNAL MEDICINE

## 2018-01-01 PROCEDURE — 80053 COMPREHEN METABOLIC PANEL: CPT | Performed by: EMERGENCY MEDICINE

## 2018-01-01 PROCEDURE — 25010000002 FENTANYL CITRATE (PF) 100 MCG/2ML SOLUTION: Performed by: NURSE PRACTITIONER

## 2018-01-01 PROCEDURE — 80202 ASSAY OF VANCOMYCIN: CPT | Performed by: NURSE PRACTITIONER

## 2018-01-01 PROCEDURE — 85025 COMPLETE CBC W/AUTO DIFF WBC: CPT | Performed by: NURSE PRACTITIONER

## 2018-01-01 PROCEDURE — 25010000002 BIVALIRUDIN TRIFLUOROACETATE 250 MG RECONSTITUTED SOLUTION: Performed by: INTERNAL MEDICINE

## 2018-01-01 PROCEDURE — 92960 CARDIOVERSION ELECTRIC EXT: CPT

## 2018-01-01 PROCEDURE — 94640 AIRWAY INHALATION TREATMENT: CPT

## 2018-01-01 PROCEDURE — C1753 CATH, INTRAVAS ULTRASOUND: HCPCS | Performed by: INTERNAL MEDICINE

## 2018-01-01 PROCEDURE — 3E1M39Z IRRIGATION OF PERITONEAL CAVITY USING DIALYSATE, PERCUTANEOUS APPROACH: ICD-10-PCS | Performed by: INTERNAL MEDICINE

## 2018-01-01 PROCEDURE — 80202 ASSAY OF VANCOMYCIN: CPT | Performed by: INTERNAL MEDICINE

## 2018-01-01 PROCEDURE — 87040 BLOOD CULTURE FOR BACTERIA: CPT | Performed by: INTERNAL MEDICINE

## 2018-01-01 PROCEDURE — 97110 THERAPEUTIC EXERCISES: CPT

## 2018-01-01 PROCEDURE — 94003 VENT MGMT INPAT SUBQ DAY: CPT

## 2018-01-01 PROCEDURE — 97163 PT EVAL HIGH COMPLEX 45 MIN: CPT

## 2018-01-01 PROCEDURE — 85027 COMPLETE CBC AUTOMATED: CPT | Performed by: INTERNAL MEDICINE

## 2018-01-01 PROCEDURE — 82805 BLOOD GASES W/O2 SATURATION: CPT | Performed by: INTERNAL MEDICINE

## 2018-01-01 PROCEDURE — 83690 ASSAY OF LIPASE: CPT | Performed by: NURSE PRACTITIONER

## 2018-01-01 PROCEDURE — 25010000002 ALBUMIN HUMAN 25% PER 50 ML: Performed by: INTERNAL MEDICINE

## 2018-01-01 PROCEDURE — 94002 VENT MGMT INPAT INIT DAY: CPT

## 2018-01-01 PROCEDURE — 87070 CULTURE OTHR SPECIMN AEROBIC: CPT | Performed by: INTERNAL MEDICINE

## 2018-01-01 PROCEDURE — 87186 SC STD MICRODIL/AGAR DIL: CPT | Performed by: EMERGENCY MEDICINE

## 2018-01-01 PROCEDURE — 71250 CT THORAX DX C-: CPT

## 2018-01-01 PROCEDURE — 80048 BASIC METABOLIC PNL TOTAL CA: CPT | Performed by: INTERNAL MEDICINE

## 2018-01-01 PROCEDURE — 86850 RBC ANTIBODY SCREEN: CPT | Performed by: NURSE PRACTITIONER

## 2018-01-01 PROCEDURE — 0BH17EZ INSERTION OF ENDOTRACHEAL AIRWAY INTO TRACHEA, VIA NATURAL OR ARTIFICIAL OPENING: ICD-10-PCS | Performed by: INTERNAL MEDICINE

## 2018-01-01 PROCEDURE — 74018 RADEX ABDOMEN 1 VIEW: CPT

## 2018-01-01 PROCEDURE — 83880 ASSAY OF NATRIURETIC PEPTIDE: CPT | Performed by: NURSE PRACTITIONER

## 2018-01-01 PROCEDURE — 36600 WITHDRAWAL OF ARTERIAL BLOOD: CPT | Performed by: INTERNAL MEDICINE

## 2018-01-01 PROCEDURE — 25010000002 ONDANSETRON PER 1 MG: Performed by: EMERGENCY MEDICINE

## 2018-01-01 PROCEDURE — 83690 ASSAY OF LIPASE: CPT | Performed by: EMERGENCY MEDICINE

## 2018-01-01 PROCEDURE — 97165 OT EVAL LOW COMPLEX 30 MIN: CPT

## 2018-01-01 PROCEDURE — 25010000002 EPINEPHRINE PF 1 MG/10ML SOLUTION PREFILLED SYRINGE: Performed by: INTERNAL MEDICINE

## 2018-01-01 PROCEDURE — 25010000002 VITAMIN K1 PER 1 MG: Performed by: INTERNAL MEDICINE

## 2018-01-01 PROCEDURE — 86900 BLOOD TYPING SEROLOGIC ABO: CPT | Performed by: NURSE PRACTITIONER

## 2018-01-01 PROCEDURE — 84100 ASSAY OF PHOSPHORUS: CPT | Performed by: INTERNAL MEDICINE

## 2018-01-01 PROCEDURE — 85007 BL SMEAR W/DIFF WBC COUNT: CPT | Performed by: NURSE PRACTITIONER

## 2018-01-01 PROCEDURE — 92950 HEART/LUNG RESUSCITATION CPR: CPT

## 2018-01-01 PROCEDURE — 85610 PROTHROMBIN TIME: CPT | Performed by: NURSE PRACTITIONER

## 2018-01-01 PROCEDURE — 80069 RENAL FUNCTION PANEL: CPT | Performed by: INTERNAL MEDICINE

## 2018-01-01 PROCEDURE — 25010000002 VANCOMYCIN PER 500 MG: Performed by: NURSE PRACTITIONER

## 2018-01-01 PROCEDURE — 25010000002 FENTANYL CITRATE (PF) 2500 MCG/50ML SOLUTION: Performed by: NURSE PRACTITIONER

## 2018-01-01 PROCEDURE — 87015 SPECIMEN INFECT AGNT CONCNTJ: CPT | Performed by: INTERNAL MEDICINE

## 2018-01-01 PROCEDURE — 84484 ASSAY OF TROPONIN QUANT: CPT | Performed by: INTERNAL MEDICINE

## 2018-01-01 PROCEDURE — 92978 ENDOLUMINL IVUS OCT C 1ST: CPT | Performed by: INTERNAL MEDICINE

## 2018-01-01 PROCEDURE — 36556 INSERT NON-TUNNEL CV CATH: CPT | Performed by: INTERNAL MEDICINE

## 2018-01-01 PROCEDURE — 02HV33Z INSERTION OF INFUSION DEVICE INTO SUPERIOR VENA CAVA, PERCUTANEOUS APPROACH: ICD-10-PCS | Performed by: INTERNAL MEDICINE

## 2018-01-01 PROCEDURE — 82140 ASSAY OF AMMONIA: CPT | Performed by: INTERNAL MEDICINE

## 2018-01-01 PROCEDURE — 85014 HEMATOCRIT: CPT

## 2018-01-01 PROCEDURE — 80069 RENAL FUNCTION PANEL: CPT | Performed by: NURSE PRACTITIONER

## 2018-01-01 PROCEDURE — 80170 ASSAY OF GENTAMICIN: CPT | Performed by: INTERNAL MEDICINE

## 2018-01-01 PROCEDURE — 25010000002 VANCOMYCIN PER 500 MG: Performed by: INTERNAL MEDICINE

## 2018-01-01 PROCEDURE — 87077 CULTURE AEROBIC IDENTIFY: CPT | Performed by: EMERGENCY MEDICINE

## 2018-01-01 PROCEDURE — 25010000002 ALBUMIN HUMAN 25% PER 50 ML: Performed by: EMERGENCY MEDICINE

## 2018-01-01 PROCEDURE — 92950 HEART/LUNG RESUSCITATION CPR: CPT | Performed by: INTERNAL MEDICINE

## 2018-01-01 DEVICE — XIENCE XPEDITION EVEROLIMUS ELUTING CORONARY STENT SYSTEM 3.00 MM X 28 MM / OVER-THE-WIRE
Type: IMPLANTABLE DEVICE | Status: FUNCTIONAL
Brand: XIENCE XPEDITION

## 2018-01-01 DEVICE — XIENCE SIERRA™ EVEROLIMUS ELUTING CORONARY STENT SYSTEM 3.00 MM X 38 MM / RAPID-EXCHANGE
Type: IMPLANTABLE DEVICE | Status: FUNCTIONAL
Brand: XIENCE SIERRA™

## 2018-01-01 DEVICE — XIENCE SIERRA™ EVEROLIMUS ELUTING CORONARY STENT SYSTEM 3.00 MM X 28 MM / RAPID-EXCHANGE
Type: IMPLANTABLE DEVICE | Status: FUNCTIONAL
Brand: XIENCE SIERRA™

## 2018-01-01 RX ORDER — MIDAZOLAM HYDROCHLORIDE 1 MG/ML
INJECTION INTRAMUSCULAR; INTRAVENOUS
Status: COMPLETED | OUTPATIENT
Start: 2018-01-01 | End: 2018-01-01

## 2018-01-01 RX ORDER — LACTULOSE 10 G/15ML
30 SOLUTION ORAL EVERY 8 HOURS PRN
Status: DISCONTINUED | OUTPATIENT
Start: 2018-01-01 | End: 2018-01-01 | Stop reason: HOSPADM

## 2018-01-01 RX ORDER — SODIUM CHLORIDE, SODIUM LACTATE, CALCIUM CHLORIDE, MAGNESIUM CHLORIDE AND DEXTROSE 2.5; 538; 448; 25.7; 5.08 G/100ML; MG/100ML; MG/100ML; MG/100ML; MG/100ML
2000 INJECTION, SOLUTION INTRAPERITONEAL 3 TIMES DAILY
Status: DISCONTINUED | OUTPATIENT
Start: 2018-01-01 | End: 2018-01-01

## 2018-01-01 RX ORDER — NALOXONE HYDROCHLORIDE 0.4 MG/ML
INJECTION, SOLUTION INTRAMUSCULAR; INTRAVENOUS; SUBCUTANEOUS
Status: DISCONTINUED
Start: 2018-01-01 | End: 2018-01-01 | Stop reason: WASHOUT

## 2018-01-01 RX ORDER — CLOPIDOGREL BISULFATE 75 MG/1
75 TABLET ORAL DAILY
Status: DISCONTINUED | OUTPATIENT
Start: 2018-01-01 | End: 2018-01-01 | Stop reason: HOSPADM

## 2018-01-01 RX ORDER — SODIUM CHLORIDE, SODIUM LACTATE, CALCIUM CHLORIDE, MAGNESIUM CHLORIDE AND DEXTROSE 4.25; 538; 448; 25.7; 5.08 G/100ML; MG/100ML; MG/100ML; MG/100ML; MG/100ML
2000 INJECTION, SOLUTION INTRAPERITONEAL 2 TIMES DAILY
Status: DISCONTINUED | OUTPATIENT
Start: 2018-01-01 | End: 2018-01-01

## 2018-01-01 RX ORDER — ACETAMINOPHEN 325 MG/1
650 TABLET ORAL EVERY 6 HOURS PRN
Status: DISCONTINUED | OUTPATIENT
Start: 2018-01-01 | End: 2018-01-01 | Stop reason: HOSPADM

## 2018-01-01 RX ORDER — PROMETHAZINE HYDROCHLORIDE 25 MG/ML
12.5 INJECTION, SOLUTION INTRAMUSCULAR; INTRAVENOUS EVERY 6 HOURS PRN
Status: DISCONTINUED | OUTPATIENT
Start: 2018-01-01 | End: 2018-01-01 | Stop reason: HOSPADM

## 2018-01-01 RX ORDER — WARFARIN SODIUM 5 MG/1
10 TABLET ORAL
Status: DISCONTINUED | OUTPATIENT
Start: 2018-01-01 | End: 2018-01-01

## 2018-01-01 RX ORDER — FLUMAZENIL 0.1 MG/ML
INJECTION INTRAVENOUS
Status: DISCONTINUED
Start: 2018-01-01 | End: 2018-01-01 | Stop reason: WASHOUT

## 2018-01-01 RX ORDER — ALBUMIN (HUMAN) 12.5 G/50ML
25 SOLUTION INTRAVENOUS ONCE
Status: COMPLETED | OUTPATIENT
Start: 2018-01-01 | End: 2018-01-01

## 2018-01-01 RX ORDER — HYDROMORPHONE HYDROCHLORIDE 1 MG/ML
0.5 INJECTION, SOLUTION INTRAMUSCULAR; INTRAVENOUS; SUBCUTANEOUS
Status: DISCONTINUED | OUTPATIENT
Start: 2018-01-01 | End: 2018-01-01 | Stop reason: HOSPADM

## 2018-01-01 RX ORDER — SEVELAMER HYDROCHLORIDE 800 MG/1
1600 TABLET, FILM COATED ORAL
Status: DISCONTINUED | OUTPATIENT
Start: 2018-01-01 | End: 2018-01-01

## 2018-01-01 RX ORDER — MIDODRINE HYDROCHLORIDE 5 MG/1
10 TABLET ORAL EVERY 8 HOURS
Status: DISCONTINUED | OUTPATIENT
Start: 2018-01-01 | End: 2018-01-01

## 2018-01-01 RX ORDER — HYDROCODONE BITARTRATE AND ACETAMINOPHEN 10; 325 MG/1; MG/1
1 TABLET ORAL ONCE AS NEEDED
Status: COMPLETED | OUTPATIENT
Start: 2018-01-01 | End: 2018-01-01

## 2018-01-01 RX ORDER — FAMOTIDINE 20 MG/1
20 TABLET, FILM COATED ORAL 2 TIMES DAILY
COMMUNITY

## 2018-01-01 RX ORDER — TEMAZEPAM 7.5 MG/1
7.5 CAPSULE ORAL NIGHTLY PRN
Status: DISCONTINUED | OUTPATIENT
Start: 2018-01-01 | End: 2018-01-01 | Stop reason: HOSPADM

## 2018-01-01 RX ORDER — LIDOCAINE HYDROCHLORIDE 10 MG/ML
INJECTION, SOLUTION EPIDURAL; INFILTRATION; INTRACAUDAL; PERINEURAL AS NEEDED
Status: DISCONTINUED | OUTPATIENT
Start: 2018-01-01 | End: 2018-01-01 | Stop reason: HOSPADM

## 2018-01-01 RX ORDER — SODIUM CHLORIDE 9 MG/ML
250 INJECTION, SOLUTION INTRAVENOUS CONTINUOUS
Status: ACTIVE | OUTPATIENT
Start: 2018-01-01 | End: 2018-01-01

## 2018-01-01 RX ORDER — SODIUM CHLORIDE, SODIUM LACTATE, CALCIUM CHLORIDE, MAGNESIUM CHLORIDE AND DEXTROSE 2.5; 538; 448; 25.7; 5.08 G/100ML; MG/100ML; MG/100ML; MG/100ML; MG/100ML
2000 INJECTION, SOLUTION INTRAPERITONEAL 4 TIMES DAILY
Status: DISCONTINUED | OUTPATIENT
Start: 2018-01-01 | End: 2018-01-01

## 2018-01-01 RX ORDER — FEBUXOSTAT 40 MG/1
40 TABLET, FILM COATED ORAL DAILY
Status: DISCONTINUED | OUTPATIENT
Start: 2018-01-01 | End: 2018-01-01

## 2018-01-01 RX ORDER — FEBUXOSTAT 40 MG/1
40 TABLET, FILM COATED ORAL DAILY
COMMUNITY

## 2018-01-01 RX ORDER — WATER 1000 ML/1000ML
INJECTION, SOLUTION INTRAVENOUS
Status: DISCONTINUED
Start: 2018-01-01 | End: 2018-01-01 | Stop reason: HOSPADM

## 2018-01-01 RX ORDER — IPRATROPIUM BROMIDE AND ALBUTEROL SULFATE 2.5; .5 MG/3ML; MG/3ML
3 SOLUTION RESPIRATORY (INHALATION) ONCE
Status: DISCONTINUED | OUTPATIENT
Start: 2018-01-01 | End: 2018-01-01

## 2018-01-01 RX ORDER — BIVALIRUDIN 250 MG/5ML
INJECTION, POWDER, LYOPHILIZED, FOR SOLUTION INTRAVENOUS AS NEEDED
Status: DISCONTINUED | OUTPATIENT
Start: 2018-01-01 | End: 2018-01-01 | Stop reason: HOSPADM

## 2018-01-01 RX ORDER — DOCUSATE SODIUM 100 MG/1
100 CAPSULE, LIQUID FILLED ORAL 2 TIMES DAILY
Status: DISCONTINUED | OUTPATIENT
Start: 2018-01-01 | End: 2018-01-01

## 2018-01-01 RX ORDER — ASPIRIN 325 MG
325 TABLET, DELAYED RELEASE (ENTERIC COATED) ORAL DAILY
Status: DISCONTINUED | OUTPATIENT
Start: 2018-01-01 | End: 2018-01-01 | Stop reason: HOSPADM

## 2018-01-01 RX ORDER — GENTAMICIN SULFATE 80 MG/100ML
80 INJECTION, SOLUTION INTRAVENOUS ONCE
Status: DISCONTINUED | OUTPATIENT
Start: 2018-01-01 | End: 2018-01-01 | Stop reason: ALTCHOICE

## 2018-01-01 RX ORDER — SODIUM PHOSPHATE, DIBASIC AND SODIUM PHOSPHATE, MONOBASIC 7; 19 G/133ML; G/133ML
1 ENEMA RECTAL ONCE
Status: COMPLETED | OUTPATIENT
Start: 2018-01-01 | End: 2018-01-01

## 2018-01-01 RX ORDER — PANTOPRAZOLE SODIUM 40 MG/10ML
40 INJECTION, POWDER, LYOPHILIZED, FOR SOLUTION INTRAVENOUS
Status: DISCONTINUED | OUTPATIENT
Start: 2018-01-01 | End: 2018-01-01 | Stop reason: HOSPADM

## 2018-01-01 RX ORDER — FAMOTIDINE 20 MG/1
20 TABLET, FILM COATED ORAL DAILY
Status: DISCONTINUED | OUTPATIENT
Start: 2018-01-01 | End: 2018-01-01

## 2018-01-01 RX ORDER — FENTANYL CITRATE 50 UG/ML
INJECTION, SOLUTION INTRAMUSCULAR; INTRAVENOUS
Status: DISCONTINUED
Start: 2018-01-01 | End: 2018-01-01 | Stop reason: HOSPADM

## 2018-01-01 RX ORDER — WARFARIN SODIUM 3 MG/1
3 TABLET ORAL
Status: COMPLETED | OUTPATIENT
Start: 2018-01-01 | End: 2018-01-01

## 2018-01-01 RX ORDER — SODIUM CHLORIDE, SODIUM LACTATE, CALCIUM CHLORIDE, MAGNESIUM CHLORIDE AND DEXTROSE 2.5; 538; 448; 25.7; 5.08 G/100ML; MG/100ML; MG/100ML; MG/100ML; MG/100ML
2000 INJECTION, SOLUTION INTRAPERITONEAL ONCE
Status: COMPLETED | OUTPATIENT
Start: 2018-01-01 | End: 2018-01-01

## 2018-01-01 RX ORDER — ONDANSETRON 2 MG/ML
4 INJECTION INTRAMUSCULAR; INTRAVENOUS ONCE
Status: COMPLETED | OUTPATIENT
Start: 2018-01-01 | End: 2018-01-01

## 2018-01-01 RX ORDER — WARFARIN SODIUM 1 MG/1
7.5-8 TABLET ORAL
Status: ON HOLD | COMMUNITY
End: 2018-01-01

## 2018-01-01 RX ORDER — IPRATROPIUM BROMIDE AND ALBUTEROL SULFATE 2.5; .5 MG/3ML; MG/3ML
3 SOLUTION RESPIRATORY (INHALATION)
Status: DISCONTINUED | OUTPATIENT
Start: 2018-01-01 | End: 2018-01-01 | Stop reason: HOSPADM

## 2018-01-01 RX ORDER — FENTANYL CITRATE 50 UG/ML
INJECTION, SOLUTION INTRAMUSCULAR; INTRAVENOUS
Status: COMPLETED | OUTPATIENT
Start: 2018-01-01 | End: 2018-01-01

## 2018-01-01 RX ORDER — SODIUM CHLORIDE 9 MG/ML
250 INJECTION, SOLUTION INTRAVENOUS CONTINUOUS
Status: DISCONTINUED | OUTPATIENT
Start: 2018-01-01 | End: 2018-01-01

## 2018-01-01 RX ORDER — ALPRAZOLAM 0.25 MG/1
0.25 TABLET ORAL 3 TIMES DAILY PRN
Status: DISCONTINUED | OUTPATIENT
Start: 2018-01-01 | End: 2018-01-01 | Stop reason: HOSPADM

## 2018-01-01 RX ORDER — VECURONIUM BROMIDE 1 MG/ML
INJECTION, POWDER, LYOPHILIZED, FOR SOLUTION INTRAVENOUS
Status: DISCONTINUED
Start: 2018-01-01 | End: 2018-01-01 | Stop reason: WASHOUT

## 2018-01-01 RX ORDER — MIDAZOLAM HYDROCHLORIDE 1 MG/ML
INJECTION INTRAMUSCULAR; INTRAVENOUS AS NEEDED
Status: DISCONTINUED | OUTPATIENT
Start: 2018-01-01 | End: 2018-01-01 | Stop reason: HOSPADM

## 2018-01-01 RX ORDER — BISACODYL 5 MG/1
10 TABLET, DELAYED RELEASE ORAL ONCE
Status: DISCONTINUED | OUTPATIENT
Start: 2018-01-01 | End: 2018-01-01

## 2018-01-01 RX ORDER — CHLORHEXIDINE GLUCONATE 0.12 MG/ML
15 RINSE ORAL EVERY 12 HOURS SCHEDULED
Status: DISCONTINUED | OUTPATIENT
Start: 2018-01-01 | End: 2018-01-01 | Stop reason: HOSPADM

## 2018-01-01 RX ORDER — DEXTROSE MONOHYDRATE 25 G/50ML
25 INJECTION, SOLUTION INTRAVENOUS
Status: DISCONTINUED | OUTPATIENT
Start: 2018-01-01 | End: 2018-01-01 | Stop reason: HOSPADM

## 2018-01-01 RX ORDER — PHYTONADIONE 10 MG/ML
2.5 INJECTION, EMULSION INTRAMUSCULAR; INTRAVENOUS; SUBCUTANEOUS ONCE
Status: COMPLETED | OUTPATIENT
Start: 2018-01-01 | End: 2018-01-01

## 2018-01-01 RX ORDER — PHENYLEPHRINE HCL IN 0.9% NACL 0.5 MG/5ML
.5-3 SYRINGE (ML) INTRAVENOUS
Status: DISCONTINUED | OUTPATIENT
Start: 2018-01-01 | End: 2018-01-01

## 2018-01-01 RX ORDER — BISOPROLOL FUMARATE 10 MG/1
10 TABLET, FILM COATED ORAL DAILY
Status: ON HOLD | COMMUNITY
End: 2018-01-01

## 2018-01-01 RX ORDER — GENTAMICIN SULFATE 1 MG/G
1 OINTMENT TOPICAL 3 TIMES DAILY
COMMUNITY

## 2018-01-01 RX ORDER — HEPARIN SODIUM 5000 [USP'U]/ML
5000 INJECTION, SOLUTION INTRAVENOUS; SUBCUTANEOUS EVERY 8 HOURS SCHEDULED
Status: DISCONTINUED | OUTPATIENT
Start: 2018-01-01 | End: 2018-01-01 | Stop reason: HOSPADM

## 2018-01-01 RX ORDER — FENTANYL CITRATE 50 UG/ML
50 INJECTION, SOLUTION INTRAMUSCULAR; INTRAVENOUS
Status: DISCONTINUED | OUTPATIENT
Start: 2018-01-01 | End: 2018-01-01

## 2018-01-01 RX ORDER — HYDROCODONE BITARTRATE AND ACETAMINOPHEN 10; 325 MG/1; MG/1
1 TABLET ORAL EVERY 6 HOURS PRN
COMMUNITY

## 2018-01-01 RX ORDER — MIDAZOLAM HYDROCHLORIDE 1 MG/ML
INJECTION INTRAMUSCULAR; INTRAVENOUS
Status: DISCONTINUED
Start: 2018-01-01 | End: 2018-01-01 | Stop reason: HOSPADM

## 2018-01-01 RX ORDER — SODIUM CHLORIDE 0.9 % (FLUSH) 0.9 %
10 SYRINGE (ML) INJECTION AS NEEDED
Status: DISCONTINUED | OUTPATIENT
Start: 2018-01-01 | End: 2018-01-01 | Stop reason: HOSPADM

## 2018-01-01 RX ORDER — HYDROCODONE BITARTRATE AND ACETAMINOPHEN 5; 325 MG/1; MG/1
1 TABLET ORAL EVERY 4 HOURS PRN
Status: DISCONTINUED | OUTPATIENT
Start: 2018-01-01 | End: 2018-01-01 | Stop reason: HOSPADM

## 2018-01-01 RX ORDER — SENNA AND DOCUSATE SODIUM 50; 8.6 MG/1; MG/1
2 TABLET, FILM COATED ORAL 2 TIMES DAILY
Status: DISCONTINUED | OUTPATIENT
Start: 2018-01-01 | End: 2018-01-01

## 2018-01-01 RX ORDER — SEVELAMER CARBONATE FOR ORAL SUSPENSION 800 MG/1
1600 POWDER, FOR SUSPENSION ORAL
Status: DISCONTINUED | OUTPATIENT
Start: 2018-01-01 | End: 2018-01-01

## 2018-01-01 RX ORDER — FAMOTIDINE 20 MG/1
20 TABLET, FILM COATED ORAL 2 TIMES DAILY
Status: DISCONTINUED | OUTPATIENT
Start: 2018-01-01 | End: 2018-01-01

## 2018-01-01 RX ORDER — DILTIAZEM HCL IN NACL,ISO-OSM 125 MG/125
5-15 PLASTIC BAG, INJECTION (ML) INTRAVENOUS CONTINUOUS
Status: DISCONTINUED | OUTPATIENT
Start: 2018-01-01 | End: 2018-01-01 | Stop reason: RX

## 2018-01-01 RX ORDER — FENTANYL CITRATE 50 UG/ML
INJECTION, SOLUTION INTRAMUSCULAR; INTRAVENOUS AS NEEDED
Status: DISCONTINUED | OUTPATIENT
Start: 2018-01-01 | End: 2018-01-01 | Stop reason: HOSPADM

## 2018-01-01 RX ORDER — MORPHINE SULFATE 2 MG/ML
1 INJECTION, SOLUTION INTRAMUSCULAR; INTRAVENOUS EVERY 4 HOURS PRN
Status: DISCONTINUED | OUTPATIENT
Start: 2018-01-01 | End: 2018-01-01 | Stop reason: HOSPADM

## 2018-01-01 RX ORDER — HYDROCODONE BITARTRATE AND ACETAMINOPHEN 10; 325 MG/1; MG/1
1 TABLET ORAL EVERY 6 HOURS PRN
Status: DISCONTINUED | OUTPATIENT
Start: 2018-01-01 | End: 2018-01-01 | Stop reason: HOSPADM

## 2018-01-01 RX ORDER — CLOPIDOGREL BISULFATE 75 MG/1
TABLET ORAL AS NEEDED
Status: DISCONTINUED | OUTPATIENT
Start: 2018-01-01 | End: 2018-01-01 | Stop reason: HOSPADM

## 2018-01-01 RX ORDER — BISACODYL 10 MG
10 SUPPOSITORY, RECTAL RECTAL DAILY
Status: DISCONTINUED | OUTPATIENT
Start: 2018-01-01 | End: 2018-01-01

## 2018-01-01 RX ORDER — SODIUM CHLORIDE, SODIUM LACTATE, CALCIUM CHLORIDE, MAGNESIUM CHLORIDE AND DEXTROSE 2.5; 538; 448; 25.7; 5.08 G/100ML; MG/100ML; MG/100ML; MG/100ML; MG/100ML
2000 INJECTION, SOLUTION INTRAPERITONEAL 2 TIMES DAILY
Status: DISCONTINUED | OUTPATIENT
Start: 2018-01-01 | End: 2018-01-01

## 2018-01-01 RX ORDER — ONDANSETRON 2 MG/ML
4 INJECTION INTRAMUSCULAR; INTRAVENOUS EVERY 6 HOURS PRN
Status: DISCONTINUED | OUTPATIENT
Start: 2018-01-01 | End: 2018-01-01 | Stop reason: HOSPADM

## 2018-01-01 RX ORDER — LACTULOSE 10 G/15ML
30 SOLUTION ORAL 2 TIMES DAILY
Status: DISCONTINUED | OUTPATIENT
Start: 2018-01-01 | End: 2018-01-01

## 2018-01-01 RX ORDER — LACTULOSE 10 G/15ML
30 SOLUTION ORAL DAILY PRN
Status: DISCONTINUED | OUTPATIENT
Start: 2018-01-01 | End: 2018-01-01

## 2018-01-01 RX ORDER — CINACALCET 30 MG/1
30 TABLET, FILM COATED ORAL NIGHTLY
COMMUNITY

## 2018-01-01 RX ORDER — HEPARIN SODIUM 5000 [USP'U]/ML
5000 INJECTION, SOLUTION INTRAVENOUS; SUBCUTANEOUS EVERY 8 HOURS SCHEDULED
Status: DISCONTINUED | OUTPATIENT
Start: 2018-01-01 | End: 2018-01-01

## 2018-01-01 RX ORDER — CARVEDILOL 6.25 MG/1
6.25 TABLET ORAL 2 TIMES DAILY WITH MEALS
COMMUNITY

## 2018-01-01 RX ORDER — ACETAMINOPHEN 325 MG/1
650 TABLET ORAL EVERY 4 HOURS PRN
Status: DISCONTINUED | OUTPATIENT
Start: 2018-01-01 | End: 2018-01-01 | Stop reason: HOSPADM

## 2018-01-01 RX ORDER — WARFARIN SODIUM 7.5 MG/1
TABLET ORAL
COMMUNITY

## 2018-01-01 RX ORDER — BISACODYL 5 MG/1
10 TABLET, DELAYED RELEASE ORAL DAILY
Status: DISCONTINUED | OUTPATIENT
Start: 2018-01-01 | End: 2018-01-01

## 2018-01-01 RX ORDER — BISACODYL 5 MG/1
10 TABLET, DELAYED RELEASE ORAL DAILY PRN
Status: DISCONTINUED | OUTPATIENT
Start: 2018-01-01 | End: 2018-01-01

## 2018-01-01 RX ORDER — BISOPROLOL FUMARATE 5 MG/1
10 TABLET, FILM COATED ORAL DAILY
Status: ON HOLD | COMMUNITY
End: 2018-01-01

## 2018-01-01 RX ORDER — PANTOPRAZOLE SODIUM 40 MG/10ML
40 INJECTION, POWDER, LYOPHILIZED, FOR SOLUTION INTRAVENOUS
Status: DISCONTINUED | OUTPATIENT
Start: 2018-01-01 | End: 2018-01-01

## 2018-01-01 RX ORDER — NICOTINE POLACRILEX 4 MG
15 LOZENGE BUCCAL
Status: DISCONTINUED | OUTPATIENT
Start: 2018-01-01 | End: 2018-01-01 | Stop reason: HOSPADM

## 2018-01-01 RX ORDER — CLOPIDOGREL BISULFATE 75 MG/1
75 TABLET ORAL DAILY
Qty: 30 TABLET | Refills: 11 | Status: SHIPPED | OUTPATIENT
Start: 2018-01-01

## 2018-01-01 RX ORDER — NALOXONE HCL 0.4 MG/ML
0.4 VIAL (ML) INJECTION
Status: DISCONTINUED | OUTPATIENT
Start: 2018-01-01 | End: 2018-01-01 | Stop reason: HOSPADM

## 2018-01-01 RX ORDER — ESMOLOL HYDROCHLORIDE 10 MG/ML
50-300 INJECTION, SOLUTION INTRAVENOUS
Status: DISCONTINUED | OUTPATIENT
Start: 2018-01-01 | End: 2018-01-01

## 2018-01-01 RX ORDER — IPRATROPIUM BROMIDE AND ALBUTEROL SULFATE 2.5; .5 MG/3ML; MG/3ML
3 SOLUTION RESPIRATORY (INHALATION)
Status: DISCONTINUED | OUTPATIENT
Start: 2018-01-01 | End: 2018-01-01

## 2018-01-01 RX ORDER — WARFARIN SODIUM 5 MG/1
5 TABLET ORAL
Status: DISCONTINUED | OUTPATIENT
Start: 2018-01-01 | End: 2018-01-01 | Stop reason: HOSPADM

## 2018-01-01 RX ORDER — ASPIRIN 81 MG/1
81 TABLET, CHEWABLE ORAL DAILY
Status: DISCONTINUED | OUTPATIENT
Start: 2018-01-01 | End: 2018-01-01 | Stop reason: HOSPADM

## 2018-01-01 RX ORDER — LACTULOSE 10 G/15ML
30 SOLUTION ORAL DAILY
Status: DISCONTINUED | OUTPATIENT
Start: 2018-01-01 | End: 2018-01-01

## 2018-01-01 RX ADMIN — HEPARIN: 100 SYRINGE at 00:06

## 2018-01-01 RX ADMIN — HYDROCODONE BITARTRATE AND ACETAMINOPHEN 1 TABLET: 5; 325 TABLET ORAL at 08:24

## 2018-01-01 RX ADMIN — LACTULOSE 30 G: 10 SOLUTION ORAL at 10:17

## 2018-01-01 RX ADMIN — HYDROMORPHONE HYDROCHLORIDE 0.5 MG: 1 INJECTION, SOLUTION INTRAMUSCULAR; INTRAVENOUS; SUBCUTANEOUS at 02:07

## 2018-01-01 RX ADMIN — VANCOMYCIN HYDROCHLORIDE: 1 INJECTION, POWDER, LYOPHILIZED, FOR SOLUTION INTRAVENOUS at 13:58

## 2018-01-01 RX ADMIN — FEBUXOSTAT 40 MG: 40 TABLET ORAL at 16:45

## 2018-01-01 RX ADMIN — HYDROMORPHONE HYDROCHLORIDE 0.5 MG: 1 INJECTION, SOLUTION INTRAMUSCULAR; INTRAVENOUS; SUBCUTANEOUS at 10:09

## 2018-01-01 RX ADMIN — TAZOBACTAM SODIUM AND PIPERACILLIN SODIUM 3.38 G: 375; 3 INJECTION, SOLUTION INTRAVENOUS at 00:09

## 2018-01-01 RX ADMIN — METOPROLOL TARTRATE 12.5 MG: 25 TABLET, FILM COATED ORAL at 20:12

## 2018-01-01 RX ADMIN — MIDODRINE HYDROCHLORIDE 10 MG: 5 TABLET ORAL at 14:18

## 2018-01-01 RX ADMIN — SODIUM CHLORIDE 500 ML: 9 INJECTION, SOLUTION INTRAVENOUS at 13:15

## 2018-01-01 RX ADMIN — CLOPIDOGREL BISULFATE 75 MG: 75 TABLET ORAL at 08:16

## 2018-01-01 RX ADMIN — HYDROMORPHONE HYDROCHLORIDE 0.5 MG: 1 INJECTION, SOLUTION INTRAMUSCULAR; INTRAVENOUS; SUBCUTANEOUS at 12:27

## 2018-01-01 RX ADMIN — SODIUM CHLORIDE, SODIUM LACTATE, CALCIUM CHLORIDE, MAGNESIUM CHLORIDE AND DEXTROSE 2000 ML: 2.5; 538; 448; 25.7; 5.08 INJECTION, SOLUTION INTRAPERITONEAL at 07:32

## 2018-01-01 RX ADMIN — BISACODYL 10 MG: 10 SUPPOSITORY RECTAL at 15:20

## 2018-01-01 RX ADMIN — CLOPIDOGREL BISULFATE 75 MG: 75 TABLET ORAL at 08:20

## 2018-01-01 RX ADMIN — SODIUM BICARBONATE 50 MEQ: 84 INJECTION, SOLUTION INTRAVENOUS at 07:24

## 2018-01-01 RX ADMIN — SENNOSIDES AND DOCUSATE SODIUM 2 TABLET: 8.6; 5 TABLET ORAL at 21:00

## 2018-01-01 RX ADMIN — FEBUXOSTAT 40 MG: 40 TABLET ORAL at 08:17

## 2018-01-01 RX ADMIN — IPRATROPIUM BROMIDE AND ALBUTEROL SULFATE 3 ML: 2.5; .5 SOLUTION RESPIRATORY (INHALATION) at 00:26

## 2018-01-01 RX ADMIN — SODIUM CHLORIDE, SODIUM LACTATE, CALCIUM CHLORIDE, MAGNESIUM CHLORIDE AND DEXTROSE 2000 ML: 2.5; 538; 448; 25.7; 5.08 INJECTION, SOLUTION INTRAPERITONEAL at 06:02

## 2018-01-01 RX ADMIN — METOPROLOL TARTRATE 12.5 MG: 25 TABLET, FILM COATED ORAL at 20:13

## 2018-01-01 RX ADMIN — ALBUMIN HUMAN 25 G: 0.25 SOLUTION INTRAVENOUS at 17:15

## 2018-01-01 RX ADMIN — TAZOBACTAM SODIUM AND PIPERACILLIN SODIUM 3.38 G: 375; 3 INJECTION, SOLUTION INTRAVENOUS at 11:42

## 2018-01-01 RX ADMIN — PHYTONADIONE 2.5 MG: 10 INJECTION, EMULSION INTRAMUSCULAR; INTRAVENOUS; SUBCUTANEOUS at 16:31

## 2018-01-01 RX ADMIN — MIDODRINE HYDROCHLORIDE 10 MG: 5 TABLET ORAL at 23:18

## 2018-01-01 RX ADMIN — FAMOTIDINE 20 MG: 20 TABLET, FILM COATED ORAL at 08:02

## 2018-01-01 RX ADMIN — Medication 10 ML: at 21:01

## 2018-01-01 RX ADMIN — ASPIRIN 81 MG 81 MG: 81 TABLET ORAL at 08:02

## 2018-01-01 RX ADMIN — HEPARIN: 100 SYRINGE at 23:25

## 2018-01-01 RX ADMIN — MIDODRINE HYDROCHLORIDE 10 MG: 5 TABLET ORAL at 13:45

## 2018-01-01 RX ADMIN — HEPARIN SODIUM 5000 UNITS: 5000 INJECTION, SOLUTION INTRAVENOUS; SUBCUTANEOUS at 21:00

## 2018-01-01 RX ADMIN — EPINEPHRINE 1 MG: 0.1 INJECTION, SOLUTION ENDOTRACHEAL; INTRACARDIAC; INTRAVENOUS at 07:26

## 2018-01-01 RX ADMIN — SENNOSIDES AND DOCUSATE SODIUM 2 TABLET: 8.6; 5 TABLET ORAL at 08:52

## 2018-01-01 RX ADMIN — ASPIRIN 81 MG 81 MG: 81 TABLET ORAL at 08:50

## 2018-01-01 RX ADMIN — METOPROLOL TARTRATE 12.5 MG: 25 TABLET, FILM COATED ORAL at 21:00

## 2018-01-01 RX ADMIN — METOPROLOL TARTRATE 12.5 MG: 25 TABLET, FILM COATED ORAL at 08:20

## 2018-01-01 RX ADMIN — HYDROCODONE BITARTRATE AND ACETAMINOPHEN 1 TABLET: 10; 325 TABLET ORAL at 21:44

## 2018-01-01 RX ADMIN — SEVELAMER CARBONATE 1.6 G: 800 POWDER, FOR SUSPENSION ORAL at 08:06

## 2018-01-01 RX ADMIN — MIDODRINE HYDROCHLORIDE 10 MG: 5 TABLET ORAL at 21:11

## 2018-01-01 RX ADMIN — SENNOSIDES AND DOCUSATE SODIUM 2 TABLET: 8.6; 5 TABLET ORAL at 12:13

## 2018-01-01 RX ADMIN — LACTULOSE 30 G: 10 SOLUTION ORAL at 18:04

## 2018-01-01 RX ADMIN — VANCOMYCIN HYDROCHLORIDE 3000 MG: 5 INJECTION, POWDER, LYOPHILIZED, FOR SOLUTION INTRAVENOUS at 19:20

## 2018-01-01 RX ADMIN — SODIUM CHLORIDE, SODIUM LACTATE, CALCIUM CHLORIDE, MAGNESIUM CHLORIDE AND DEXTROSE 2000 ML: 4.25; 538; 448; 25.7; 5.08 INJECTION, SOLUTION INTRAPERITONEAL at 20:17

## 2018-01-01 RX ADMIN — SENNOSIDES AND DOCUSATE SODIUM 2 TABLET: 8.6; 5 TABLET ORAL at 09:06

## 2018-01-01 RX ADMIN — CLOPIDOGREL BISULFATE 75 MG: 75 TABLET ORAL at 16:52

## 2018-01-01 RX ADMIN — SODIUM CHLORIDE, SODIUM LACTATE, CALCIUM CHLORIDE, MAGNESIUM CHLORIDE AND DEXTROSE 2000 ML: 2.5; 538; 448; 25.7; 5.08 INJECTION, SOLUTION INTRAPERITONEAL at 14:50

## 2018-01-01 RX ADMIN — FENTANYL CITRATE 50 MCG: 50 INJECTION INTRAMUSCULAR; INTRAVENOUS at 21:17

## 2018-01-01 RX ADMIN — FAMOTIDINE 20 MG: 20 TABLET, FILM COATED ORAL at 21:07

## 2018-01-01 RX ADMIN — MIDODRINE HYDROCHLORIDE 10 MG: 5 TABLET ORAL at 06:00

## 2018-01-01 RX ADMIN — SEVELAMER HYDROCHLORIDE 1600 MG: 800 TABLET, FILM COATED ORAL at 08:17

## 2018-01-01 RX ADMIN — EPINEPHRINE 1 MG: 0.1 INJECTION, SOLUTION ENDOTRACHEAL; INTRACARDIAC; INTRAVENOUS at 06:51

## 2018-01-01 RX ADMIN — ESMOLOL HYDROCHLORIDE 50 MCG/KG/MIN: 10 INJECTION INTRAVENOUS at 15:45

## 2018-01-01 RX ADMIN — SODIUM PHOSPHATE, DIBASIC AND SODIUM PHOSPHATE, MONOBASIC 1 ENEMA: 7; 19 ENEMA RECTAL at 05:41

## 2018-01-01 RX ADMIN — HYDROCODONE BITARTRATE AND ACETAMINOPHEN 1 TABLET: 5; 325 TABLET ORAL at 08:47

## 2018-01-01 RX ADMIN — WARFARIN SODIUM 10 MG: 5 TABLET ORAL at 20:13

## 2018-01-01 RX ADMIN — FEBUXOSTAT 40 MG: 40 TABLET ORAL at 08:48

## 2018-01-01 RX ADMIN — HYDROCODONE BITARTRATE AND ACETAMINOPHEN 1 TABLET: 10; 325 TABLET ORAL at 14:37

## 2018-01-01 RX ADMIN — FAMOTIDINE 20 MG: 20 TABLET, FILM COATED ORAL at 08:16

## 2018-01-01 RX ADMIN — SODIUM CHLORIDE, SODIUM LACTATE, CALCIUM CHLORIDE, MAGNESIUM CHLORIDE AND DEXTROSE 2000 ML: 2.5; 538; 448; 25.7; 5.08 INJECTION, SOLUTION INTRAPERITONEAL at 06:32

## 2018-01-01 RX ADMIN — CLOPIDOGREL BISULFATE 75 MG: 75 TABLET ORAL at 08:53

## 2018-01-01 RX ADMIN — HYDROMORPHONE HYDROCHLORIDE 1 MG: 1 INJECTION, SOLUTION INTRAMUSCULAR; INTRAVENOUS; SUBCUTANEOUS at 13:16

## 2018-01-01 RX ADMIN — HEPARIN SODIUM 5000 UNITS: 5000 INJECTION, SOLUTION INTRAVENOUS; SUBCUTANEOUS at 14:39

## 2018-01-01 RX ADMIN — SODIUM CHLORIDE, SODIUM LACTATE, CALCIUM CHLORIDE, MAGNESIUM CHLORIDE AND DEXTROSE 2000 ML: 4.25; 538; 448; 25.7; 5.08 INJECTION, SOLUTION INTRAPERITONEAL at 20:08

## 2018-01-01 RX ADMIN — ASPIRIN 81 MG 81 MG: 81 TABLET ORAL at 08:19

## 2018-01-01 RX ADMIN — TAZOBACTAM SODIUM AND PIPERACILLIN SODIUM 3.38 G: 375; 3 INJECTION, SOLUTION INTRAVENOUS at 16:46

## 2018-01-01 RX ADMIN — MIDODRINE HYDROCHLORIDE 10 MG: 5 TABLET ORAL at 21:01

## 2018-01-01 RX ADMIN — WARFARIN SODIUM 3 MG: 3 TABLET ORAL at 18:22

## 2018-01-01 RX ADMIN — EPINEPHRINE 1 MG: 0.1 INJECTION, SOLUTION ENDOTRACHEAL; INTRACARDIAC; INTRAVENOUS at 07:24

## 2018-01-01 RX ADMIN — HYDROCODONE BITARTRATE AND ACETAMINOPHEN 1 TABLET: 10; 325 TABLET ORAL at 20:13

## 2018-01-01 RX ADMIN — FAMOTIDINE 20 MG: 20 TABLET, FILM COATED ORAL at 08:20

## 2018-01-01 RX ADMIN — SODIUM CHLORIDE, SODIUM LACTATE, CALCIUM CHLORIDE, MAGNESIUM CHLORIDE AND DEXTROSE 2000 ML: 2.5; 538; 448; 25.7; 5.08 INJECTION, SOLUTION INTRAPERITONEAL at 16:57

## 2018-01-01 RX ADMIN — SENNOSIDES AND DOCUSATE SODIUM 2 TABLET: 8.6; 5 TABLET ORAL at 20:07

## 2018-01-01 RX ADMIN — SODIUM CHLORIDE, SODIUM LACTATE, CALCIUM CHLORIDE, MAGNESIUM CHLORIDE AND DEXTROSE 2000 ML: 4.25; 538; 448; 25.7; 5.08 INJECTION, SOLUTION INTRAPERITONEAL at 12:25

## 2018-01-01 RX ADMIN — ASPIRIN 81 MG 81 MG: 81 TABLET ORAL at 08:16

## 2018-01-01 RX ADMIN — HYDROMORPHONE HYDROCHLORIDE 0.5 MG: 1 INJECTION, SOLUTION INTRAMUSCULAR; INTRAVENOUS; SUBCUTANEOUS at 06:50

## 2018-01-01 RX ADMIN — SODIUM CHLORIDE, SODIUM LACTATE, CALCIUM CHLORIDE, MAGNESIUM CHLORIDE AND DEXTROSE 2000 ML: 4.25; 538; 448; 25.7; 5.08 INJECTION, SOLUTION INTRAPERITONEAL at 11:56

## 2018-01-01 RX ADMIN — TAZOBACTAM SODIUM AND PIPERACILLIN SODIUM 3.38 G: 375; 3 INJECTION, SOLUTION INTRAVENOUS at 23:02

## 2018-01-01 RX ADMIN — SODIUM CHLORIDE 1000 ML: 9 INJECTION, SOLUTION INTRAVENOUS at 04:12

## 2018-01-01 RX ADMIN — HYDROCODONE BITARTRATE AND ACETAMINOPHEN 1 TABLET: 10; 325 TABLET ORAL at 08:18

## 2018-01-01 RX ADMIN — MIDODRINE HYDROCHLORIDE 10 MG: 5 TABLET ORAL at 15:14

## 2018-01-01 RX ADMIN — HYDROCODONE BITARTRATE AND ACETAMINOPHEN 1 TABLET: 10; 325 TABLET ORAL at 23:38

## 2018-01-01 RX ADMIN — FENTANYL CITRATE 50 MCG: 50 INJECTION INTRAMUSCULAR; INTRAVENOUS at 00:32

## 2018-01-01 RX ADMIN — ONDANSETRON 4 MG: 2 INJECTION INTRAMUSCULAR; INTRAVENOUS at 21:12

## 2018-01-01 RX ADMIN — ASPIRIN 81 MG 81 MG: 81 TABLET ORAL at 16:52

## 2018-01-01 RX ADMIN — HEPARIN: 100 SYRINGE at 00:00

## 2018-01-01 RX ADMIN — PHENYLEPHRINE HYDROCHLORIDE 0.5 MCG/KG/MIN: 10 INJECTION INTRAVENOUS at 16:29

## 2018-01-01 RX ADMIN — PHENYLEPHRINE HYDROCHLORIDE 2 MCG/KG/MIN: 10 INJECTION INTRAVENOUS at 22:00

## 2018-01-01 RX ADMIN — PHENYLEPHRINE HYDROCHLORIDE 2 MCG/KG/MIN: 10 INJECTION INTRAVENOUS at 06:49

## 2018-01-01 RX ADMIN — HEPARIN SODIUM 5000 UNITS: 5000 INJECTION, SOLUTION INTRAVENOUS; SUBCUTANEOUS at 15:15

## 2018-01-01 RX ADMIN — ESMOLOL HYDROCHLORIDE 50 MCG/KG/MIN: 10 INJECTION INTRAVENOUS at 02:12

## 2018-01-01 RX ADMIN — MIDODRINE HYDROCHLORIDE 10 MG: 5 TABLET ORAL at 06:06

## 2018-01-01 RX ADMIN — FENTANYL CITRATE 50 MCG/HR: 50 INJECTION, SOLUTION INTRAMUSCULAR; INTRAVENOUS at 04:10

## 2018-01-01 RX ADMIN — SODIUM BICARBONATE 50 MEQ: 84 INJECTION, SOLUTION INTRAVENOUS at 06:55

## 2018-01-01 RX ADMIN — SENNOSIDES AND DOCUSATE SODIUM 2 TABLET: 8.6; 5 TABLET ORAL at 08:17

## 2018-01-01 RX ADMIN — SEVELAMER HYDROCHLORIDE 1600 MG: 800 TABLET, FILM COATED ORAL at 11:42

## 2018-01-01 RX ADMIN — SODIUM CHLORIDE, SODIUM LACTATE, CALCIUM CHLORIDE, MAGNESIUM CHLORIDE AND DEXTROSE 2000 ML: 2.5; 538; 448; 25.7; 5.08 INJECTION, SOLUTION INTRAPERITONEAL at 10:52

## 2018-01-01 RX ADMIN — SEVELAMER HYDROCHLORIDE 1600 MG: 800 TABLET, FILM COATED ORAL at 18:06

## 2018-01-01 RX ADMIN — MIDODRINE HYDROCHLORIDE 10 MG: 5 TABLET ORAL at 20:14

## 2018-01-01 RX ADMIN — WARFARIN SODIUM 5 MG: 5 TABLET ORAL at 18:34

## 2018-01-01 RX ADMIN — MIDODRINE HYDROCHLORIDE 10 MG: 5 TABLET ORAL at 21:00

## 2018-01-01 RX ADMIN — SODIUM CHLORIDE, SODIUM LACTATE, CALCIUM CHLORIDE, MAGNESIUM CHLORIDE AND DEXTROSE 2000 ML: 2.5; 538; 448; 25.7; 5.08 INJECTION, SOLUTION INTRAPERITONEAL at 16:00

## 2018-01-01 RX ADMIN — SEVELAMER HYDROCHLORIDE 1600 MG: 800 TABLET, FILM COATED ORAL at 11:35

## 2018-01-01 RX ADMIN — SODIUM CHLORIDE, SODIUM LACTATE, CALCIUM CHLORIDE, MAGNESIUM CHLORIDE AND DEXTROSE 2000 ML: 2.5; 538; 448; 25.7; 5.08 INJECTION, SOLUTION INTRAPERITONEAL at 16:40

## 2018-01-01 RX ADMIN — MIDODRINE HYDROCHLORIDE 10 MG: 5 TABLET ORAL at 06:09

## 2018-01-01 RX ADMIN — FENTANYL CITRATE 50 MCG: 50 INJECTION INTRAMUSCULAR; INTRAVENOUS at 06:07

## 2018-01-01 RX ADMIN — MIDODRINE HYDROCHLORIDE 10 MG: 5 TABLET ORAL at 21:04

## 2018-01-01 RX ADMIN — FEBUXOSTAT 40 MG: 40 TABLET ORAL at 08:53

## 2018-01-01 RX ADMIN — FAMOTIDINE 20 MG: 20 TABLET, FILM COATED ORAL at 08:15

## 2018-01-01 RX ADMIN — SENNOSIDES AND DOCUSATE SODIUM 2 TABLET: 8.6; 5 TABLET ORAL at 08:19

## 2018-01-01 RX ADMIN — HYDROCODONE BITARTRATE AND ACETAMINOPHEN 1 TABLET: 10; 325 TABLET ORAL at 19:26

## 2018-01-01 RX ADMIN — METOPROLOL TARTRATE 12.5 MG: 25 TABLET, FILM COATED ORAL at 08:14

## 2018-01-01 RX ADMIN — MIDODRINE HYDROCHLORIDE 10 MG: 5 TABLET ORAL at 05:32

## 2018-01-01 RX ADMIN — ESMOLOL HYDROCHLORIDE 50 MCG/KG/MIN: 10 INJECTION INTRAVENOUS at 21:02

## 2018-01-01 RX ADMIN — METOPROLOL TARTRATE 12.5 MG: 25 TABLET, FILM COATED ORAL at 21:11

## 2018-01-01 RX ADMIN — HEPARIN SODIUM 5000 UNITS: 5000 INJECTION, SOLUTION INTRAVENOUS; SUBCUTANEOUS at 20:15

## 2018-01-01 RX ADMIN — FEBUXOSTAT 40 MG: 40 TABLET ORAL at 09:07

## 2018-01-01 RX ADMIN — ONDANSETRON 4 MG: 2 INJECTION INTRAMUSCULAR; INTRAVENOUS at 21:01

## 2018-01-01 RX ADMIN — HYDROMORPHONE HYDROCHLORIDE 0.5 MG: 1 INJECTION, SOLUTION INTRAMUSCULAR; INTRAVENOUS; SUBCUTANEOUS at 22:29

## 2018-01-01 RX ADMIN — SENNOSIDES AND DOCUSATE SODIUM 2 TABLET: 8.6; 5 TABLET ORAL at 20:12

## 2018-01-01 RX ADMIN — SODIUM CHLORIDE, SODIUM LACTATE, CALCIUM CHLORIDE, MAGNESIUM CHLORIDE AND DEXTROSE 2000 ML: 4.25; 538; 448; 25.7; 5.08 INJECTION, SOLUTION INTRAPERITONEAL at 12:27

## 2018-01-01 RX ADMIN — HYDROCODONE BITARTRATE AND ACETAMINOPHEN 1 TABLET: 10; 325 TABLET ORAL at 08:02

## 2018-01-01 RX ADMIN — FEBUXOSTAT 40 MG: 40 TABLET ORAL at 08:16

## 2018-01-01 RX ADMIN — HEPARIN: 100 SYRINGE at 06:05

## 2018-01-01 RX ADMIN — HEPARIN: 100 SYRINGE at 00:14

## 2018-01-01 RX ADMIN — ATROPINE SULFATE: 0.1 INJECTION, SOLUTION ENDOTRACHEAL; INTRAMUSCULAR; INTRAVENOUS; SUBCUTANEOUS at 07:00

## 2018-01-01 RX ADMIN — METOPROLOL TARTRATE 12.5 MG: 25 TABLET, FILM COATED ORAL at 20:07

## 2018-01-01 RX ADMIN — HYDROCODONE BITARTRATE AND ACETAMINOPHEN 1 TABLET: 10; 325 TABLET ORAL at 05:16

## 2018-01-01 RX ADMIN — WARFARIN SODIUM 10 MG: 5 TABLET ORAL at 17:12

## 2018-01-01 RX ADMIN — CLOPIDOGREL BISULFATE 75 MG: 75 TABLET ORAL at 08:02

## 2018-01-01 RX ADMIN — FENTANYL CITRATE 50 MCG: 50 INJECTION, SOLUTION INTRAMUSCULAR; INTRAVENOUS at 14:55

## 2018-01-01 RX ADMIN — FEBUXOSTAT 40 MG: 40 TABLET ORAL at 08:19

## 2018-01-01 RX ADMIN — SEVELAMER CARBONATE 1.6 G: 800 POWDER, FOR SUSPENSION ORAL at 17:58

## 2018-01-01 RX ADMIN — HYDROCODONE BITARTRATE AND ACETAMINOPHEN 1 TABLET: 10; 325 TABLET ORAL at 00:43

## 2018-01-01 RX ADMIN — SENNOSIDES AND DOCUSATE SODIUM 2 TABLET: 8.6; 5 TABLET ORAL at 08:16

## 2018-01-01 RX ADMIN — TAZOBACTAM SODIUM AND PIPERACILLIN SODIUM 3.38 G: 375; 3 INJECTION, SOLUTION INTRAVENOUS at 00:00

## 2018-01-01 RX ADMIN — ALBUMIN HUMAN 25 G: 0.25 SOLUTION INTRAVENOUS at 13:51

## 2018-01-01 RX ADMIN — EPINEPHRINE 1 MG: 0.1 INJECTION, SOLUTION ENDOTRACHEAL; INTRACARDIAC; INTRAVENOUS at 07:28

## 2018-01-01 RX ADMIN — SODIUM CHLORIDE, SODIUM LACTATE, CALCIUM CHLORIDE, MAGNESIUM CHLORIDE AND DEXTROSE 2000 ML: 2.5; 538; 448; 25.7; 5.08 INJECTION, SOLUTION INTRAPERITONEAL at 05:46

## 2018-01-01 RX ADMIN — HYDROCODONE BITARTRATE AND ACETAMINOPHEN 1 TABLET: 10; 325 TABLET ORAL at 20:16

## 2018-01-01 RX ADMIN — LACTULOSE 30 G: 10 SOLUTION ORAL at 08:17

## 2018-01-01 RX ADMIN — GENTAMICIN SULFATE: 40 INJECTION, SOLUTION INTRAMUSCULAR; INTRAVENOUS at 16:32

## 2018-01-01 RX ADMIN — HYDROCODONE BITARTRATE AND ACETAMINOPHEN 1 TABLET: 10; 325 TABLET ORAL at 08:15

## 2018-01-01 RX ADMIN — MIDAZOLAM HYDROCHLORIDE 2 MG: 1 INJECTION, SOLUTION INTRAMUSCULAR; INTRAVENOUS at 14:53

## 2018-01-01 RX ADMIN — METOPROLOL TARTRATE 12.5 MG: 25 TABLET, FILM COATED ORAL at 08:16

## 2018-01-01 RX ADMIN — MIDODRINE HYDROCHLORIDE 10 MG: 5 TABLET ORAL at 06:12

## 2018-01-01 RX ADMIN — NOREPINEPHRINE BITARTRATE 0.1 MCG/KG/MIN: 1 INJECTION, SOLUTION, CONCENTRATE INTRAVENOUS at 05:22

## 2018-01-01 RX ADMIN — SEVELAMER HYDROCHLORIDE 1600 MG: 800 TABLET, FILM COATED ORAL at 08:16

## 2018-01-01 RX ADMIN — SENNOSIDES AND DOCUSATE SODIUM 2 TABLET: 8.6; 5 TABLET ORAL at 20:14

## 2018-01-01 RX ADMIN — SEVELAMER CARBONATE 1.6 G: 800 POWDER, FOR SUSPENSION ORAL at 17:12

## 2018-01-01 RX ADMIN — FENTANYL CITRATE 50 MCG: 50 INJECTION, SOLUTION INTRAMUSCULAR; INTRAVENOUS at 14:53

## 2018-01-01 RX ADMIN — MIDODRINE HYDROCHLORIDE 10 MG: 5 TABLET ORAL at 13:26

## 2018-01-01 RX ADMIN — FAMOTIDINE 20 MG: 20 TABLET, FILM COATED ORAL at 08:50

## 2018-01-01 RX ADMIN — DOCUSATE SODIUM 100 MG: 100 CAPSULE, LIQUID FILLED ORAL at 08:15

## 2018-01-01 RX ADMIN — SODIUM CHLORIDE, SODIUM LACTATE, CALCIUM CHLORIDE, MAGNESIUM CHLORIDE AND DEXTROSE 2000 ML: 2.5; 538; 448; 25.7; 5.08 INJECTION, SOLUTION INTRAPERITONEAL at 20:17

## 2018-01-01 RX ADMIN — HYDROCODONE BITARTRATE AND ACETAMINOPHEN 1 TABLET: 10; 325 TABLET ORAL at 02:15

## 2018-01-01 RX ADMIN — MIDODRINE HYDROCHLORIDE 10 MG: 5 TABLET ORAL at 21:08

## 2018-01-01 RX ADMIN — ASPIRIN 325 MG: 325 TABLET, DELAYED RELEASE ORAL at 12:39

## 2018-01-01 RX ADMIN — METOPROLOL TARTRATE 12.5 MG: 25 TABLET, FILM COATED ORAL at 09:06

## 2018-01-01 RX ADMIN — SODIUM CHLORIDE, SODIUM LACTATE, CALCIUM CHLORIDE, MAGNESIUM CHLORIDE AND DEXTROSE 2000 ML: 2.5; 538; 448; 25.7; 5.08 INJECTION, SOLUTION INTRAPERITONEAL at 12:05

## 2018-01-01 RX ADMIN — PHENYLEPHRINE HYDROCHLORIDE 0.5 MCG/KG/MIN: 10 INJECTION INTRAVENOUS at 17:07

## 2018-01-01 RX ADMIN — MIDODRINE HYDROCHLORIDE 10 MG: 5 TABLET ORAL at 15:35

## 2018-01-01 RX ADMIN — PANTOPRAZOLE SODIUM 40 MG: 40 INJECTION, POWDER, FOR SOLUTION INTRAVENOUS at 06:08

## 2018-01-01 RX ADMIN — ALBUMIN HUMAN 25 G: 0.25 SOLUTION INTRAVENOUS at 09:56

## 2018-01-01 RX ADMIN — HYDROCODONE BITARTRATE AND ACETAMINOPHEN 1 TABLET: 10; 325 TABLET ORAL at 19:29

## 2018-01-01 RX ADMIN — BISACODYL 10 MG: 5 TABLET, COATED ORAL at 19:33

## 2018-01-01 RX ADMIN — FAMOTIDINE 20 MG: 20 TABLET, FILM COATED ORAL at 20:13

## 2018-01-01 RX ADMIN — SODIUM CHLORIDE, SODIUM LACTATE, CALCIUM CHLORIDE, MAGNESIUM CHLORIDE AND DEXTROSE 2000 ML: 4.25; 538; 448; 25.7; 5.08 INJECTION, SOLUTION INTRAPERITONEAL at 20:09

## 2018-01-01 RX ADMIN — TAZOBACTAM SODIUM AND PIPERACILLIN SODIUM 3.38 G: 375; 3 INJECTION, SOLUTION INTRAVENOUS at 12:13

## 2018-01-01 RX ADMIN — CLOPIDOGREL BISULFATE 75 MG: 75 TABLET ORAL at 09:06

## 2018-01-01 RX ADMIN — HEPARIN: 100 SYRINGE at 00:15

## 2018-01-01 RX ADMIN — MIDAZOLAM HYDROCHLORIDE 2 MG: 1 INJECTION, SOLUTION INTRAMUSCULAR; INTRAVENOUS at 14:55

## 2018-01-01 RX ADMIN — ASPIRIN 325 MG: 325 TABLET, DELAYED RELEASE ORAL at 07:01

## 2018-01-01 RX ADMIN — SEVELAMER HYDROCHLORIDE 1600 MG: 800 TABLET, FILM COATED ORAL at 09:07

## 2018-01-01 RX ADMIN — METOPROLOL TARTRATE 12.5 MG: 25 TABLET, FILM COATED ORAL at 08:50

## 2018-01-01 RX ADMIN — SEVELAMER HYDROCHLORIDE 1600 MG: 800 TABLET, FILM COATED ORAL at 18:22

## 2018-01-01 RX ADMIN — WARFARIN SODIUM 5 MG: 5 TABLET ORAL at 17:38

## 2018-01-01 RX ADMIN — SENNOSIDES AND DOCUSATE SODIUM 2 TABLET: 8.6; 5 TABLET ORAL at 21:10

## 2018-01-01 RX ADMIN — HEPARIN SODIUM 5000 UNITS: 5000 INJECTION, SOLUTION INTRAVENOUS; SUBCUTANEOUS at 06:05

## 2018-01-01 RX ADMIN — HYDROCODONE BITARTRATE AND ACETAMINOPHEN 1 TABLET: 5; 325 TABLET ORAL at 17:59

## 2018-01-01 RX ADMIN — MIDODRINE HYDROCHLORIDE 10 MG: 5 TABLET ORAL at 14:37

## 2018-01-01 RX ADMIN — MIDODRINE HYDROCHLORIDE 10 MG: 5 TABLET ORAL at 06:05

## 2018-01-01 RX ADMIN — ONDANSETRON 4 MG: 2 INJECTION INTRAMUSCULAR; INTRAVENOUS at 13:15

## 2018-01-01 RX ADMIN — ACETAMINOPHEN 650 MG: 325 TABLET, FILM COATED ORAL at 06:29

## 2018-01-01 RX ADMIN — ASPIRIN 81 MG 81 MG: 81 TABLET ORAL at 09:06

## 2018-01-01 RX ADMIN — MIDODRINE HYDROCHLORIDE 10 MG: 5 TABLET ORAL at 06:26

## 2018-01-01 RX ADMIN — HYDROCODONE BITARTRATE AND ACETAMINOPHEN 1 TABLET: 10; 325 TABLET ORAL at 06:01

## 2018-01-01 RX ADMIN — LACTULOSE 30 G: 10 SOLUTION ORAL at 14:26

## 2018-01-01 RX ADMIN — FAMOTIDINE 20 MG: 20 TABLET, FILM COATED ORAL at 09:06

## 2018-06-21 PROBLEM — I34.8: Status: ACTIVE | Noted: 2018-01-01

## 2018-06-21 PROBLEM — I50.20 SYSTOLIC CONGESTIVE HEART FAILURE (HCC): Status: ACTIVE | Noted: 2018-01-01

## 2018-07-11 PROBLEM — I20.0 UNSTABLE ANGINA PECTORIS (HCC): Status: ACTIVE | Noted: 2018-01-01

## 2018-07-21 PROBLEM — K65.9 PERITONITIS (HCC): Status: ACTIVE | Noted: 2018-01-01

## 2018-07-21 PROBLEM — I25.10 CORONARY ARTERY DISEASE: Status: ACTIVE | Noted: 2018-01-01

## 2018-07-21 PROBLEM — I21.4 NSTEMI (NON-ST ELEVATED MYOCARDIAL INFARCTION) (HCC): Status: ACTIVE | Noted: 2018-01-01

## 2018-07-21 PROBLEM — I34.0 MITRAL REGURGITATION: Status: ACTIVE | Noted: 2018-01-01

## 2018-07-27 PROBLEM — G93.41 METABOLIC ENCEPHALOPATHY: Status: ACTIVE | Noted: 2018-01-01

## 2018-07-29 PROBLEM — T17.910A ASPIRATION OF VOMIT: Status: ACTIVE | Noted: 2018-01-01

## 2018-07-29 PROBLEM — R65.21 SHOCK, SEPTIC (HCC): Status: ACTIVE | Noted: 2018-01-01

## 2018-07-29 PROBLEM — K92.2 GI BLEED: Status: ACTIVE | Noted: 2018-01-01

## 2018-07-29 PROBLEM — K56.609 SBO (SMALL BOWEL OBSTRUCTION) (HCC): Status: ACTIVE | Noted: 2018-01-01

## 2018-07-29 PROBLEM — J96.01 ACUTE RESPIRATORY FAILURE WITH HYPOXIA (HCC): Status: ACTIVE | Noted: 2018-01-01

## 2018-07-29 PROBLEM — J96.02 ACUTE RESPIRATORY FAILURE WITH HYPOXIA AND HYPERCARBIA (HCC): Status: ACTIVE | Noted: 2018-01-01

## 2018-07-29 PROBLEM — A41.9 SHOCK, SEPTIC (HCC): Status: ACTIVE | Noted: 2018-01-01

## 2018-07-29 PROBLEM — A41.9 SEPSIS (HCC): Status: ACTIVE | Noted: 2018-01-01

## 2018-07-30 LAB
BACTERIA SPEC AEROBE CULT: NORMAL
BACTERIA SPEC AEROBE CULT: NORMAL

## 2018-08-01 LAB
BACTERIA FLD CULT: NORMAL
GRAM STN SPEC: NORMAL

## 2018-08-03 LAB
BACTERIA SPEC AEROBE CULT: NORMAL
BACTERIA SPEC AEROBE CULT: NORMAL

## 2020-02-06 NOTE — PLAN OF CARE
Problem: Patient Care Overview (Adult)  Goal: Plan of Care Review  Outcome: Ongoing (interventions implemented as appropriate)  Goal: Adult Individualization and Mutuality  Outcome: Ongoing (interventions implemented as appropriate)  Goal: Discharge Needs Assessment  Outcome: Ongoing (interventions implemented as appropriate)    Problem: Pressure Ulcer Risk (Mahesh Scale) (Adult,Obstetrics,Pediatric)  Goal: Identify Related Risk Factors and Signs and Symptoms  Outcome: Ongoing (interventions implemented as appropriate)  Goal: Skin Integrity  Outcome: Ongoing (interventions implemented as appropriate)    Problem: Gastrointestinal Bleeding (Adult)  Goal: Signs and Symptoms of Listed Potential Problems Will be Absent or Manageable (Gastrointestinal Bleeding)  Outcome: Ongoing (interventions implemented as appropriate)       room.    13. If you have a Living Will or Durable Power of , please bring a copy on the day of your procedure. 15. With your permission, one family member may accompany you while you are being prepared for surgery. Once you are ready, additional family members may join you. HOW WE KEEP YOU SAFE and WORK TO PREVENT SURGICAL SITE INFECTIONS:  1. Health care workers should always check your ID bracelet to verify your name and birth date. You will be asked many times to state your name, date of birth, and allergies. 2. Health care workers should always clean their hands with soap or alcohol gel before providing care to you. It is okay to ask anyone if they cleaned their hands before they touch you. 3. You will be actively involved in verifying the type of procedure you are having and ensuring the correct surgical site. This will be confirmed multiple times prior to your procedure. Do NOT catarina your surgery site UNLESS instructed to by your surgeon. 4. Do not shave or wax for 72 hours prior to procedure near your operative site. Shaving with a razor can irritate your skin and make it easier to develop an infection. On the day of your procedure, any hair that needs to be removed near the surgical site will be clipped by a healthcare worker using a special clippers designed to avoid skin irritation. 5. When you are in the operating room, your surgical site will be cleansed with a special soap, and in most cases, you will be given an antibiotic before the surgery begins. What to expect AFTER YOUR PROCEDURE:  1. Immediately following your procedure, your will be taken to the PACU for the first phase of your recovery. Your nurse will help you recover from any potential side effects of anesthesia, such as extreme drowsiness, changes in your vital signs or breathing patterns. Nausea, headache, muscle aches, or sore throat may also occur after anesthesia.   Your nurse will help you manage these potential side effects. 2. For comfort and safety, arrange to have someone at home with you for the first 24 hours after discharge. 3. You and your family will be given written instructions about your diet, activity, dressing care, medications, and return visits. 4. Once at home, should issues with nausea, pain, or bleeding occur, or should you notice any signs of infection, you should call your surgeon. 5. Always clean your hands before and after caring for your wound. Do not let your family touch your surgery site without cleaning their hands. 6. Narcotic pain medications can cause significant constipation. You may want to add a stool softener to your postoperative medication schedule or speak to your surgeon on how best to manage this SIDE EFFECT. SPECIAL INSTRUCTIONS     Thank you for allowing us to care for you. We strive to exceed your expectations in the delivery of care and service provided to you and your family. If you need to contact us for any reason, please call us at [phone]    Instructions reviewed with patient during preadmission testing phone interview. Toi Lazo. 2/6/2020 .2:33 PM      ADDITIONAL EDUCATIONAL INFORMATION REVIEWED PER PHONE WITH YOU AND/OR YOUR FAMILY:  Yes Bring a urine sample on day of surgery    Yes Antibacterial Soap

## (undated) DEVICE — MINI TREK CORONARY DILATATION CATHETER 2.0 MM X 30 MM / RAPID-EXCHANGE: Brand: MINI TREK

## (undated) DEVICE — MINI TREK CORONARY DILATATION CATHETER 1.20 MM X 20 MM / RAPID-EXCHANGE: Brand: MINI TREK

## (undated) DEVICE — "MH-443 SUCTION VALVE F/EVIS140 EVIS160": Brand: SUCTION VALVE

## (undated) DEVICE — "MH-948 A/W CHANNEL CLEANING ADPTR -VIDEO": Brand: AW CHANNEL CLEANING ADAPTE

## (undated) DEVICE — TBG 02 CRUSH RESIST LF CLR 7FT

## (undated) DEVICE — MODEL BT2000 P/N 700287-012KIT CONTENTS: MANIFOLD WITH SALINE AND CONTRAST PORTS, SALINE TUBING WITH SPIKE AND HAND SYRINGE, TRANSDUCER: Brand: BT2000 AUTOMATED MANIFOLD KIT

## (undated) DEVICE — KODAMA CATHETER, P/N 701470CONTENTS: IMAGING CATHETER, 3 ML SYRINGE, 10 ML SYRINGE, EXTENSION SET, STERILE BAG, IFU: Brand: ACIST KODAMA® CORONARY IMAGING CATHETER

## (undated) DEVICE — BALN EUPHORA 3X20MM

## (undated) DEVICE — GW INQWIRE FC PTFE STD J/1.5 .035 260

## (undated) DEVICE — PK CATH CARD 10

## (undated) DEVICE — GUIDE CATHETER: Brand: MACH1™

## (undated) DEVICE — THE BITE BLOCK MAXI, LATEX FREE STRAP IS USED TO PROTECT THE ENDOSCOPE INSERTION TUBE FROM BEING BITTEN BY THE PATIENT.

## (undated) DEVICE — DEV INFL MONARCH 25W

## (undated) DEVICE — MODEL AT P65, P/N 701554-001KIT CONTENTS: HAND CONTROLLER, 3-WAY HIGH-PRESSURE STOPCOCK WITH ROTATING END AND PREMIUM HIGH-PRESSURE TUBING: Brand: ANGIOTOUCH® KIT

## (undated) DEVICE — SYR LUERLOK 50ML

## (undated) DEVICE — MEDI-VAC NON-CONDUCTIVE SUCTION TUBING: Brand: CARDINAL HEALTH

## (undated) DEVICE — DEV COMP RAD PRELUDESYNC 24CM

## (undated) DEVICE — CONTN GRAD MEAS TRIANG 32OZ BLK

## (undated) DEVICE — BALN EUPHORA 3X30MM

## (undated) DEVICE — ADAPT ST INFUS ADMIN SYR 70IN

## (undated) DEVICE — HI-TORQUE VERSATURN F GUIDE WIRE FULLY COATED .014 STRAIGHT TIP 190 CM: Brand: HI-TORQUE VERSATURN

## (undated) DEVICE — SWAN-GANZ TRUE SIZE THERMODULTION CATHETER, 5F: Brand: SWAN-GANZ TRUE SIZE

## (undated) DEVICE — ENDOGATOR HYBRID TUBING KIT FOR USE WITH ENDOGATOR IRRIGATION PUMP, OLYMPUS PUMP, GI4000 ESU, AND TORRENT IRRIGATION PUMP.: Brand: ENDOGATOR KIT

## (undated) DEVICE — DEV COMP RAD PRELUDESYNC 29CM

## (undated) DEVICE — GUIDELINER CATHETERS ARE INTENDED TO BE USED IN CONJUNCTION WITH GUIDE CATHETERS TO ACCESS DISCRETE REGIONS OF THE CORONARY AND/OR PERIPHERAL VASCULATURE, AND TO FACILITATE PLACEMENT OF INTERVENTIONAL DEVICES.: Brand: GUIDELINER® V3 CATHETER

## (undated) DEVICE — CATH DIAG EXPO .045 FL3  5F 100CM

## (undated) DEVICE — BIPOLAR ELECTROHEMOSTASIS CATHETER: Brand: INJECTION GOLD PROBE

## (undated) DEVICE — Device: Brand: ENDOGATOR

## (undated) DEVICE — INTRO SHEATH PRELUDE IDEAL SPRNG COIL 021 6F 23X80CM

## (undated) DEVICE — RUNTHROUGH NS EXTRA FLOPPY PTCA GUIDEWIRE: Brand: RUNTHROUGH

## (undated) DEVICE — INTRO ACCSR BLNT TP

## (undated) DEVICE — CATH DIAG EXPO M/ PK 5F FL4/FR4 PIG

## (undated) DEVICE — MEDI-VAC YANKAUER SUCTION HANDLE W/BULBOUS TIP: Brand: CARDINAL HEALTH

## (undated) DEVICE — NC TREK CORONARY DILATATION CATHETER 3.0 MM X 25 MM / RAPID-EXCHANGE: Brand: NC TREK

## (undated) DEVICE — CANN NASL CO2 DIVIDED A/

## (undated) DEVICE — RADIFOCUS GLIDEWIRE: Brand: GLIDEWIRE

## (undated) DEVICE — SENSR O2 OXIMAX FNGR A/ 18IN NONSTR

## (undated) DEVICE — "MH-438 A/W VLVE F/140 EVIS-140": Brand: AIR/WATER VALVE

## (undated) DEVICE — MINI TREK CORONARY DILATATION CATHETER 1.50 MM X 20 MM / RAPID-EXCHANGE: Brand: MINI TREK

## (undated) DEVICE — BALN NC/EUPHORA RX 3.50X20MM